# Patient Record
Sex: FEMALE | Race: WHITE | NOT HISPANIC OR LATINO | Employment: OTHER | URBAN - METROPOLITAN AREA
[De-identification: names, ages, dates, MRNs, and addresses within clinical notes are randomized per-mention and may not be internally consistent; named-entity substitution may affect disease eponyms.]

---

## 2017-03-21 PROBLEM — Z01.818 PRE-OP TESTING: Status: ACTIVE | Noted: 2017-03-21

## 2017-04-03 ENCOUNTER — OFFICE VISIT (OUTPATIENT)
Dept: OTOLARYNGOLOGY | Facility: CLINIC | Age: 67
End: 2017-04-03
Payer: MEDICARE

## 2017-04-03 ENCOUNTER — CLINICAL SUPPORT (OUTPATIENT)
Dept: AUDIOLOGY | Facility: CLINIC | Age: 67
End: 2017-04-03
Payer: MEDICARE

## 2017-04-03 VITALS — HEIGHT: 64 IN | WEIGHT: 185.88 LBS | BODY MASS INDEX: 31.73 KG/M2

## 2017-04-03 DIAGNOSIS — H80.92 OTOSCLEROSIS, LEFT: Primary | ICD-10-CM

## 2017-04-03 PROCEDURE — 92557 COMPREHENSIVE HEARING TEST: CPT | Mod: S$GLB,,, | Performed by: AUDIOLOGIST

## 2017-04-03 PROCEDURE — 99203 OFFICE O/P NEW LOW 30 MIN: CPT | Mod: S$GLB,,, | Performed by: OTOLARYNGOLOGY

## 2017-04-03 PROCEDURE — 99999 PR PBB SHADOW E&M-EST. PATIENT-LVL III: CPT | Mod: PBBFAC,,, | Performed by: OTOLARYNGOLOGY

## 2017-04-03 PROCEDURE — 92550 TYMPANOMETRY & REFLEX THRESH: CPT | Mod: S$GLB,,, | Performed by: AUDIOLOGIST

## 2017-04-03 PROCEDURE — 1160F RVW MEDS BY RX/DR IN RCRD: CPT | Mod: S$GLB,,, | Performed by: OTOLARYNGOLOGY
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
## 2017-04-04 ENCOUNTER — TELEPHONE (OUTPATIENT)
Dept: OTOLARYNGOLOGY | Facility: CLINIC | Age: 67
End: 2017-04-04

## 2017-04-04 DIAGNOSIS — H80.92 OTOSCLEROSIS OF LEFT EAR: Primary | ICD-10-CM

## 2017-04-19 PROBLEM — R09.82 POST-NASAL DRAINAGE: Status: ACTIVE | Noted: 2017-04-19

## 2017-05-01 PROBLEM — M70.62 GREATER TROCHANTERIC BURSITIS OF LEFT HIP: Status: ACTIVE | Noted: 2017-05-01

## 2017-05-01 PROBLEM — M65.311 TRIGGER FINGER OF RIGHT THUMB: Status: ACTIVE | Noted: 2017-05-01

## 2017-05-16 ENCOUNTER — ANESTHESIA (OUTPATIENT)
Dept: SURGERY | Facility: HOSPITAL | Age: 67
End: 2017-05-16
Payer: MEDICARE

## 2017-05-16 ENCOUNTER — HOSPITAL ENCOUNTER (OUTPATIENT)
Facility: HOSPITAL | Age: 67
Discharge: HOME | End: 2017-05-16
Attending: OTOLARYNGOLOGY | Admitting: OTOLARYNGOLOGY
Payer: MEDICARE

## 2017-05-16 ENCOUNTER — ANESTHESIA EVENT (OUTPATIENT)
Dept: SURGERY | Facility: HOSPITAL | Age: 67
End: 2017-05-16
Payer: MEDICARE

## 2017-05-16 ENCOUNTER — SURGERY (OUTPATIENT)
Age: 67
End: 2017-05-16

## 2017-05-16 DIAGNOSIS — H80.90 OTOSCLEROSIS: ICD-10-CM

## 2017-05-16 DIAGNOSIS — H80.92 OTOSCLEROSIS, LEFT: Primary | ICD-10-CM

## 2017-05-16 PROCEDURE — 36000708 HC OR TIME LEV III 1ST 15 MIN: Performed by: OTOLARYNGOLOGY

## 2017-05-16 PROCEDURE — 63600175 PHARM REV CODE 636 W HCPCS: Performed by: STUDENT IN AN ORGANIZED HEALTH CARE EDUCATION/TRAINING PROGRAM

## 2017-05-16 PROCEDURE — 27201423 OPTIME MED/SURG SUP & DEVICES STERILE SUPPLY: Performed by: OTOLARYNGOLOGY

## 2017-05-16 PROCEDURE — 37000008 HC ANESTHESIA 1ST 15 MINUTES: Performed by: OTOLARYNGOLOGY

## 2017-05-16 PROCEDURE — 71000016 HC POSTOP RECOV ADDL HR: Performed by: OTOLARYNGOLOGY

## 2017-05-16 PROCEDURE — 36000709 HC OR TIME LEV III EA ADD 15 MIN: Performed by: OTOLARYNGOLOGY

## 2017-05-16 PROCEDURE — 27000221 HC OXYGEN, UP TO 24 HOURS

## 2017-05-16 PROCEDURE — D9220A PRA ANESTHESIA: Mod: ANES,,, | Performed by: ANESTHESIOLOGY

## 2017-05-16 PROCEDURE — 25000003 PHARM REV CODE 250: Performed by: OTOLARYNGOLOGY

## 2017-05-16 PROCEDURE — 71000033 HC RECOVERY, INTIAL HOUR: Performed by: OTOLARYNGOLOGY

## 2017-05-16 PROCEDURE — 63600175 PHARM REV CODE 636 W HCPCS: Performed by: NURSE ANESTHETIST, CERTIFIED REGISTERED

## 2017-05-16 PROCEDURE — 69660 REVISE MIDDLE EAR BONE: CPT | Mod: LT,,, | Performed by: OTOLARYNGOLOGY

## 2017-05-16 PROCEDURE — 25000003 PHARM REV CODE 250: Performed by: NURSE ANESTHETIST, CERTIFIED REGISTERED

## 2017-05-16 PROCEDURE — 63600175 PHARM REV CODE 636 W HCPCS

## 2017-05-16 PROCEDURE — 27100025 HC TUBING, SET FLUID WARMER: Performed by: NURSE ANESTHETIST, CERTIFIED REGISTERED

## 2017-05-16 PROCEDURE — 71000039 HC RECOVERY, EACH ADD'L HOUR: Performed by: OTOLARYNGOLOGY

## 2017-05-16 PROCEDURE — L8613 OSSICULAR IMPLANT: HCPCS | Performed by: OTOLARYNGOLOGY

## 2017-05-16 PROCEDURE — 94760 N-INVAS EAR/PLS OXIMETRY 1: CPT

## 2017-05-16 PROCEDURE — 71000015 HC POSTOP RECOV 1ST HR: Performed by: OTOLARYNGOLOGY

## 2017-05-16 PROCEDURE — 25000003 PHARM REV CODE 250: Performed by: STUDENT IN AN ORGANIZED HEALTH CARE EDUCATION/TRAINING PROGRAM

## 2017-05-16 PROCEDURE — D9220A PRA ANESTHESIA: Mod: CRNA,,, | Performed by: NURSE ANESTHETIST, CERTIFIED REGISTERED

## 2017-05-16 PROCEDURE — 37000009 HC ANESTHESIA EA ADD 15 MINS: Performed by: OTOLARYNGOLOGY

## 2017-05-16 DEVICE — PISTON WIRE 4.25MMX.6MM S S: Type: IMPLANTABLE DEVICE | Site: EAR | Status: FUNCTIONAL

## 2017-05-16 RX ORDER — NEOMYCIN SULFATE, POLYMYXIN B SULFATE AND HYDROCORTISONE 10; 3.5; 1 MG/ML; MG/ML; [USP'U]/ML
SUSPENSION/ DROPS AURICULAR (OTIC)
Status: DISCONTINUED | OUTPATIENT
Start: 2017-05-16 | End: 2017-05-16 | Stop reason: HOSPADM

## 2017-05-16 RX ORDER — SODIUM CHLORIDE 9 MG/ML
INJECTION, SOLUTION INTRAVENOUS CONTINUOUS
Status: DISCONTINUED | OUTPATIENT
Start: 2017-05-16 | End: 2017-05-16 | Stop reason: HOSPADM

## 2017-05-16 RX ORDER — MECLIZINE HYDROCHLORIDE 25 MG/1
25 TABLET ORAL 3 TIMES DAILY PRN
Qty: 30 TABLET | Refills: 1 | Status: SHIPPED | OUTPATIENT
Start: 2017-05-16 | End: 2017-06-07

## 2017-05-16 RX ORDER — PHENYLEPHRINE HYDROCHLORIDE 10 MG/ML
INJECTION INTRAVENOUS
Status: DISCONTINUED | OUTPATIENT
Start: 2017-05-16 | End: 2017-05-16

## 2017-05-16 RX ORDER — FENTANYL CITRATE 50 UG/ML
INJECTION, SOLUTION INTRAMUSCULAR; INTRAVENOUS
Status: DISCONTINUED | OUTPATIENT
Start: 2017-05-16 | End: 2017-05-16

## 2017-05-16 RX ORDER — MECLIZINE HYDROCHLORIDE 25 MG/1
25 TABLET ORAL 3 TIMES DAILY PRN
Status: DISCONTINUED | OUTPATIENT
Start: 2017-05-16 | End: 2017-05-16 | Stop reason: HOSPADM

## 2017-05-16 RX ORDER — LIDOCAINE HCL/PF 100 MG/5ML
SYRINGE (ML) INTRAVENOUS
Status: DISCONTINUED | OUTPATIENT
Start: 2017-05-16 | End: 2017-05-16

## 2017-05-16 RX ORDER — SUCCINYLCHOLINE CHLORIDE 20 MG/ML
INJECTION INTRAMUSCULAR; INTRAVENOUS
Status: DISCONTINUED | OUTPATIENT
Start: 2017-05-16 | End: 2017-05-16

## 2017-05-16 RX ORDER — OXYCODONE AND ACETAMINOPHEN 5; 325 MG/1; MG/1
1 TABLET ORAL EVERY 6 HOURS PRN
Qty: 30 TABLET | Refills: 0 | Status: SHIPPED | OUTPATIENT
Start: 2017-05-16 | End: 2017-05-26

## 2017-05-16 RX ORDER — DEXAMETHASONE SODIUM PHOSPHATE 4 MG/ML
INJECTION, SOLUTION INTRA-ARTICULAR; INTRALESIONAL; INTRAMUSCULAR; INTRAVENOUS; SOFT TISSUE
Status: DISCONTINUED | OUTPATIENT
Start: 2017-05-16 | End: 2017-05-16

## 2017-05-16 RX ORDER — ACETAMINOPHEN 10 MG/ML
INJECTION, SOLUTION INTRAVENOUS
Status: DISCONTINUED | OUTPATIENT
Start: 2017-05-16 | End: 2017-05-16

## 2017-05-16 RX ORDER — DIAZEPAM 10 MG/2ML
INJECTION INTRAMUSCULAR
Status: COMPLETED
Start: 2017-05-16 | End: 2017-05-16

## 2017-05-16 RX ORDER — CEFAZOLIN SODIUM 1 G/3ML
INJECTION, POWDER, FOR SOLUTION INTRAMUSCULAR; INTRAVENOUS
Status: DISCONTINUED | OUTPATIENT
Start: 2017-05-16 | End: 2017-05-16

## 2017-05-16 RX ORDER — LIDOCAINE HYDROCHLORIDE 10 MG/ML
1 INJECTION, SOLUTION EPIDURAL; INFILTRATION; INTRACAUDAL; PERINEURAL ONCE
Status: COMPLETED | OUTPATIENT
Start: 2017-05-16 | End: 2017-05-16

## 2017-05-16 RX ORDER — ONDANSETRON 2 MG/ML
INJECTION INTRAMUSCULAR; INTRAVENOUS
Status: DISCONTINUED | OUTPATIENT
Start: 2017-05-16 | End: 2017-05-16

## 2017-05-16 RX ORDER — DIAZEPAM 2 MG/1
2 TABLET ORAL EVERY 6 HOURS PRN
Qty: 20 TABLET | Refills: 1 | Status: SHIPPED | OUTPATIENT
Start: 2017-05-16 | End: 2017-07-03

## 2017-05-16 RX ORDER — DEXMEDETOMIDINE HYDROCHLORIDE 100 UG/ML
INJECTION, SOLUTION INTRAVENOUS
Status: DISCONTINUED | OUTPATIENT
Start: 2017-05-16 | End: 2017-05-16

## 2017-05-16 RX ORDER — OXYCODONE AND ACETAMINOPHEN 5; 325 MG/1; MG/1
1 TABLET ORAL EVERY 4 HOURS PRN
Status: DISCONTINUED | OUTPATIENT
Start: 2017-05-16 | End: 2017-05-16 | Stop reason: HOSPADM

## 2017-05-16 RX ORDER — GLYCOPYRROLATE 0.2 MG/ML
INJECTION INTRAMUSCULAR; INTRAVENOUS
Status: DISCONTINUED | OUTPATIENT
Start: 2017-05-16 | End: 2017-05-16

## 2017-05-16 RX ORDER — MIDAZOLAM HYDROCHLORIDE 1 MG/ML
INJECTION, SOLUTION INTRAMUSCULAR; INTRAVENOUS
Status: DISCONTINUED | OUTPATIENT
Start: 2017-05-16 | End: 2017-05-16

## 2017-05-16 RX ORDER — PROPOFOL 10 MG/ML
VIAL (ML) INTRAVENOUS
Status: DISCONTINUED | OUTPATIENT
Start: 2017-05-16 | End: 2017-05-16

## 2017-05-16 RX ORDER — ROCURONIUM BROMIDE 10 MG/ML
INJECTION, SOLUTION INTRAVENOUS
Status: DISCONTINUED | OUTPATIENT
Start: 2017-05-16 | End: 2017-05-16

## 2017-05-16 RX ORDER — LIDOCAINE HYDROCHLORIDE AND EPINEPHRINE 10; 10 MG/ML; UG/ML
INJECTION, SOLUTION INFILTRATION; PERINEURAL
Status: DISCONTINUED | OUTPATIENT
Start: 2017-05-16 | End: 2017-05-16 | Stop reason: HOSPADM

## 2017-05-16 RX ORDER — LIDOCAINE HYDROCHLORIDE 10 MG/ML
1 INJECTION, SOLUTION EPIDURAL; INFILTRATION; INTRACAUDAL; PERINEURAL ONCE
Status: DISCONTINUED | OUTPATIENT
Start: 2017-05-16 | End: 2017-05-16 | Stop reason: HOSPADM

## 2017-05-16 RX ORDER — DOXYCYCLINE 100 MG/1
100 CAPSULE ORAL 2 TIMES DAILY
Qty: 20 CAPSULE | Refills: 0 | Status: SHIPPED | OUTPATIENT
Start: 2017-05-16 | End: 2017-05-26

## 2017-05-16 RX ORDER — DIAZEPAM 10 MG/2ML
2 INJECTION INTRAMUSCULAR ONCE
Status: COMPLETED | OUTPATIENT
Start: 2017-05-16 | End: 2017-05-16

## 2017-05-16 RX ORDER — ONDANSETRON 2 MG/ML
4 INJECTION INTRAMUSCULAR; INTRAVENOUS ONCE AS NEEDED
Status: COMPLETED | OUTPATIENT
Start: 2017-05-16 | End: 2017-05-16

## 2017-05-16 RX ORDER — FAMOTIDINE 10 MG/ML
INJECTION INTRAVENOUS
Status: DISCONTINUED | OUTPATIENT
Start: 2017-05-16 | End: 2017-05-16

## 2017-05-16 RX ADMIN — PHENYLEPHRINE HYDROCHLORIDE 100 MCG: 10 INJECTION INTRAVENOUS at 07:05

## 2017-05-16 RX ADMIN — FENTANYL CITRATE 100 MCG: 50 INJECTION, SOLUTION INTRAMUSCULAR; INTRAVENOUS at 07:05

## 2017-05-16 RX ADMIN — PHENYLEPHRINE HYDROCHLORIDE 150 MCG: 10 INJECTION INTRAVENOUS at 08:05

## 2017-05-16 RX ADMIN — LIDOCAINE HYDROCHLORIDE 50 MG: 20 INJECTION, SOLUTION INTRAVENOUS at 08:05

## 2017-05-16 RX ADMIN — OXYCODONE HYDROCHLORIDE AND ACETAMINOPHEN 1 TABLET: 5; 325 TABLET ORAL at 09:05

## 2017-05-16 RX ADMIN — LIDOCAINE HYDROCHLORIDE 25 MG: 20 INJECTION, SOLUTION INTRAVENOUS at 08:05

## 2017-05-16 RX ADMIN — GELATIN ABSORBABLE SPONGE 12-7 MM 1 EACH: 12-7 MISC at 08:05

## 2017-05-16 RX ADMIN — FAMOTIDINE 20 MG: 10 INJECTION, SOLUTION INTRAVENOUS at 08:05

## 2017-05-16 RX ADMIN — ONDANSETRON 4 MG: 2 INJECTION INTRAMUSCULAR; INTRAVENOUS at 08:05

## 2017-05-16 RX ADMIN — DEXAMETHASONE SODIUM PHOSPHATE 8 MG: 4 INJECTION, SOLUTION INTRAMUSCULAR; INTRAVENOUS at 08:05

## 2017-05-16 RX ADMIN — PROPOFOL 200 MG: 10 INJECTION, EMULSION INTRAVENOUS at 07:05

## 2017-05-16 RX ADMIN — SODIUM CHLORIDE, SODIUM GLUCONATE, SODIUM ACETATE, POTASSIUM CHLORIDE, MAGNESIUM CHLORIDE, SODIUM PHOSPHATE, DIBASIC, AND POTASSIUM PHOSPHATE: .53; .5; .37; .037; .03; .012; .00082 INJECTION, SOLUTION INTRAVENOUS at 08:05

## 2017-05-16 RX ADMIN — SODIUM CHLORIDE: 0.9 INJECTION, SOLUTION INTRAVENOUS at 07:05

## 2017-05-16 RX ADMIN — ACETAMINOPHEN 1000 MG: 10 INJECTION, SOLUTION INTRAVENOUS at 08:05

## 2017-05-16 RX ADMIN — PHENYLEPHRINE HYDROCHLORIDE 100 MCG: 10 INJECTION INTRAVENOUS at 08:05

## 2017-05-16 RX ADMIN — NEOMYCIN SULFATE, POLYMYXIN B SULFATE AND HYDROCORTISONE 10 DROP: 10; 3.5; 1 SUSPENSION/ DROPS AURICULAR (OTIC) at 08:05

## 2017-05-16 RX ADMIN — PHENYLEPHRINE HYDROCHLORIDE 200 MCG: 10 INJECTION INTRAVENOUS at 08:05

## 2017-05-16 RX ADMIN — ROCURONIUM BROMIDE 5 MG: 10 INJECTION, SOLUTION INTRAVENOUS at 07:05

## 2017-05-16 RX ADMIN — GLYCOPYRROLATE 0.1 MG: 0.2 INJECTION, SOLUTION INTRAMUSCULAR; INTRAVENOUS at 07:05

## 2017-05-16 RX ADMIN — DIAZEPAM 2 MG: 5 INJECTION, SOLUTION INTRAMUSCULAR; INTRAVENOUS at 01:05

## 2017-05-16 RX ADMIN — SUCCINYLCHOLINE CHLORIDE 140 MG: 20 INJECTION, SOLUTION INTRAMUSCULAR; INTRAVENOUS at 07:05

## 2017-05-16 RX ADMIN — MECLIZINE HYDROCHLORIDE 25 MG: 25 TABLET ORAL at 09:05

## 2017-05-16 RX ADMIN — DIAZEPAM 2 MG: 10 INJECTION INTRAMUSCULAR at 01:05

## 2017-05-16 RX ADMIN — LIDOCAINE HYDROCHLORIDE AND EPINEPHRINE 10 ML: 10; 10 INJECTION, SOLUTION INFILTRATION; PERINEURAL at 08:05

## 2017-05-16 RX ADMIN — LIDOCAINE HYDROCHLORIDE 0.2 MG: 10 INJECTION, SOLUTION EPIDURAL; INFILTRATION; INTRACAUDAL; PERINEURAL at 07:05

## 2017-05-16 RX ADMIN — MIDAZOLAM HYDROCHLORIDE 2 MG: 1 INJECTION, SOLUTION INTRAMUSCULAR; INTRAVENOUS at 07:05

## 2017-05-16 RX ADMIN — CEFAZOLIN 2 G: 1 INJECTION, POWDER, FOR SOLUTION INTRAVENOUS at 07:05

## 2017-05-16 RX ADMIN — ONDANSETRON 4 MG: 2 INJECTION INTRAMUSCULAR; INTRAVENOUS at 12:05

## 2017-05-16 RX ADMIN — GLYCOPYRROLATE 0.2 MG: 0.2 INJECTION, SOLUTION INTRAMUSCULAR; INTRAVENOUS at 08:05

## 2017-05-16 RX ADMIN — DEXMEDETOMIDINE HYDROCHLORIDE 18 MCG: 100 INJECTION, SOLUTION, CONCENTRATE INTRAVENOUS at 08:05

## 2017-05-16 RX ADMIN — LIDOCAINE HYDROCHLORIDE 75 MG: 20 INJECTION, SOLUTION INTRAVENOUS at 07:05

## 2017-05-16 NOTE — PLAN OF CARE
Patient up OOB ambulated to restroom; upon return from restroom patient c/o of nausea and vomited 200mls of liquid. Attempting to reach anesthesia for phenergan order.

## 2017-05-16 NOTE — PLAN OF CARE
Spoke with Dr Alamo; he recommends no medicatiojn for n/v since it is related to vertigo. Will reassess the patients s/s at [id number]

## 2017-05-16 NOTE — TRANSFER OF CARE
"Anesthesia Transfer of Care Note    Patient: Abbey Vargas    Procedure(s) Performed: Procedure(s) (LRB):  STAPEDECTOMY (Left)    Transfer of care protocol was followed      Last vitals:   Visit Vitals    /70 (BP Location: Left arm, Patient Position: Sitting, BP Method: Automatic)    Pulse 77    Temp 37.1 °C (98.7 °F) (Tympanic)    Resp 18    Ht 5' 4" (1.626 m)    Wt 83.9 kg (185 lb)    SpO2 97%    Breastfeeding No    BMI 31.76 kg/m2     "

## 2017-05-16 NOTE — ANESTHESIA POSTPROCEDURE EVALUATION
"Anesthesia Post Evaluation    Patient: Abbey Vargas    Procedure(s) Performed: Procedure(s) (LRB):  STAPEDECTOMY (Left)    Final Anesthesia Type: general  Patient location during evaluation: PACU  Patient participation: Yes- Able to Participate  Level of consciousness: awake and alert and oriented  Post-procedure vital signs: reviewed and stable  Pain management: adequate  Airway patency: patent  PONV status at discharge: No PONV  Anesthetic complications: no      Cardiovascular status: blood pressure returned to baseline and hemodynamically stable  Respiratory status: spontaneous ventilation, unassisted and room air  Hydration status: euvolemic  Follow-up not needed.        Visit Vitals    BP (!) 103/59    Pulse 73    Temp 36.6 °C (97.9 °F) (Oral)    Resp 18    Ht 5' 4" (1.626 m)    Wt 83.9 kg (185 lb)    SpO2 95%    Breastfeeding No    BMI 31.76 kg/m2       Pain/Lauryn Score: Pain Assessment Performed: Yes (5/16/2017  9:55 AM)  Presence of Pain: denies (5/16/2017  9:55 AM)  Pain Rating Prior to Med Admin: 0 (5/16/2017  9:32 AM)  Pain Rating Post Med Admin: 0 (5/16/2017  9:55 AM)  Lauryn Score: 10 (5/16/2017  9:55 AM)      "

## 2017-05-16 NOTE — OP NOTE
Pre Op Diagnosis: Otosclerosis/ Left    Post Op Diagnosis: Same    Operative Procedure: Small fenestra laser stapedotomy with use of operating microscope/ Left                                       Surgeon: Markus Castro M.D.    Assist:Pepe    5/16/17    Anesthesia: General Endotracheal    Operative Procedure in Detail:    The patient was brought to the operating room and placed in the supine position. After general endotracheal anesthesia was induced the ear was prepped and draped in the usual fashion for transcanal surgery. The operating microscope was brought onto the field and used for the remaining of the case. The ear canal was infiltrated with 1% lidocaine with 1/100,000 epinephrine. A tympanomeatal flap 8 mm long was incised and elevated to the annulus and the tympanic membrane was turned forward on the malleus. The middle ear was inspected. The patient had a mobile malleus and incus but a stapes fixed by otosclerosis. Curettage of the posterior, superior bony margin was done. The chorda tympani was mobilized and protected. The distance between the latera surface of the incus was measured and found to be 4.5 mm. The laser was brought onto the field. With a setting of 1.5 tripp the stapedius tendon and posterior lydia were vaporized. The rozina was picked away. The incudostapedial joint was sectioned and the anterior lydia down fractured and removed from the operative field. The laser was directed to the posterior center of the footplate and a .7 mm sharda was created. This was finished off with a .7 mm sizer. A 4.25 by .6 mm hope-stainless steel prosthesis was placed into the center of the fenestra and crimped securely onto the incus. Small pieces of blood soaked gelfoam were packed around the prosthesis in the oval window niche to affect a seal. The tympanomeatal flap was returned to its normal position a packed into placed with cortisporin soaked gelfoam. Sterile cotton was applied to the meatus and  the procedure was terminated. The patient tolerated the procedure well. [place] for hearing improvement is good.

## 2017-05-16 NOTE — BRIEF OP NOTE
[facility]  Brief Operative Note     SUMMARY     Surgery Date: 5/16/2017     Surgeon(s) and Role:     * Zander Cantu MD - Resident - Assisting     * Markus Castro MD - Primary        Pre-op Diagnosis:  Otosclerosis of left ear [H80.92]    Post-op Diagnosis:  Post-Op Diagnosis Codes:     * Otosclerosis of left ear [H80.92]    Procedure(s) (LRB):  STAPEDECTOMY (Left)    Anesthesia: General    Description of the findings of the procedure: left otosclerosis    Findings/Key Components: see op report    Estimated Blood Loss: 1.7 ml         Specimens:   Specimen     None          Discharge Note    SUMMARY     Admit Date: 5/16/2017    Discharge Date and Time:  05/16/2017 8:47 AM    Hospital Course (synopsis of major diagnoses, care, treatment, and services provided during the course of the hospital stay): Pt has outpatient surgery and was awakened in the recovery room and discharged home in a stable condition. pts pain was controlled and tolerating a diet         Final Diagnosis: Post-Op Diagnosis Codes:     * Otosclerosis of left ear [H80.92]    Disposition: Home or Self Care    Follow Up/Patient Instructions:     Medications:  Reconciled Home Medications:   Current Discharge Medication List      START taking these medications    Details   doxycycline (VIBRAMYCIN) 100 MG Cap Take 1 capsule (100 mg total) by mouth 2 (two) times daily.  Qty: 20 capsule, Refills: 0      meclizine (ANTIVERT) 25 mg tablet Take 1 tablet (25 mg total) by mouth 3 (three) times daily as needed.  Qty: 30 tablet, Refills: 1      oxycodone-acetaminophen (PERCOCET) 5-325 mg per tablet Take 1 tablet by mouth every 6 (six) hours as needed for Pain.  Qty: 30 tablet, Refills: 0         CONTINUE these medications which have NOT CHANGED    Details   amitriptyline (ELAVIL) 50 MG tablet Take 1 tablet (50 mg total) by mouth every evening.  Qty: 90 tablet, Refills: 3    Associated Diagnoses: Carpal tunnel syndrome, unspecified  laterality; Bilateral low back pain without sciatica, unspecified chronicity      aspirin (ECOTRIN) 81 MG EC tablet Take 81 mg by mouth once daily.      BIOTIN ORAL Take by mouth.      calcium-vitamin D3 (CALCIUM 500 + D) 500 mg(1,250mg) -200 unit per tablet Take 1 tablet by mouth 2 (two) times daily with meals.      cyclobenzaprine (FLEXERIL) 5 MG tablet Take 5 mg by mouth 3 (three) times daily as needed.  Refills: 1      fish oil-omega-3 fatty acids 300-1,000 mg capsule Take 2 g by mouth once daily.      levothyroxine (SYNTHROID) 100 MCG tablet TAKE 1 TABLET BY MOUTH EVERY DAY  Qty: 90 tablet, Refills: 3      lisinopril 10 MG tablet TAKE 1 TABLET EVERY DAY  Qty: 90 tablet, Refills: 2    Associated Diagnoses: Essential hypertension      meloxicam (MOBIC) 7.5 MG tablet Take 1 tablet (7.5 mg total) by mouth once daily.  Qty: 90 tablet, Refills: 1    Comments: PATIENT REQUESTS A 90 DAY RX      multivitamin (THERAGRAN) per tablet Take 1 tablet by mouth once daily.      pravastatin (PRAVACHOL) 80 MG tablet Take 1 tablet (80 mg total) by mouth once daily.  Qty: 90 tablet, Refills: 3    Associated Diagnoses: Hyperlipidemia, unspecified hyperlipidemia type      ketorolac 0.5% (ACULAR) 0.5 % Drop Place 1 drop into the left eye 4 (four) times daily.      loratadine (CLARITIN) 10 mg tablet Take 1 tablet (10 mg total) by mouth once daily.  Qty: 7 tablet, Refills: 0    Associated Diagnoses: Sputum production      prednisoLONE acetate (PRED FORTE) 1 % DrpS Place 1 drop into the left eye once daily.              Discharge Procedure Orders  Diet general     Other restrictions (specify):   Order Comments: Keep ear clean and dry   Dry ear precautions    DO NOT USE CPAP FOR 5 DAYS      RTC 3 weekd  Do not strain, cough and sneeze with mouth open, do not bend over or lift over 10 pounds    Call md with any ear drainage or swelling, severe pain, facial weakness or other concerning symptoms     Call MD for:  increased confusion or  weakness     Call MD for:  persistent dizziness, light-headedness, or visual disturbances     Call MD for:  worsening rash     Call MD for:  severe persistent headache     Call MD for:  difficulty breathing or increased cough     Call MD for:  redness, tenderness, or signs of infection (pain, swelling, redness, odor or green/yellow discharge around incision site)     Call MD for:  severe uncontrolled pain     Call MD for:  persistent nausea and vomiting or diarrhea     Call MD for:  temperature >100.4     Remove dressing in 24 hours       Follow-up Information     Follow up with Markus Castro MD In 3 weeks.    Specialty:  Otolaryngology    Why:  For wound re-check    Contact information:    [address]  [facility] [address]  [phone]

## 2017-05-16 NOTE — ANESTHESIA RELEASE NOTE
"Anesthesia Release from PACU Note    Patient: Abbey Vargas    Procedure(s) Performed: Procedure(s) (LRB):  STAPEDECTOMY (Left)    Anesthesia type: general    Post pain: Adequate analgesia    Post assessment: no apparent anesthetic complications, tolerated procedure well and no evidence of recall    Last Vitals:   Visit Vitals    BP (!) 103/59    Pulse 73    Temp 36.7 °C (98 °F) (Oral)    Resp 18    Ht 5' 4" (1.626 m)    Wt 83.9 kg (185 lb)    SpO2 95%    Breastfeeding No    BMI 31.76 kg/m2       Post vital signs: stable    Level of consciousness: awake, alert  and oriented    Nausea/Vomiting: no nausea/no vomiting    Complications: none    Airway Patency: patent    Respiratory: unassisted, spontaneous ventilation, room air    Cardiovascular: stable and blood pressure at baseline    Hydration: euvolemic  "

## 2017-05-16 NOTE — ANESTHESIA PREPROCEDURE EVALUATION
05/16/2017  Abbey Vargas is a 66 y.o., female.    Anesthesia Evaluation    I have reviewed the Patient Summary Reports.    I have reviewed the Nursing Notes.   I have reviewed the Medications.     Review of Systems  Anesthesia Hx:  No problems with previous Anesthesia  History of prior surgery of interest to airway management or planning: Previous anesthesia: General Denies Family Hx of Anesthesia complications.   Denies Personal Hx of Anesthesia complications.   Social:  Non-Smoker, Social Alcohol Use    Cardiovascular:   Exercise tolerance: good Hypertension, well controlled hyperlipidemia NYHA Classification III ECG has been reviewed. Normal sinus rhythm  Within normal limits  No previous ECGs available  Confirmed by Wilver Costa MD ([id number]) on 3/7/2017 8:13:44 AM    Referred By: ADITI CARTER           Confirmed By:Wilver Costa MD   Pulmonary:   Sleep Apnea, CPAP    Musculoskeletal:   Arthritis   Spine Disorders: lumbar Disc disease and Degenerative disease    Endocrine:   Hypothyroidism        Physical Exam  General:  Well nourished, Obesity    Airway/Jaw/Neck:  Airway Findings: Mouth Opening: Normal Tongue: Normal  General Airway Assessment: Adult  Mallampati: III  TM Distance: Normal, at least 6 cm         Dental:  Dental Findings:         Mental Status:  Mental Status Findings:  Cooperative         Anesthesia Plan  Type of Anesthesia, risks & benefits discussed:  Anesthesia Type:  general  Patient's Preference:   Intra-op Monitoring Plan: standard ASA monitors  Intra-op Monitoring Plan Comments:   Post Op Pain Control Plan:   Post Op Pain Control Plan Comments:   Induction:   IV  Beta Blocker:  Patient is not currently on a Beta-Blocker (No further documentation required).       Informed Consent: Patient understands risks and agrees with Anesthesia plan.  Questions answered. Anesthesia  consent signed with patient.  ASA Score: 3     Day of Surgery Review of History & Physical: I have interviewed and examined the patient. I have reviewed the patient's H&P dated:  There are no significant changes.      Anesthesia Plan Notes: Labs,EKG AND CHEST X RAY REVIEWED.        Ready For Surgery From Anesthesia Perspective.

## 2017-05-16 NOTE — PLAN OF CARE
Dr Petersen at the desk. Notified him of patients nausea and vomiting with any type of movement.  Verbalizes that he will order IV Diazepam  And will also write a prescription for po diazepam for the patient to go home with.

## 2017-05-16 NOTE — PLAN OF CARE
Assisted patient to sit up at beside to start dressing for discharge. Pt immediately complains of nausea and dizziness. Patient vomited 250ml liquid. Will give zofran as ordered.

## 2017-05-16 NOTE — IP AVS SNAPSHOT
[facility]  [address]  [facility] [address]  Phone: [phone]           Patient Discharge Instructions   Our goal is to set you up for success. This packet includes information on your condition, medications, and your home care.  It will help you care for yourself to prevent having to return to the hospital.     Please ask your nurse if you have any questions.      There are many details to remember when preparing to leave the hospital. Here is what you will need to do:    1. Take your medicine. If you are prescribed medications, review your Medication List on the following pages. You may have new medications to  at the pharmacy and others that you'll need to stop taking. Review the instructions for how and when to take your medications. Talk with your doctor or nurses if you are unsure of what to do.     2. Go to your follow-up appointments. Specific follow-up information is listed in the following pages. Your may be contacted by a nurse or clinical provider about future appointments. Be sure we have all of the phone numbers to reach you. Please contact your provider's office if you are unable to make an appointment.     3. Watch for warning signs. Your doctor or nurse will give you detailed warning signs to watch for and when to call for assistance. These instructions may also include educational information about your condition. If you experience any of warning signs to your health, call your doctor.           [facility]  Unless otherwise directed by your provider, please   contact [facility], our nurse care line   that is available for 24/7 assistance.     [phone] (toll-free)     Registered nurses in the [facility]   provide: appointment scheduling, clinical advisement, health education, and other advisory services.                  ** Verify the list of medication(s) below is accurate and up to date. Carry this with you in case of  emergency. If your medications have changed, please notify your healthcare provider.             Medication List      START taking these medications        Additional Info                      doxycycline 100 MG Cap   Commonly known as:  VIBRAMYCIN   Quantity:  20 capsule   Refills:  0   Dose:  100 mg    Instructions:  Take 1 capsule (100 mg total) by mouth 2 (two) times daily.     Begin Date    AM    Noon    PM    Bedtime       meclizine 25 mg tablet   Commonly known as:  ANTIVERT   Quantity:  30 tablet   Refills:  1   Dose:  25 mg    Last time this was given:  25 mg on 5/16/2017  9:32 AM   Instructions:  Take 1 tablet (25 mg total) by mouth 3 (three) times daily as needed.     Begin Date    AM    Noon    PM    Bedtime       oxycodone-acetaminophen 5-325 mg per tablet   Commonly known as:  PERCOCET   Quantity:  30 tablet   Refills:  0   Dose:  1 tablet    Last time this was given:  1 tablet on 5/16/2017  9:32 AM   Instructions:  Take 1 tablet by mouth every 6 (six) hours as needed for Pain.     Begin Date    AM    Noon    PM    Bedtime         CONTINUE taking these medications        Additional Info                      amitriptyline 50 MG tablet   Commonly known as:  ELAVIL   Quantity:  90 tablet   Refills:  3   Dose:  50 mg    Instructions:  Take 1 tablet (50 mg total) by mouth every evening.     Begin Date    AM    Noon    PM    Bedtime       aspirin 81 MG EC tablet   Commonly known as:  ECOTRIN   Refills:  0   Dose:  81 mg    Instructions:  Take 81 mg by mouth once daily.     Begin Date    AM    Noon    PM    Bedtime       BIOTIN ORAL   Refills:  0    Instructions:  Take by mouth.     Begin Date    AM    Noon    PM    Bedtime       CALCIUM 500 + D 500 mg(1,250mg) -200 unit per tablet   Refills:  0   Dose:  1 tablet   Generic drug:  calcium-vitamin D3    Instructions:  Take 1 tablet by mouth 2 (two) times daily with meals.     Begin Date    AM    Noon    PM    Bedtime       cyclobenzaprine 5 MG tablet   Commonly  known as:  FLEXERIL   Refills:  1   Dose:  5 mg    Instructions:  Take 5 mg by mouth 3 (three) times daily as needed.     Begin Date    AM    Noon    PM    Bedtime       fish oil-omega-3 fatty acids 300-1,000 mg capsule   Refills:  0   Dose:  2 g    Instructions:  Take 2 g by mouth once daily.     Begin Date    AM    Noon    PM    Bedtime       levothyroxine 100 MCG tablet   Commonly known as:  SYNTHROID   Quantity:  90 tablet   Refills:  3    Instructions:  TAKE 1 TABLET BY MOUTH EVERY DAY     Begin Date    AM    Noon    PM    Bedtime       lisinopril 10 MG tablet   Quantity:  90 tablet   Refills:  2    Instructions:  TAKE 1 TABLET EVERY DAY     Begin Date    AM    Noon    PM    Bedtime       loratadine 10 mg tablet   Commonly known as:  CLARITIN   Quantity:  7 tablet   Refills:  0   Dose:  10 mg    Instructions:  Take 1 tablet (10 mg total) by mouth once daily.     Begin Date    AM    Noon    PM    Bedtime       meloxicam 7.5 MG tablet   Commonly known as:  MOBIC   Quantity:  90 tablet   Refills:  1   Dose:  7.5 mg   Comments:  PATIENT REQUESTS A 90 DAY RX    Instructions:  Take 1 tablet (7.5 mg total) by mouth once daily.     Begin Date    AM    Noon    PM    Bedtime       multivitamin per tablet   Commonly known as:  THERAGRAN   Refills:  0   Dose:  1 tablet    Instructions:  Take 1 tablet by mouth once daily.     Begin Date    AM    Noon    PM    Bedtime       pravastatin 80 MG tablet   Commonly known as:  PRAVACHOL   Quantity:  90 tablet   Refills:  3   Dose:  80 mg    Instructions:  Take 1 tablet (80 mg total) by mouth once daily.     Begin Date    AM    Noon    PM    Bedtime         ASK your doctor about these medications        Additional Info                      ketorolac 0.5% 0.5 % Drop   Commonly known as:  ACULAR   Refills:  0   Dose:  1 drop    Instructions:  Place 1 drop into the left eye 4 (four) times daily.     Begin Date    AM    Noon    PM    Bedtime       prednisoLONE acetate 1 % Drps    Commonly known as:  PRED FORTE   Refills:  0   Dose:  1 drop    Instructions:  Place 1 drop into the left eye once daily.     Begin Date    AM    Noon    PM    Bedtime            Where to Get Your Medications      You can get these medications from any pharmacy     Bring a paper prescription for each of these medications     doxycycline 100 MG Cap    meclizine 25 mg tablet    oxycodone-acetaminophen 5-325 mg per tablet                  Please bring to all follow up appointments:    1. A copy of your discharge instructions.  2. All medicines you are currently taking in their original bottles.  3. Identification and insurance card.    Please arrive 15 minutes ahead of scheduled appointment time.    Please call 24 hours in advance if you must reschedule your appointment and/or time.        Your Scheduled Appointments     Jun 07, 2017  8:45 AM CDT   Post OP with Markus Castro MD   [facility] - Otorhinolaryngology ([facility] )    [address]  [place] [address]   [phone]            Jun 07, 2017  9:30 AM CDT   Audiogram with AUDIOGRAM, AUDIO   [facility] - Audiology ([facility] )    [address]  [place] [address]   [phone]            Jun 16, 2017  8:15 AM CDT   Established Patient Visit with BETTY KINGR RESIDENT   [facility] - Phys. Med & Rehab ([facility]    [address] [facility] [address]   [phone]            Jul 03, 2017  1:00 PM CDT   OCT with OPHTHALMOLOGY TESTING, MAITE King - Ophthalmology ([facility])    [address] [facility] [address]   [phone]            Jul 18, 2017 10:00 AM CDT   Molina Visual Beltran with OPHTHALMOLOGY TESTING, MAITE King - Ophthalmology ([facility])    [address] [facility] [address]   [phone]              Follow-up Information     Follow up with Markus Castro MD In 3 weeks.    Specialty:  Otolaryngology    Why:  For  wound re-check    Contact information:    [address]  [facility] [address]  [phone]          Discharge Instructions     Future Orders    Call MD for:  difficulty breathing or increased cough     Call MD for:  increased confusion or weakness     Call MD for:  persistent dizziness, light-headedness, or visual disturbances     Call MD for:  persistent nausea and vomiting or diarrhea     Call MD for:  redness, tenderness, or signs of infection (pain, swelling, redness, odor or green/yellow discharge around incision site)     Call MD for:  severe persistent headache     Call MD for:  severe uncontrolled pain     Call MD for:  temperature >100.4     Call MD for:  worsening rash     Diet general     Questions:    Total calories:      Fat restriction, if any:      Protein restriction, if any:      Na restriction, if any:      Fluid restriction:      Additional restrictions:      Other restrictions (specify):     Comments:    Keep ear clean and dry   Dry ear precautions    DO NOT USE CPAP FOR 5 DAYS      RTC 3 weekd  Do not strain, cough and sneeze with mouth open, do not bend over or lift over 10 pounds    Call md with any ear drainage or swelling, severe pain, facial weakness or other concerning symptoms        Discharge Instructions         Stapes Surgery  Stapes surgery can improve conductive hearing. This surgery is done to replace all or part of a damaged (fixated) stapes bone. You will be given general anesthesia or local anesthesia with sedation during surgery. The surgery takes about 1 to 2 hours.    A diseased stapes bone  The stapes bone may become affected by otosclerosis, an inherited middle ear disease. The disease creates spongy bone tissue. The tissue grows and hardens around the footplate (the part of the stapes that touches the inner ear). Hearing loss may result.    Removing bone  The first step of stapes surgery is removing the crura. This is the part of the stapes that touches the footplate.  Your surgeon reaches the crura by going through the ear canal. An incision is made around the eardrum. The eardrum is held to one side. Then the crura is removed.    Preparing bone  The second step is preparing the diseased footplate for bone replacement. This will let sound vibrations reach the inner ear again. Your surgeon may make a hole in the footplate with a laser or drill. This is called a stapedotomy. Or all of the footplate may be removed and replaced with tissue. This is called a stapedectomy.    Replacing bone  The third step is replacing the crura. A manmade part (called a prosthesis) is attached to the incus bone. The prosthesis transmits sound waves to the inner ear. There are many types of prostheses. They are most often made of metal, plastic, or your own tissue. But some may use more than one of these materials.  Surgical complications  As with any surgery, this surgery has risks. You may still have some hearing loss that remains after surgery. In rare cases, the surgery can make hearing loss worse. Talk with your healthcare provider about any complications that this surgery has.   Date Last Reviewed: 7/1/2016  © 8132-7757 [employer]. [address] [place] [address]. All rights reserved. This information is not intended as a substitute for professional medical care. Always follow your healthcare professional's instructions.    Keep ear clean and dry   Dry ear precautions    DO NOT USE CPAP FOR 5 DAYS      RTC 3 weekd  Do not strain, cough and sneeze with mouth open, do not bend over or lift over 10 pounds    Call md with any ear drainage or swelling, severe pain, facial weakness or other concerning symptoms        Admission Information     Date & Time Provider Department CSN    5/16/2017  6:03 AM Markus Castro MD [facility] [id number]      Care Providers     Provider Role Specialty Primary office phone    Markus Castro MD Attending Provider  "Otolaryngology [phone]    Markus Castro MD Surgeon  Otolaryngology [phone]      Your Vitals Were     BP Pulse Temp Resp Height Weight    106/70 (BP Location: Left arm, Patient Position: Lying, BP Method: Automatic) 65 98.7 °F (37.1 °C) (Tympanic) 16 5' 4" (1.626 m) 83.9 kg (185 lb)    SpO2 BMI             95% 31.76 kg/m2         Recent Lab Values        11/26/2014                           9:04 AM           A1C 5.6                       Allergies as of 5/16/2017     No Known Allergies      Advance Directives     An advance directive is a document which, in the event you are no longer able to make decisions for yourself, tells your healthcare team what kind of treatment you do or do not want to receive, or who you would like to make those decisions for you.  If you do not currently have an advance directive, [facility] encourages you to create one.  For more information call:  [phone]), [phone] ([phone]),  or log on to [email or web address].        Language Assistance Services     ATTENTION: Language assistance services are available, free of charge. Please call [phone].      ATENCIÓN: Si habla español, tiene a sheikh disposición servicios gratuitos de asistencia lingüística. Llame al [phone].     CHÚ Ý: N?u b?n nói Ti?ng Vi?t, có các d?ch v? h? tr? ngôn ng? mi?n phí dành cho b?n. G?i s? [phone].         [facility] complies with applicable Federal civil rights laws and does not discriminate on the basis of race, color, national origin, age, disability, or sex.        "

## 2017-05-16 NOTE — PLAN OF CARE
Patient reassessed. Pt reports that she feels better; denies nausea or dizziness. Dr. Alamo notified. Patient states she is ready for discharge to home. Iv removed tip intact.

## 2017-05-16 NOTE — TRANSFER OF CARE
"Anesthesia Transfer of Care Note    Patient: Abbey Vargas    Procedure(s) Performed: Procedure(s) (LRB):  STAPEDECTOMY (Left)    Patient location: PACU    Anesthesia Type: general    Transport from OR: Transported from OR on 6-10 L/min O2 by face mask with adequate spontaneous ventilation    Post pain: adequate analgesia    Post assessment: no apparent anesthetic complications    Post vital signs: stable    Level of consciousness: sedated and responds to stimulation    Nausea/Vomiting: no nausea/vomiting    Complications: none          Last vitals:   Visit Vitals    BP (!) 91/55    Pulse 75    Temp 36.7 °C (98 °F) (Oral)    Resp 14    Ht 5' 4" (1.626 m)    Wt 83.9 kg (185 lb)    SpO2 98%    Breastfeeding No    BMI 31.76 kg/m2     "
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
## 2017-05-16 NOTE — PLAN OF CARE
Patient sitting up in bed. Patient sitting with eyes closed. Patient reports that she thinks that the dizziness and nausea are gone. Will continue to monitor and attempt to sit up at bedside.

## 2017-05-17 VITALS
HEART RATE: 80 BPM | HEIGHT: 64 IN | RESPIRATION RATE: 18 BRPM | BODY MASS INDEX: 31.58 KG/M2 | DIASTOLIC BLOOD PRESSURE: 74 MMHG | OXYGEN SATURATION: 98 % | WEIGHT: 185 LBS | TEMPERATURE: 99 F | SYSTOLIC BLOOD PRESSURE: 129 MMHG

## 2017-06-07 ENCOUNTER — OFFICE VISIT (OUTPATIENT)
Dept: OTOLARYNGOLOGY | Facility: CLINIC | Age: 67
End: 2017-06-07
Payer: MEDICARE

## 2017-06-07 ENCOUNTER — CLINICAL SUPPORT (OUTPATIENT)
Dept: AUDIOLOGY | Facility: CLINIC | Age: 67
End: 2017-06-07
Payer: MEDICARE

## 2017-06-07 VITALS — HEIGHT: 64 IN | WEIGHT: 183 LBS | BODY MASS INDEX: 31.24 KG/M2

## 2017-06-07 DIAGNOSIS — Z98.890 S/P EAR SURGERY: Primary | ICD-10-CM

## 2017-06-07 PROCEDURE — 99999 PR PBB SHADOW E&M-EST. PATIENT-LVL I: CPT | Mod: PBBFAC,,,

## 2017-06-07 PROCEDURE — 99024 POSTOP FOLLOW-UP VISIT: CPT | Mod: S$GLB,,, | Performed by: OTOLARYNGOLOGY

## 2017-06-07 PROCEDURE — 92557 COMPREHENSIVE HEARING TEST: CPT | Mod: 52,S$GLB,, | Performed by: AUDIOLOGIST

## 2017-06-07 PROCEDURE — 99999 PR PBB SHADOW E&M-EST. PATIENT-LVL III: CPT | Mod: PBBFAC,,, | Performed by: OTOLARYNGOLOGY

## 2017-06-07 RX ORDER — ETODOLAC 400 MG/1
400 TABLET, FILM COATED ORAL DAILY
COMMUNITY
End: 2017-07-03

## 2017-06-07 NOTE — PROGRESS NOTES
6/7/2017    AUDIOLOGICAL EVALUATION:    Abbey Vargas was seen for an audiological evaluation on 6/7/2017 following left ear surgery to monitor hearing.    Pure tone threshold testing for the left ear revealed a mild to severe mixed hearing loss in the left ear.  A speech reception threshold was obtained at 35dBHL for the left ear with 96% speech discrimination score.    Recommend:  1.  Otologic evaluation.  2.  Follow up audio as needed.

## 2017-06-07 NOTE — PROGRESS NOTES
PO 3 wk left stapes    Packing removed. Flap healing well. T.M. Intact. No evidence of infection or other problems.          Great result    F/U as needed

## 2017-07-13 DIAGNOSIS — I77.0: Primary | ICD-10-CM

## 2017-07-24 ENCOUNTER — HOSPITAL ENCOUNTER (OUTPATIENT)
Dept: VASCULAR SURGERY | Facility: CLINIC | Age: 67
Discharge: HOME | End: 2017-07-24
Attending: SURGERY
Payer: MEDICARE

## 2017-07-24 ENCOUNTER — INITIAL CONSULT (OUTPATIENT)
Dept: VASCULAR SURGERY | Facility: CLINIC | Age: 67
End: 2017-07-24
Payer: MEDICARE

## 2017-07-24 VITALS
HEART RATE: 81 BPM | BODY MASS INDEX: 30.56 KG/M2 | TEMPERATURE: 99 F | WEIGHT: 179 LBS | SYSTOLIC BLOOD PRESSURE: 116 MMHG | DIASTOLIC BLOOD PRESSURE: 68 MMHG | HEIGHT: 64 IN

## 2017-07-24 DIAGNOSIS — M79.89 RIGHT LEG SWELLING: Primary | ICD-10-CM

## 2017-07-24 DIAGNOSIS — I77.0: ICD-10-CM

## 2017-07-24 DIAGNOSIS — I87.2 VENOUS INSUFFICIENCY: ICD-10-CM

## 2017-07-24 PROCEDURE — 99203 OFFICE O/P NEW LOW 30 MIN: CPT | Mod: S$GLB,,, | Performed by: SURGERY

## 2017-07-24 PROCEDURE — 99999 PR PBB SHADOW E&M-EST. PATIENT-LVL III: CPT | Mod: PBBFAC,,, | Performed by: SURGERY

## 2017-07-24 PROCEDURE — 93970 EXTREMITY STUDY: CPT | Mod: S$GLB,,, | Performed by: SURGERY

## 2017-07-24 NOTE — PROGRESS NOTES
Abbey Vargas  07/24/2017  Referred by: ABBEY Alvarado NP  HPI:  Patient is a 66 y.o. female with HTN, HLD who is here today for evaluation of right leg swelling with varicosities.  Pt. Reports that on 6/16/17, she had a right arthroscopy and meniscus repair?? At which time she developed a knot in her vein on the anterior right calf below the knee and had leg swelling.  The knot diminished, but the right leg swelling has persisted primarily in the ankles x 5 weeks.  She denies pain and denies having these issues prior to the knee arthroscopy.  She has never worn compression stockings.  She denies pain. She stated that she may have a right knee replacement in the future and is concerned her right leg may worsen following the procedure.    No MI/stroke  Tobacco use: Never smoker  History of DVT/PE: No  No personal or family history of clotting disorders    Past Medical History:   Diagnosis Date    Arthritis     Carpal tunnel syndrome of right wrist 2000    Care for it here @ [facility]    Hypertension     Hypoactive thyroid     Muscle pain     Ptosis     Sleep apnea 2000    Care here at [facility] on CPAP     Past Surgical History:   Procedure Laterality Date    CARPAL TUNNEL RELEASE Right     CATARACT EXTRACTION Left 03/21/2017    FRACTURE SURGERY Left     arm    GANGLION CYST EXCISION Right     wrist    HEMORRHOID SURGERY       Family History   Problem Relation Age of Onset    Arthritis Mother     Scleroderma Mother     Cancer Mother     Hypertension Mother     Glaucoma Mother     Cataracts Mother     Breast cancer Mother     Cancer Father     Breast cancer Maternal Grandmother      Social History     Social History    Marital status:      Spouse name: N/A    Number of children: N/A    Years of education: 12     Occupational History    Not on file.     Social History Main Topics    Smoking status: Never Smoker    Smokeless tobacco: Never Used    Alcohol use 0.0 oz/week      Comment: occasional     Drug use: No    Sexual activity: No     Other Topics Concern    Are You Pregnant Or Think You May Be? No    Breast-Feeding No     Social History Narrative    No narrative on file       Current Outpatient Prescriptions:     amitriptyline (ELAVIL) 50 MG tablet, Take 1 tablet (50 mg total) by mouth every evening., Disp: 90 tablet, Rfl: 3    aspirin (ECOTRIN) 81 MG EC tablet, Take 81 mg by mouth once daily., Disp: , Rfl:     BIOTIN ORAL, Take by mouth., Disp: , Rfl:     calcium-vitamin D3 (CALCIUM 500 + D) 500 mg(1,250mg) -200 unit per tablet, Take 1 tablet by mouth 2 (two) times daily with meals., Disp: , Rfl:     cyclobenzaprine (FLEXERIL) 5 MG tablet, Take 5 mg by mouth 3 (three) times daily as needed., Disp: , Rfl: 1    fish oil-omega-3 fatty acids 300-1,000 mg capsule, Take 2 g by mouth once daily., Disp: , Rfl:     levothyroxine (SYNTHROID) 100 MCG tablet, TAKE 1 TABLET BY MOUTH EVERY DAY, Disp: 90 tablet, Rfl: 3    lisinopril 10 MG tablet, TAKE ONE TABLET BY MOUTH EVERY EVENING, Disp: 90 tablet, Rfl: 0    meloxicam (MOBIC) 7.5 MG tablet, TAKE ONE TABLET BY MOUTH EVERY EVENING, Disp: 90 tablet, Rfl: 0    multivitamin (THERAGRAN) per tablet, Take 1 tablet by mouth once daily., Disp: , Rfl:     pravastatin (PRAVACHOL) 80 MG tablet, Take 1 tablet (80 mg total) by mouth once daily., Disp: 90 tablet, Rfl: 3    REVIEW OF SYSTEMS:  General: negative; ENT: negative; Allergy and Immunology: negative; Hematological and Lymphatic: Negative; Endocrine: negative; Respiratory: no cough, shortness of breath, or wheezing; Cardiovascular: no chest pain or dyspnea on exertion; Gastrointestinal: no abdominal pain/back, change in bowel habits, or bloody stools; Genito-Urinary: no dysuria, trouble voiding, or hematuria; Musculoskeletal: Leg discomfort secondary to chronic venous insufficiency; Neurological: no TIA or stroke symptoms; Psychiatric: no nervousness, anxiety or depression.    PHYSICAL EXAM:   Right Arm  BP - Sittin/75 (17 1310)  Left Arm BP - Sittin/68 (17 1310)  Pulse: 81  Temp: 98.5 °F (36.9 °C)    General appearance:  Alert, well-appearing, and in no distress.  Oriented to person, place, and time   Neurological: Normal speech, no focal findings noted; CN II - XII grossly intact           Musculoskeletal: Digits/nail without cyanosis/clubbing.  Normal muscle strength/tone.                 Neck: Supple, no significant adenopathy; thyroid is not enlarged                  Carotid bruits cannot be auscultated                Chest:  Clear to auscultation, no wheezes, rales or rhonchi, symmetric air entry     No use of accessory muscles             Cardiac: Normal rate and regular rhythm, S1 and S2 normal; PMI non-displaced          Abdomen: Soft, nontender, nondistended, no masses or organomegaly     No rebound tenderness noted; bowel sounds normal     No palpable Pulsatile aortic mass is .      Extremities:   2+ femoral pulses bilaterally     Bilateral pedal pulses palpable.     Edema/leg swelling:  Minimal ankle swelling right leg     Hyperpigmentation: None     Right 1 cm purple venous area of congestion     Mild telangiectasias noted to bilateral legs     LAB RESULTS:  Lab Results   Component Value Date    K 4.5 2017    K 4.3 2016    K 3.8 2016    CREATININE 0.9 2017    CREATININE 0.9 2016    CREATININE 0.8 2016     Lab Results   Component Value Date    WBC 6.60 2017    WBC 5.30 2016    WBC 6.00 2016    HCT 42.2 2017    HCT 39.1 2016    HCT 41.9 2016     2017     2016     2016     Lab Results   Component Value Date    HGBA1C 5.6 2014     IMAGING:  Venous U/S: 17  R GSV: 4.1 mm; significant reflux noted in the GSV including the SFJ  L GSV: none    R SSV: 3.2 mm; reflux including the SPJ  L SSV: 3.7 mm; reflux including the SPJ  No DVT    1. Right leg swelling          IMP/PLAN:  66 y.o. female with HTN and HLD with mild right leg swelling that is present ~ 6 weeks post right knee arthroscopy.  I don't believe she requires further workup or treatment beyond conservative measures.      1. Rx for Compression stockings (15-20 mmHg thigh high) given + elevation   2. F/u prn       Giovanni Hensley MD  Vascular & Endovascular Surgery

## 2017-07-24 NOTE — LETTER
July 24, 2017      Ama Alvarado, NP  [address]  [place] [address]           [facility] - Vascular Surgery  [address]  [facility] [address]  Phone: [phone]  Fax: [phone]          Patient: Abbey Vargas   MR Number: [id number]   YOB: 1950   Date of Visit: 7/24/2017       Dear Ama Alvarado:    Thank you for referring Abbey Vargas to me for evaluation. Attached you will find relevant portions of my assessment and plan of care.    If you have questions, please do not hesitate to call me. I look forward to following Abbey Vargas along with you.    Sincerely,    Giovanni Hensley MD    Enclosure  CC:  No Recipients    If you would like to receive this communication electronically, please contact [email or web address] or [phone] to request more information on [employer] Link access.    For providers and/or their staff who would like to refer a patient to [facility], please contact us through our one-stop-shop provider referral line, [facility] , at [phone].    If you feel you have received this communication in error or would no longer like to receive these types of communications, please e-mail [email or web address]
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
## 2017-10-01 PROBLEM — S22.22XA CLOSED FRACTURE OF BODY OF STERNUM: Status: ACTIVE | Noted: 2017-10-01

## 2017-10-01 PROBLEM — R07.9 CHEST PAIN: Status: ACTIVE | Noted: 2017-10-01

## 2017-10-02 PROBLEM — S82.101G: Status: ACTIVE | Noted: 2017-10-02

## 2017-10-02 PROBLEM — M79.604 RIGHT LEG PAIN: Status: ACTIVE | Noted: 2017-10-02

## 2017-10-09 ENCOUNTER — OFFICE VISIT (OUTPATIENT)
Dept: URGENT CARE | Facility: CLINIC | Age: 67
End: 2017-10-09
Payer: MEDICARE

## 2017-10-09 VITALS
HEIGHT: 64 IN | RESPIRATION RATE: 18 BRPM | TEMPERATURE: 98 F | SYSTOLIC BLOOD PRESSURE: 103 MMHG | BODY MASS INDEX: 29.88 KG/M2 | HEART RATE: 70 BPM | DIASTOLIC BLOOD PRESSURE: 73 MMHG | WEIGHT: 175 LBS | OXYGEN SATURATION: 97 %

## 2017-10-09 DIAGNOSIS — S60.132A CONTUSION OF LEFT MIDDLE FINGER WITH DAMAGE TO NAIL, INITIAL ENCOUNTER: ICD-10-CM

## 2017-10-09 DIAGNOSIS — S62.663A CLOSED NONDISPLACED FRACTURE OF DISTAL PHALANX OF LEFT MIDDLE FINGER, INITIAL ENCOUNTER: Primary | ICD-10-CM

## 2017-10-09 PROCEDURE — 29130 APPL FINGER SPLINT STATIC: CPT | Mod: LT,S$GLB,, | Performed by: FAMILY MEDICINE

## 2017-10-09 PROCEDURE — 99204 OFFICE O/P NEW MOD 45 MIN: CPT | Mod: 25,S$GLB,, | Performed by: FAMILY MEDICINE

## 2017-10-09 NOTE — PATIENT INSTRUCTIONS
Please drink plenty of fluids.  Please get plenty of rest.  Please return here or go to the Emergency Department for any concerns or worsening of condition.  If you were prescribed a narcotic medication, do not drive or operate heavy equipment or machinery while taking these medications.  If you were not prescribed an anti-inflammatory medication, and if you do not have any history of stomach/intestinal ulcers, or kidney disease, or are not taking a blood thinner such as Coumadin, Plavix, Pradaxa, Eloquis, or Xaralta for example, it is OK to take over the counter Ibuprofen or Advil or Motrin or Aleve as directed.  Do not take these medications on an empty stomach.  Rest, ice, compression and elevation to the affected joint or limb as needed.    If you were given a sling, wear it for comfort until follow up as arranged.  If you were given or placed in a splint, wear it until your follow up visit or recheck.  If you  smoke, please stop smoking.       Please follow up with your primary care doctor or specialist as needed.    Ama Alvarado, YONIS  [phone]     Keep Ortho appointment Thursday as arranged.  Finger or Toe Fractures (Broken Finger or Toe)  A hard blow can break a bone in your toe or finger. Broken bones are also known as fractures. Even minor fractures need medical care. Without treatment, they may heal crooked, remain stiff, or develop other problems.    When to go to the Emergency Room (ER)  You may not always know when you have a fractured toe or finger. Apply ice to the injury right away. Then, seek medical care if:  · Your finger or toe is swollen or very painful.  · You cannot move your finger or toe normally.  · Your injured toe or finger is pale or blue.  · You are bleeding.  · A bone protrudes through your skin.  What to expect in the ER  A healthcare provider will ask about your injury and examine your toe or finger. You may have X-rays. Treatment will depend on the type of fracture you  have.  Toe fracture  Your healthcare provider may straighten a broken toe. You'll be given local anesthesia so you won't feel any discomfort during this procedure. Your injured toe may then be splinted by being taped to a toe next to it, or placed on a pad that's taped to your foot. Your healthcare provider may also ask you to apply ice and keep your foot elevated.  Finger fracture  Your healthcare provider may straighten a broken finger. A broken finger is likely to be placed in a metal splint. This helps straighten and protect the finger while it heals. Your healthcare provider may give you exercises to do as your injury heals, to prevent stiffness in your finger.  Date Last Reviewed: 9/28/2015 © 2000-2017 [email or web address]. [address] [place] [address], [place] [address]. All rights reserved. This information is not intended as a substitute for professional medical care. Always follow your healthcare professional's instructions.

## 2017-10-09 NOTE — PROGRESS NOTES
"Subjective:       Patient ID: Abbey Vargas is a 67 y.o. female.    Vitals:  height is 5' 4" (1.626 m) and weight is 79.4 kg (175 lb). Her oral temperature is 97.6 °F (36.4 °C). Her blood pressure is 103/73 and her pulse is 70. Her respiration is 18 and oxygen saturation is 97%.     Chief Complaint: Hand Pain (left)    Hand Pain    The incident occurred 6 to 12 hours ago. The incident occurred at home. The injury mechanism was a direct blow. The pain is present in the left fingers. The quality of the pain is described as aching and burning. The pain radiates to the left hand. The pain is at a severity of 10/10. The pain is severe. The pain has been constant since the incident. Associated symptoms include chest pain. Pertinent negatives include no numbness. Nothing aggravates the symptoms. The treatment provided no relief.     Review of Systems   Constitution: Negative for weakness and malaise/fatigue.   HENT: Negative for nosebleeds.    Cardiovascular: Positive for chest pain. Negative for syncope.   Respiratory: Negative for shortness of breath.    Skin: Positive for color change.   Musculoskeletal: Positive for joint pain and joint swelling. Negative for back pain and neck pain.   Gastrointestinal: Negative for abdominal pain.   Genitourinary: Negative for hematuria.   Neurological: Negative for dizziness and numbness.       Objective:      Physical Exam   Constitutional: She is oriented to person, place, and time. She appears well-developed and well-nourished. She is cooperative.  Non-toxic appearance. She does not appear ill. No distress.   HENT:   Head: Normocephalic and atraumatic. Head is without abrasion, without contusion and without laceration.   Right Ear: Hearing, tympanic membrane, external ear and ear canal normal. No hemotympanum.   Left Ear: Hearing, tympanic membrane, external ear and ear canal normal. No hemotympanum.   Nose: Nose normal. No mucosal edema, rhinorrhea or nasal deformity. No " epistaxis. Right sinus exhibits no maxillary sinus tenderness and no frontal sinus tenderness. Left sinus exhibits no maxillary sinus tenderness and no frontal sinus tenderness.   Mouth/Throat: Uvula is midline, oropharynx is clear and moist and mucous membranes are normal. No trismus in the jaw. Normal dentition. No uvula swelling. No posterior oropharyngeal erythema.   Eyes: Conjunctivae, EOM and lids are normal. Pupils are equal, round, and reactive to light. Right eye exhibits no discharge. Left eye exhibits no discharge. No scleral icterus.   Sclera clear bilat   Neck: Trachea normal, normal range of motion, full passive range of motion without pain and phonation normal. Neck supple. No spinous process tenderness and no muscular tenderness present. No neck rigidity. No tracheal deviation present.   Cardiovascular: Normal rate, regular rhythm, normal heart sounds, intact distal pulses and normal pulses.    Pulmonary/Chest: Effort normal and breath sounds normal. No respiratory distress.   Abdominal: Soft. Normal appearance and bowel sounds are normal. She exhibits no distension, no pulsatile midline mass and no mass. There is no tenderness.   Musculoskeletal: She exhibits no edema or deformity.        Right hand: She exhibits decreased range of motion, tenderness and swelling.        Hands:  Neurological: She is alert and oriented to person, place, and time. She has normal strength. No cranial nerve deficit or sensory deficit. She exhibits normal muscle tone. She displays no seizure activity. Coordination normal. GCS eye subscore is 4. GCS verbal subscore is 5. GCS motor subscore is 6.   Skin: Skin is warm, dry and intact. Capillary refill takes less than 2 seconds. No abrasion, no bruising, no burn, no ecchymosis and no laceration noted. She is not diaphoretic. No pallor.   Psychiatric: She has a normal mood and affect. Her speech is normal and behavior is normal. Judgment and thought content normal. Cognition  and memory are normal.   Nursing note and vitals reviewed.    Type of Interpretation: ED Physician (Independently Interpreted).  Radiology Procedure Done: Left Hand.  Interpretation: Comminuted Fx tip left long finger.      Assessment:       1. Closed nondisplaced fracture of distal phalanx of left middle finger, initial encounter    2. Contusion of left middle finger with damage to nail, initial encounter        Plan:    Finger splint placed.    Closed nondisplaced fracture of distal phalanx of left middle finger, initial encounter    Contusion of left middle finger with damage to nail, initial encounter  -     X-Ray Hand 3 view Left; Future; Expected date: 10/09/2017      Please drink plenty of fluids.  Please get plenty of rest.  Please return here or go to the Emergency Department for any concerns or worsening of condition.  If you were prescribed a narcotic medication, do not drive or operate heavy equipment or machinery while taking these medications.  If you were not prescribed an anti-inflammatory medication, and if you do not have any history of stomach/intestinal ulcers, or kidney disease, or are not taking a blood thinner such as Coumadin, Plavix, Pradaxa, Eloquis, or Xaralta for example, it is OK to take over the counter Ibuprofen or Advil or Motrin or Aleve as directed.  Do not take these medications on an empty stomach.  Rest, ice, compression and elevation to the affected joint or limb as needed.    If you were given a sling, wear it for comfort until follow up as arranged.  If you were given or placed in a splint, wear it until your follow up visit or recheck.  If you  smoke, please stop smoking.       Please follow up with your primary care doctor or specialist as needed.    Ama Alvarado, YONIS  [phone]

## 2017-12-18 ENCOUNTER — TELEPHONE (OUTPATIENT)
Dept: ORTHOPEDICS | Facility: CLINIC | Age: 67
End: 2017-12-18

## 2017-12-18 NOTE — TELEPHONE ENCOUNTER
----- Message from Isabella Curtis sent at 12/18/2017 10:45 AM CST -----  Contact: self  Pt called requesting a call back from both providers staff in regards to trying to schedule a np appt with an Ortho provider for her right knee. Pt needs a second opinion first and possibly may need to have a knee replacement done on her knee. She is coming from [facility] and they recommended that she see a provider at the main campus in [place]. Pt could be reached at [phone]

## 2017-12-18 NOTE — TELEPHONE ENCOUNTER
----- Message from Isabella Curtis sent at 12/18/2017 10:45 AM CST -----  Contact: self  Pt called requesting a call back from both providers staff in regards to trying to schedule a np appt with an Ortho provider for her right knee. Pt needs a second opinion first and possibly may need to have a knee replacement done on her knee. She is coming from [facility] [place] and they recommended that she see a provider at the main campus in [place]. Pt could be reached at [phone]

## 2017-12-19 ENCOUNTER — TELEPHONE (OUTPATIENT)
Dept: ORTHOPEDICS | Facility: CLINIC | Age: 67
End: 2017-12-19

## 2017-12-19 NOTE — TELEPHONE ENCOUNTER
----- Message from Jens Mcclain MD sent at 12/18/2017  1:04 PM CST -----  Contact: self  yes  ----- Message -----  From: Lacy Beasley MA  Sent: 12/18/2017  11:36 AM  To: Jens Mcclain MD    Will you see this pt?   ----- Message -----  From: Isabella Curtis  Sent: 12/18/2017  10:45 AM  To: Johny GUERRERO Staff, #    Pt called requesting a call back from both providers staff in regards to trying to schedule a np appt with an Ortho provider for her right knee. Pt needs a second opinion first and possibly may need to have a knee replacement done on her knee. She is coming from [facility] and they recommended that she see a provider at the main campus in [place]. Pt could be reached at [phone]

## 2018-01-09 ENCOUNTER — HOSPITAL ENCOUNTER (OUTPATIENT)
Dept: RADIOLOGY | Facility: HOSPITAL | Age: 68
Discharge: HOME | End: 2018-01-09
Attending: ORTHOPAEDIC SURGERY
Payer: MEDICARE

## 2018-01-09 ENCOUNTER — OFFICE VISIT (OUTPATIENT)
Dept: ORTHOPEDICS | Facility: CLINIC | Age: 68
End: 2018-01-09
Payer: MEDICARE

## 2018-01-09 VITALS — HEIGHT: 64 IN | BODY MASS INDEX: 30.63 KG/M2 | WEIGHT: 179.38 LBS

## 2018-01-09 DIAGNOSIS — M17.9 OSTEOARTHRITIS OF KNEE, UNSPECIFIED (CODE): Primary | ICD-10-CM

## 2018-01-09 DIAGNOSIS — M17.9 OSTEOARTHRITIS OF KNEE, UNSPECIFIED (CODE): ICD-10-CM

## 2018-01-09 PROCEDURE — 73560 X-RAY EXAM OF KNEE 1 OR 2: CPT | Mod: 26,RT,, | Performed by: RADIOLOGY

## 2018-01-09 PROCEDURE — 99204 OFFICE O/P NEW MOD 45 MIN: CPT | Mod: S$GLB,,, | Performed by: ORTHOPAEDIC SURGERY

## 2018-01-09 PROCEDURE — 99999 PR PBB SHADOW E&M-EST. PATIENT-LVL II: CPT | Mod: PBBFAC,,, | Performed by: ORTHOPAEDIC SURGERY

## 2018-01-09 PROCEDURE — 73560 X-RAY EXAM OF KNEE 1 OR 2: CPT | Mod: TC,RT

## 2018-01-09 NOTE — PROGRESS NOTES
Subjective:      Patient ID: Abbey Vargas is a 67 y.o. female.    Chief Complaint: Pain of the Right Knee    HPI   Abbey Vargas is a 67 y.o. female who presents to clinic for eval of R knee pain. Pt reports progressively worsening knee pain since may 2017. She saw an outside orthopaedic surgeon and had an arthroscopic debridement of her right knee 6/9/2017 with no improvement in her pain.  She is now requiring a cane to ambulate and walker for long distances. Pain is worse medial aspect of knee. Worse with weight bearing activity. No relief with NSAIDs.         Past Medical History:   Diagnosis Date    Arthritis     Carpal tunnel syndrome of right wrist 2000    [place] [facility] [place]    Hypertension     Hypoactive thyroid     Muscle pain     Ptosis     Sleep apnea 2000    [place] here at [facility] [place]     Past Surgical History:   Procedure Laterality Date    CARPAL TUNNEL RELEASE Right     CATARACT EXTRACTION Left 03/21/2017    FRACTURE SURGERY Left     arm    GANGLION CYST EXCISION Right     wrist    HEMORRHOID SURGERY      KNEE ARTHROSCOPY Right 06/09/2017    KNEE SURGERY      STAPEDECTOMY Left 05/16/2017     Family History   Problem Relation Age of Onset    Arthritis Mother     Scleroderma Mother     Cancer Mother     Hypertension Mother     Glaucoma Mother     Cataracts Mother     Breast cancer Mother     Cancer Father     Breast cancer Maternal Grandmother      Social History     Social History    Marital status:      Spouse name: N/A    Number of children: N/A    Years of education: 12     Occupational History    Not on file.     Social History Main Topics    Smoking status: Never Smoker    Smokeless tobacco: Never Used    Alcohol use 0.0 oz/week      Comment: occasional    Drug use: No    Sexual activity: No     Other Topics Concern    Are You Pregnant Or Think You May Be? No    Breast-Feeding No     Social History Narrative    No narrative on file      Current Outpatient Prescriptions on File Prior to Visit   Medication Sig Dispense Refill    amitriptyline (ELAVIL) 50 MG tablet Take 1 tablet (50 mg total) by mouth every evening. 90 tablet 3    BIOTIN ORAL Take 1 capsule by mouth once daily.       calcium-vitamin D3 (CALCIUM 500 + D) 500 mg(1,250mg) -200 unit per tablet Take 1 tablet by mouth 2 (two) times daily with meals.      cyclobenzaprine (FLEXERIL) 5 MG tablet Take 5 mg by mouth 3 (three) times daily as needed.  1    diclofenac sodium (VOLTAREN) 1 % Gel Apply 2 g topically 3 (three) times daily. 2 g 3    doxepin (ZONALON) 5 % cream       fish oil-omega-3 fatty acids 300-1,000 mg capsule Take 2 g by mouth once daily.      levothyroxine (SYNTHROID) 100 MCG tablet TAKE 1 TABLET BY MOUTH EVERY DAY 90 tablet 3    lidocaine-prilocaine (EMLA) cream Apply topically as needed.      lisinopril 10 MG tablet Take 1 tablet (10 mg total) by mouth every evening. 90 tablet 3    meloxicam (MOBIC) 7.5 MG tablet Take 1 tablet (7.5 mg total) by mouth every evening. 90 tablet 3    multivitamin (THERAGRAN) per tablet Take 1 tablet by mouth once daily.      pravastatin (PRAVACHOL) 80 MG tablet Take 1 tablet (80 mg total) by mouth once daily. 90 tablet 3    aspirin (ECOTRIN) 81 MG EC tablet Take 81 mg by mouth once daily.       No current facility-administered medications on file prior to visit.      Review of patient's allergies indicates:  No Known Allergies    Review of Systems   Constitution: Negative for chills, fever and night sweats.   HENT: Negative for hearing loss.    Eyes: Negative for blurred vision and double vision.   Cardiovascular: Negative for chest pain, claudication and leg swelling.   Respiratory: Negative for shortness of breath.    Endocrine: Negative for polydipsia, polyphagia and polyuria.   Hematologic/Lymphatic: Negative for adenopathy and bleeding problem. Does not bruise/bleed easily.   Skin: Negative for poor wound healing.  "  Gastrointestinal: Negative for diarrhea and heartburn.   Genitourinary: Negative for bladder incontinence.   Neurological: Negative for focal weakness, headaches, numbness, paresthesias and sensory change.   Psychiatric/Behavioral: The patient is not nervous/anxious.    Allergic/Immunologic: Negative for persistent infections.         Objective:      Body mass index is 30.78 kg/m².  Vitals:    18 1052   Weight: 81.4 kg (179 lb 5.5 oz)   Height: 5' 4" (1.626 m)             General Musculoskeletal Exam   Gait: normal       Right Knee Exam     Inspection   Erythema: absent  Scars: absent  Swelling: present  Effusion: present  Deformity: deformity  Bruising: absent    Tenderness   The patient is tender to palpation of the medial joint line.    Crepitus   The patient has crepitus of the patella.    Range of Motion   Extension: 5   Flexion:  110 abnormal     Tests   Ligament Examination Lachman: normal (-1 to 2mm)   MCL - Valgus: normal (0 to 2mm)  LCL - Varus: normal  Patella   Passive Patellar Tilt: neutral    Other   Sensation: normal    Left Knee Exam     Inspection   Erythema: absent  Scars: absent  Swelling: absent  Effusion: absent  Deformity: deformity  Bruising: absent    Tenderness   The patient is experiencing no tenderness.         Range of Motion   Extension: 0   Flexion: 130     Tests   Stability Lachman: normal (-1 to 2mm)   MCL - Valgus: normal (0 to 2mm)  LCL - Varus: normal (0 to 2mm)  Patella   Passive Patellar Tilt: neutral    Other   Sensation: normal    Muscle Strength   Right Lower Extremity   Hip Abduction: 5/5   Quadriceps:  5/5   Hamstrin/5   Left Lower Extremity   Hip Abduction: 5/5   Quadriceps:  5/5   Hamstrin/5     Reflexes     Left Side  Quadriceps:  2+    Right Side   Quadriceps:  2+    Vascular Exam     Right Pulses  Dorsalis Pedis:      2+          Left Pulses  Dorsalis Pedis:      2+          Edema  Right Lower Leg: absent  Left Lower Leg: absent            "   Assessment:       No diagnosis found.       Plan:       There are no diagnoses linked to this encounter.    - scheduled for R TKA  - pt to return to clinic for preop visit and labs

## 2018-01-10 DIAGNOSIS — M17.9 OSTEOARTHRITIS OF KNEE, UNSPECIFIED (CODE): Primary | ICD-10-CM

## 2018-01-12 ENCOUNTER — PATIENT MESSAGE (OUTPATIENT)
Dept: ORTHOPEDICS | Facility: CLINIC | Age: 68
End: 2018-01-12

## 2018-01-18 DIAGNOSIS — M79.606 PAIN OF LOWER EXTREMITY, UNSPECIFIED LATERALITY: ICD-10-CM

## 2018-01-18 DIAGNOSIS — Z91.89 AT RISK OF UTI: Primary | ICD-10-CM

## 2018-01-30 ENCOUNTER — OFFICE VISIT (OUTPATIENT)
Dept: ORTHOPEDICS | Facility: CLINIC | Age: 68
End: 2018-01-30
Payer: MEDICARE

## 2018-01-30 ENCOUNTER — HOSPITAL ENCOUNTER (OUTPATIENT)
Dept: PREADMISSION TESTING | Facility: HOSPITAL | Age: 68
Discharge: HOME | End: 2018-01-30
Attending: ANESTHESIOLOGY
Payer: MEDICARE

## 2018-01-30 ENCOUNTER — ANESTHESIA EVENT (OUTPATIENT)
Dept: SURGERY | Facility: HOSPITAL | Age: 68
DRG: 470 | End: 2018-01-30
Payer: MEDICARE

## 2018-01-30 ENCOUNTER — INITIAL CONSULT (OUTPATIENT)
Dept: INTERNAL MEDICINE | Facility: CLINIC | Age: 68
End: 2018-01-30
Payer: MEDICARE

## 2018-01-30 VITALS
HEART RATE: 79 BPM | WEIGHT: 180.31 LBS | BODY MASS INDEX: 30.78 KG/M2 | OXYGEN SATURATION: 98 % | TEMPERATURE: 99 F | DIASTOLIC BLOOD PRESSURE: 67 MMHG | HEIGHT: 64 IN | SYSTOLIC BLOOD PRESSURE: 133 MMHG

## 2018-01-30 VITALS — HEIGHT: 64 IN | BODY MASS INDEX: 30.48 KG/M2 | WEIGHT: 178.56 LBS

## 2018-01-30 DIAGNOSIS — I83.93 VARICOSE VEINS OF BOTH LOWER EXTREMITIES: ICD-10-CM

## 2018-01-30 DIAGNOSIS — Z01.818 PREOP EXAMINATION: Primary | ICD-10-CM

## 2018-01-30 DIAGNOSIS — E78.5 HYPERLIPIDEMIA, UNSPECIFIED HYPERLIPIDEMIA TYPE: ICD-10-CM

## 2018-01-30 DIAGNOSIS — I10 ESSENTIAL HYPERTENSION: ICD-10-CM

## 2018-01-30 DIAGNOSIS — N18.30 CKD (CHRONIC KIDNEY DISEASE) STAGE 3, GFR 30-59 ML/MIN: ICD-10-CM

## 2018-01-30 DIAGNOSIS — M17.11 PRIMARY OSTEOARTHRITIS OF RIGHT KNEE: Primary | ICD-10-CM

## 2018-01-30 DIAGNOSIS — E07.9 THYROID DISORDER: ICD-10-CM

## 2018-01-30 DIAGNOSIS — Z98.890 PONV (POSTOPERATIVE NAUSEA AND VOMITING): ICD-10-CM

## 2018-01-30 DIAGNOSIS — R76.8 POSITIVE ANA (ANTINUCLEAR ANTIBODY): ICD-10-CM

## 2018-01-30 DIAGNOSIS — R11.2 PONV (POSTOPERATIVE NAUSEA AND VOMITING): ICD-10-CM

## 2018-01-30 DIAGNOSIS — Z79.1 NSAID LONG-TERM USE: ICD-10-CM

## 2018-01-30 DIAGNOSIS — G47.33 OSA (OBSTRUCTIVE SLEEP APNEA): ICD-10-CM

## 2018-01-30 PROBLEM — R07.9 CHEST PAIN: Status: RESOLVED | Noted: 2017-10-01 | Resolved: 2018-01-30

## 2018-01-30 PROCEDURE — 99999 PR PBB SHADOW E&M-EST. PATIENT-LVL III: CPT | Mod: PBBFAC,,, | Performed by: PHYSICIAN ASSISTANT

## 2018-01-30 PROCEDURE — 99999 PR PBB SHADOW E&M-EST. PATIENT-LVL III: CPT | Mod: PBBFAC,,, | Performed by: HOSPITALIST

## 2018-01-30 PROCEDURE — 99214 OFFICE O/P EST MOD 30 MIN: CPT | Mod: S$GLB,,, | Performed by: HOSPITALIST

## 2018-01-30 PROCEDURE — 99499 UNLISTED E&M SERVICE: CPT | Mod: S$GLB,,, | Performed by: PHYSICIAN ASSISTANT
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
## 2018-01-30 NOTE — DISCHARGE INSTRUCTIONS
Your surgery has been scheduled for:__________________________________________    You should report to:  ____[facility], located on the [place] side of the first floor of the           [facility] ([phone])  ____[facility], located on the [facility] side of the            Second floor of the [facility] ([phone])  ____3rd Floor [facility] located on the [facility] side of the [facility] [phone]  Please Note   - Tell your doctor if you take Aspirin, products containing Aspirin, herbal medications  or blood thinners, such as Coumadin, Ticlid, or Plavix.  (Consult your provider regarding holding or stopping before surgery).  - Arrange for someone to drive you home following surgery.  You will not be allowed to leave the surgical facility alone or drive yourself home following sedation and anesthesia.  Before Surgery  - Stop taking all herbal medications 14days prior to surgery  - No Motrin/Advil (Ibuprofen) 7 days before surgery  - No Aleve (Naproxen) 7 days before surgery  - Stop Taking Asprin, products containing Asprin _____days before surgery  - Stop taking blood thinners_______days before surgery  - Refrain from drinking alcoholic beverages for 24hours before and after surgery  - Stop or limit smoking _________days before surgery  Night before Surgery  - DO NOT EAT OR DRINK ANYTHING AFTER MIDNIGHT, INCLUDING GUM, HARD CANDY, MINTS, OR CHEWING TOBACCO.  - Take a shower or bath (shower is recommended).  Bathe with Hibiclens soap or an antibacterial soap from the neck down.  If not supplied by your surgeon, hibiclens soap will need to be purchased over the counter in pharmacy.  Rinse soap off thoroughly.  - Shampoo your hair with your regular shampoo  The Day of Surgery  - Take another bath or shower with hibiclens or any antibacterial soap, to reduce the chance of infection.  - Take heart and blood  pressure medications with a small sip of water, as advised by the perioperative team.  - Do not take fluid pills  - You may brush your teeth and rinse your mouth, but do not swall any additional water.   - Do not apply perfumes, powder, body lotions or deodorant on the day of surgery.  - Nail polish should be removed.  - Do not wear makeup or moisturizer  - Wear comfortable clothes, such as a button front shirt and loose fitting pants.  - Leave all jewelry, including body piercings, and valuables at home.    - Bring any devices you will neeed after surgery such as crutches or canes.  - If you have sleep apnea, please bring your CPAP machine  In the event that your physical condition changes including the onset of a cold or respiratory illness, or if you have to delay or cancel your surgery, please notify your surgeon.  Anesthesia: Regional Anesthesia  Youre scheduled for surgery. During surgery, youll receive medication called anesthesia to keep you comfortable and pain-free. Your surgeon has decided that youll receive regional anesthesia. This sheet tells you what to expect with this type of anesthesia.  What Is Regional Anesthesia?  Regional anesthesia numbs one region of your body. The anesthesia may be given around nerves or into veins in your arms, neck, or legs (nerve block or Whetstone block). Or it may be sent into the spinal fluid (spinal anesthesia) or into the space just outside the spinal fluid (epidural anesthesia). Sedatives may also be given to relax you.  Nerve Block or Whetstone Block  A small area of the body, such as an arm or leg, can be numbed using a nerve block or Juan block.  · Nerve block: During a nerve block, your skin is numbed. A needle is then inserted near nerves that serve the area to be numbed. Anesthetic is sent through the needle.  · IV regional or Whetstone block: For this type of block, an IV line is put into a vein. The blood flow to the area to be numbed is blocked for a short time.  Anesthetic is sent through the IV.  Spinal Anesthesia  Spinal anesthesia numbs your body from about the waist down.  · Anesthetic is injected into the spinal fluid. This is a substance that surrounds the spinal cord in your spinal column. The anesthetic blocks pain traveling from the body to the brain.  · To receive the anesthetic, your skin is numbed at the injection site.  · A needle is then inserted into the spinal fluid. Anesthetic is sent through the needle.  Anesthesia Tools and Medications  · Local anesthetic given through a needle numbs one region of your body.  · Electrocardiography leads (electrodes) record your heart rate and rhythm.  · A blood pressure cuff monitors your blood pressure.  · A pulse oximeter on the end of the finger measures your blood oxygen level.  · Sedatives may be given through an IV to relax you and keep you comfortable. You may stay awake or sleep slightly.  · Oxygen may be given to you through a facemask.  Risks and Possible Complications  Regional anesthesia carries some risks. These include:  · Nausea and vomiting  · Headache  · Backache  · Decreased blood pressure  · Allergic reaction to the anesthetic  · Ongoing numbness (rare)  · Irregular heartbeat (rare)  · Cardiac arrest (rare)   © 2440-8040 [facility], [address] [place] [address], [place] [address]. All rights reserved. This information is not intended as a substitute for professional medical care. Always follow your healthcare professional's instructions
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
## 2018-01-30 NOTE — ASSESSMENT & PLAN NOTE
Hypertension-  Blood pressure is acceptable . I suggest continuation of Lisinopril ( taking nightly )- during the entire perioperative period. I suggest  blood pressure, electrolyte and renal function monitoring .I suggest addressing pain control as uncontrolled pain can increased blood pressure

## 2018-01-30 NOTE — ASSESSMENT & PLAN NOTE
Increased risk of thrombosis, compression stocking use discussed   Edema- I suggested avoidance of added salt,avoidance of NSAID's , except prescribed , except ASA and suggested Limb elevation and aliza hose use

## 2018-01-30 NOTE — OUTPATIENT SUBJECTIVE & OBJECTIVE
Outpatient Subjective & Objective     Chief complaint-Preoperative evaluation, Perioperative Medical management, complication reduction plan     Active cardiac conditions- none    Revised cardiac risk index predictors- none    Functional capacity -Examples of physical activity, less limited due to knee problem,   house work and can take a flight of stairs holding on to the railing----- She can undertake all the above activities without  chest pain,chest tightness, Shortness of breath ,dizziness,lightheadedness making her exercise tolerance morethan 4 Mets.       Review of Systems   Constitutional: Negative for chills and fever.        No unusual weight changes   HENT:        Sleep apnea   Eyes:        Had eye check last week  Under follow up   Respiratory:         cough , phlegm  Post nasal drip  Does not feel sick     Cardiovascular:        As noted   Gastrointestinal:        No overt GI/ blood losses  Bowel movements-  Constipated- had colonoscopy - Gets periodically    Endocrine:        Prednisone use > 20 mg daily for 3 weeks- none   Genitourinary: Negative for dysuria.        No urinary hesitancy    Musculoskeletal:        As above      Skin: Negative for rash.   Neurological: Negative for syncope.        No unilateral weakness   Hematological:        ASA- prevention   Psychiatric/Behavioral:        Depression  Anxiety     None     No vascular stenting         Past Surgical History:   Procedure Laterality Date    CARPAL TUNNEL RELEASE Right     CATARACT EXTRACTION Left 03/21/2017    FRACTURE SURGERY Left     arm    GANGLION CYST EXCISION Right     wrist    HEMORRHOID SURGERY      KNEE ARTHROSCOPY Right 06/09/2017    KNEE SURGERY      STAPEDECTOMY Left 05/16/2017       No  bleeding, cardiac problems with previous surgeries/procedures.  Medications and Allergies reviewed in epic.   FH- No  thrombosis/ bleeding , early onset heart disease in family   Lives with a disable daughter . Friend going to  "help    Physical Exam   HENT:   Head: Normocephalic.       Physical Exam   HENT:   Head: Normocephalic.     Constitutional- Vitals - Body mass index is 30.95 kg/m².,   Vitals:    01/30/18 1054   BP: 133/67   Pulse: 79   Temp: 99 °F (37.2 °C)     General appearance-Conscious,Coherent  Eyes- No conjunctival icterus,pupils  round  and reactive to light   ENT-Oral cavity- moist  , Hearing grossly normal   Neck- No thyromegaly ,Trachea -central, No jugular venous distension,   No Carotid Bruit   Cardiovascular -Heart Sounds- Normal  and  no murmur   , No gallop rhythm   Respiratory - Normal Respiratory Effort, Normal breath sounds and  no wheeze    Peripheral pitting pedal edema-- mild,  varicose veins right lower extremity  and  varicose veins left lower extremity, no calf pain   Gastrointestinal -Soft abdomen, No palpable masses, Non Tender,Liver,Spleen not palpable. No-- free fluid and shifting dullness  Musculoskeletal- No finger Clubbing. Strength grossly normal   Lymphatic-No Palpable cervical, axillary,Inguinal lymphadenopathy   Psychiatric - normal effect,Orientation  Rt Dorsalis pedis pulses-palpable    Lt Dorsalis pedis pulses- palpable   Rt Posterior tibial pulses -palpable   Left posterior tibial pulses -palpable   Miscellaneous -  Surgical scarLeft wrist   and  no renal bruit    Blood pressure 133/67, pulse 79, temperature 99 °F (37.2 °C), temperature source Oral, height 5' 4" (1.626 m), weight 81.8 kg (180 lb 4.8 oz), SpO2 98 %.      Investigations  Lab and Imaging have been reviewed in [facility].    Review of Medicine tests    EKG- I had independently reviewed the EKG from--9/30/2017   It was reported to be showing     Normal sinus rhythm  Normal ECG  When compared with ECG of 06-MAR-2017 14:33,  Vent. rate has increased BY  34 BPM  QT has lengthened    Review of clinical lab tests-Date--- Creatinine-  Date--1/30/2018 Hemoglobin-N - Platelet count--N, INR-N      Review of old records- Was done and information " gathered regards to events leading to surgery and health conditions of significance in the perioperative period.    Outpatient Subjective & Objective

## 2018-01-30 NOTE — ASSESSMENT & PLAN NOTE
Stages of CKD discussed    I  suggest monitoring renal function, in put and out put status [place]-operatively. I  suggest avoiding nephrotoxic medication including NSAIDs, COX2 inhibitors, intravenous contrast agent,avoiding hypotension to prevent further renal impairment.

## 2018-01-31 RX ORDER — MIDAZOLAM HYDROCHLORIDE 5 MG/ML
1 INJECTION INTRAMUSCULAR; INTRAVENOUS EVERY 5 MIN PRN
Status: CANCELLED | OUTPATIENT
Start: 2018-01-31

## 2018-01-31 RX ORDER — ONDANSETRON 2 MG/ML
4 INJECTION INTRAMUSCULAR; INTRAVENOUS EVERY 12 HOURS PRN
Status: CANCELLED | OUTPATIENT
Start: 2018-01-31

## 2018-01-31 RX ORDER — RAMELTEON 8 MG/1
8 TABLET ORAL NIGHTLY PRN
Status: CANCELLED | OUTPATIENT
Start: 2018-01-31

## 2018-01-31 RX ORDER — MORPHINE SULFATE 10 MG/ML
2 INJECTION, SOLUTION INTRAMUSCULAR; INTRAVENOUS
Status: CANCELLED | OUTPATIENT
Start: 2018-01-31

## 2018-01-31 RX ORDER — BISACODYL 10 MG
10 SUPPOSITORY, RECTAL RECTAL EVERY 12 HOURS PRN
Status: CANCELLED | OUTPATIENT
Start: 2018-01-31

## 2018-01-31 RX ORDER — PREGABALIN 25 MG/1
75 CAPSULE ORAL NIGHTLY
Status: CANCELLED | OUTPATIENT
Start: 2018-01-31

## 2018-01-31 RX ORDER — SODIUM CHLORIDE 0.9 % (FLUSH) 0.9 %
3 SYRINGE (ML) INJECTION EVERY 8 HOURS PRN
Status: CANCELLED | OUTPATIENT
Start: 2018-01-31

## 2018-01-31 RX ORDER — POLYETHYLENE GLYCOL 3350 17 G/17G
17 POWDER, FOR SOLUTION ORAL DAILY
Status: CANCELLED | OUTPATIENT
Start: 2018-01-31

## 2018-01-31 RX ORDER — ACETAMINOPHEN 500 MG
1000 TABLET ORAL EVERY 6 HOURS
Status: CANCELLED | OUTPATIENT
Start: 2018-01-31 | End: 2018-02-02

## 2018-01-31 RX ORDER — SODIUM CHLORIDE 9 MG/ML
INJECTION, SOLUTION INTRAVENOUS
Status: CANCELLED | OUTPATIENT
Start: 2018-01-31

## 2018-01-31 RX ORDER — AMOXICILLIN 250 MG
1 CAPSULE ORAL 2 TIMES DAILY
Status: CANCELLED | OUTPATIENT
Start: 2018-01-31

## 2018-01-31 RX ORDER — ONDANSETRON 4 MG/1
4 TABLET, ORALLY DISINTEGRATING ORAL ONCE
Status: CANCELLED | OUTPATIENT
Start: 2018-01-31 | End: 2018-01-31

## 2018-01-31 RX ORDER — OXYCODONE HYDROCHLORIDE 5 MG/1
5 TABLET ORAL
Status: CANCELLED | OUTPATIENT
Start: 2018-01-31

## 2018-01-31 RX ORDER — LIDOCAINE HYDROCHLORIDE 10 MG/ML
1 INJECTION, SOLUTION EPIDURAL; INFILTRATION; INTRACAUDAL; PERINEURAL
Status: CANCELLED | OUTPATIENT
Start: 2018-01-31

## 2018-01-31 RX ORDER — ROPIVACAINE HYDROCHLORIDE 2 MG/ML
8 INJECTION, SOLUTION EPIDURAL; INFILTRATION; PERINEURAL CONTINUOUS
Status: CANCELLED | OUTPATIENT
Start: 2018-01-31

## 2018-01-31 RX ORDER — MUPIROCIN 20 MG/G
1 OINTMENT TOPICAL
Status: CANCELLED | OUTPATIENT
Start: 2018-01-31

## 2018-01-31 RX ORDER — OXYCODONE HYDROCHLORIDE 5 MG/1
10 TABLET ORAL
Status: CANCELLED | OUTPATIENT
Start: 2018-01-31

## 2018-01-31 RX ORDER — ACETAMINOPHEN 10 MG/ML
1000 INJECTION, SOLUTION INTRAVENOUS ONCE
Status: CANCELLED | OUTPATIENT
Start: 2018-01-31 | End: 2018-01-31

## 2018-01-31 RX ORDER — SODIUM CHLORIDE 9 MG/ML
INJECTION, SOLUTION INTRAVENOUS CONTINUOUS
Status: CANCELLED | OUTPATIENT
Start: 2018-01-31 | End: 2018-02-01

## 2018-01-31 RX ORDER — FENTANYL CITRATE 50 UG/ML
25 INJECTION, SOLUTION INTRAMUSCULAR; INTRAVENOUS EVERY 5 MIN PRN
Status: CANCELLED | OUTPATIENT
Start: 2018-01-31

## 2018-01-31 RX ORDER — FAMOTIDINE 20 MG/1
20 TABLET, FILM COATED ORAL 2 TIMES DAILY
Status: CANCELLED | OUTPATIENT
Start: 2018-01-31

## 2018-01-31 RX ORDER — NALOXONE HCL 0.4 MG/ML
0.02 VIAL (ML) INJECTION
Status: CANCELLED | OUTPATIENT
Start: 2018-01-31

## 2018-01-31 RX ORDER — ASPIRIN 325 MG
325 TABLET, DELAYED RELEASE (ENTERIC COATED) ORAL 2 TIMES DAILY
Status: CANCELLED | OUTPATIENT
Start: 2018-01-31

## 2018-01-31 RX ORDER — PREGABALIN 25 MG/1
75 CAPSULE ORAL
Status: CANCELLED | OUTPATIENT
Start: 2018-01-31

## 2018-01-31 NOTE — H&P
CC: Right knee pain    Abbey Vargas is a 67 y.o. female with 9 month history of Right knee pain. Pain is worse with activity and weight bearing.  Patient has experienced interference of activities of daily living due to decreased range of motion and an increase in joint pain and swelling.  Patient has failed non-operative treatment including NSAIDs, corticosteroid injections, viscosupplement injections, and activity modification.  Abbey Vargas currently ambulates using assistive device. She had arthroscopy of right knee at an outside facility in June of 2017.    Past Medical History:   Diagnosis Date    Arthritis     Carpal tunnel syndrome of right wrist 2000    Care for it here @ [facility] [place]    CKD (chronic kidney disease) stage 3, GFR 30-59 ml/min 1/30/2018    Depression     Hypertension     Hypoactive thyroid     Muscle pain     Ptosis     Sleep apnea 2000    Care here at [facility] [place] on CPAP       Past Surgical History:   Procedure Laterality Date    CARPAL TUNNEL RELEASE Right     CATARACT EXTRACTION Left 03/21/2017    FRACTURE SURGERY Left     arm    GANGLION CYST EXCISION Right     wrist    HEMORRHOID SURGERY      KNEE ARTHROSCOPY Right 06/09/2017    KNEE SURGERY      STAPEDECTOMY Left 05/16/2017       Family History   Problem Relation Age of Onset    Arthritis Mother     Scleroderma Mother     Cancer Mother     Hypertension Mother     Glaucoma Mother     Cataracts Mother     Breast cancer Mother     Cancer Father      colon    Breast cancer Maternal Grandmother        Review of patient's allergies indicates:  No Known Allergies      Current Outpatient Prescriptions:     amitriptyline (ELAVIL) 50 MG tablet, Take 1 tablet (50 mg total) by mouth every evening., Disp: 90 tablet, Rfl: 3    aspirin (ECOTRIN) 81 MG EC tablet, Take 81 mg by mouth every evening. , Disp: , Rfl:     BIOTIN ORAL, Take 1 capsule by mouth once daily. , Disp: , Rfl:     calcium-vitamin D3 (CALCIUM 500 + D) 500  "mg(1,250mg) -200 unit per tablet, Take 1 tablet by mouth 2 (two) times daily with meals., Disp: , Rfl:     cyclobenzaprine (FLEXERIL) 5 MG tablet, Take 5 mg by mouth 3 (three) times daily as needed., Disp: , Rfl: 1    diclofenac sodium (VOLTAREN) 1 % Gel, Apply 2 g topically 3 (three) times daily., Disp: 2 g, Rfl: 3    DOCUSATE SODIUM (STOOL SOFTENER ORAL), Take by mouth once daily., Disp: , Rfl:     doxepin (ZONALON) 5 % cream, , Disp: , Rfl:     fish oil-omega-3 fatty acids 300-1,000 mg capsule, Take 2 g by mouth once daily., Disp: , Rfl:     levothyroxine (SYNTHROID) 100 MCG tablet, TAKE 1 TABLET BY MOUTH EVERY DAY, Disp: 90 tablet, Rfl: 3    lidocaine-prilocaine (EMLA) cream, Apply topically as needed., Disp: , Rfl:     lisinopril 10 MG tablet, Take 1 tablet (10 mg total) by mouth every evening., Disp: 90 tablet, Rfl: 3    meloxicam (MOBIC) 7.5 MG tablet, Take 1 tablet (7.5 mg total) by mouth every evening., Disp: 90 tablet, Rfl: 3    multivitamin (THERAGRAN) per tablet, Take 1 tablet by mouth once daily., Disp: , Rfl:     pravastatin (PRAVACHOL) 80 MG tablet, Take 1 tablet (80 mg total) by mouth once daily. (Patient taking differently: Take 80 mg by mouth every evening. ), Disp: 90 tablet, Rfl: 3    Review of Systems:  Constitutional: no fever or chills  Eyes: no visual changes  ENT: no nasal congestion or sore throat  Respiratory: no cough or shortness of breath  Cardiovascular: no chest pain or palpitations  Gastrointestinal: no nausea or vomiting, tolerating diet  Genitourinary: no hematuria or dysuria  Integument/Breast: no rash or pruritis  Hematologic/Lymphatic: no easy bruising or lymphadenopathy  Musculoskeletal: positive for right knee  Neurological: no seizures or tremors  Behavioral/Psych: no auditory or visual hallucinations  Endocrine: no heat or cold intolerance    PE:  Ht 5' 4" (1.626 m)   Wt 81 kg (178 lb 9.2 oz)   BMI 30.65 kg/m²   General: Pleasant, cooperative, NAD   Gait: " antalgic  HEENT: NCAT, sclera nonicteric   Lungs: Respirations clear bilaterally; equal and unlabored.   CV: S1S2; 2+ bilateral upper and lower extremity pulses.   Skin: Intact throughout with no rashes, erythema, or lesions  Extremities: No LE edema,  no erythema or warmth of the skin in either lower extremity.    Right knee exam:  Knee Range of Motion:5-110 active, pain with passive range of motion  Effusion:none  Condition of skin:intact  Location of tenderness:Medial joint line   Strength:5 of 5 quadriceps strength and 5 of 5 hamstring strength  Stability: stable to testing    Hip Examination:full painless range of motion, without tenderness    Radiographs: Radiographs reveal advanced degenerative changes including subchondral cyst formation, subchondral sclerosis, osteophyte formation, joint space narrowing.     Knee Alignment:  Significiant varus    Diagnosis: osteoarthritis Right knee    Plan: Right total knee arthroplasty    Due to the serious nature of total joint infection and the high prevalence of community acquired MRSA, vancomycin will be used perioperatively.

## 2018-01-31 NOTE — PROGRESS NOTES
Abbey Vargas is a 67 y.o. year old here today for a pre-operative visit in preparation for a Right total knee arthroplasty to be performed by  Dr. [facility] on 2/7/2018.  she was last seen and treated in the clinic on 1/9/2018. she will be medically optimized by the pre op center. There has been no significant change in medical status since last visit. No fever, chills, malaise, or unexplained weight change.      Allergies, Medications, past medical and surgical history reviewed.    Focused examination performed.    Dr. [facility] saw this patient today in clinic. All questions answered. Patient encouraged to call with questions. Contact information given.     Pre, [place], and post operative procedures and expectations discussed. Questions were answered. Abbey Vargas has been educated and is ready to proceed with surgery. Approximately 30 minutes was spent discussing surgical outcomes, plans, procedures pre, [place], and post operative expections and care.  Surgical consent signed.    Abbey Vargas will contact us if there are any questions, concerns, or changes in medical status prior to surgery.

## 2018-02-06 ENCOUNTER — PATIENT MESSAGE (OUTPATIENT)
Dept: SURGERY | Facility: HOSPITAL | Age: 68
End: 2018-02-06

## 2018-02-06 ENCOUNTER — TELEPHONE (OUTPATIENT)
Dept: ORTHOPEDICS | Facility: CLINIC | Age: 68
End: 2018-02-06

## 2018-02-06 ENCOUNTER — PATIENT MESSAGE (OUTPATIENT)
Dept: ORTHOPEDICS | Facility: CLINIC | Age: 68
End: 2018-02-06

## 2018-02-06 NOTE — TELEPHONE ENCOUNTER
we spoke : Reminded to eat light the night before surgery, NPO after midnight, take hibclens shower the night before surgery  & again in the am , sleep on clean sheets  in clean clothes, no laxatives or stool softeners , report to the 2nd floor surgery unit for 6 am tomorrow  She voiced understanding  Will stay in the [facility]

## 2018-02-07 ENCOUNTER — HOSPITAL ENCOUNTER (INPATIENT)
Facility: HOSPITAL | Age: 68
LOS: 1 days | Discharge: HOME HEALTH | DRG: 470 | End: 2018-02-08
Attending: ORTHOPAEDIC SURGERY | Admitting: ORTHOPAEDIC SURGERY
Payer: MEDICARE

## 2018-02-07 ENCOUNTER — ANESTHESIA (OUTPATIENT)
Dept: SURGERY | Facility: HOSPITAL | Age: 68
DRG: 470 | End: 2018-02-07
Payer: MEDICARE

## 2018-02-07 DIAGNOSIS — M17.11 PRIMARY OSTEOARTHRITIS OF RIGHT KNEE: Primary | ICD-10-CM

## 2018-02-07 LAB
ANION GAP SERPL CALC-SCNC: 6 MMOL/L
BUN SERPL-MCNC: 14 MG/DL
CALCIUM SERPL-MCNC: 8.4 MG/DL
CHLORIDE SERPL-SCNC: 109 MMOL/L
CO2 SERPL-SCNC: 27 MMOL/L
CREAT SERPL-MCNC: 0.8 MG/DL
EST. GFR  (AFRICAN AMERICAN): >60 ML/MIN/1.73 M^2
EST. GFR  (NON AFRICAN AMERICAN): >60 ML/MIN/1.73 M^2
GLUCOSE SERPL-MCNC: 102 MG/DL
POTASSIUM SERPL-SCNC: 4.4 MMOL/L
SODIUM SERPL-SCNC: 142 MMOL/L

## 2018-02-07 PROCEDURE — C1776 JOINT DEVICE (IMPLANTABLE): HCPCS | Performed by: ORTHOPAEDIC SURGERY

## 2018-02-07 PROCEDURE — 36415 COLL VENOUS BLD VENIPUNCTURE: CPT

## 2018-02-07 PROCEDURE — 27447 TOTAL KNEE ARTHROPLASTY: CPT | Mod: RT,,, | Performed by: ORTHOPAEDIC SURGERY

## 2018-02-07 PROCEDURE — 76942 ECHO GUIDE FOR BIOPSY: CPT | Performed by: ANESTHESIOLOGY

## 2018-02-07 PROCEDURE — 63600175 PHARM REV CODE 636 W HCPCS: Performed by: ORTHOPAEDIC SURGERY

## 2018-02-07 PROCEDURE — 36000710: Performed by: ORTHOPAEDIC SURGERY

## 2018-02-07 PROCEDURE — 37000009 HC ANESTHESIA EA ADD 15 MINS: Performed by: ORTHOPAEDIC SURGERY

## 2018-02-07 PROCEDURE — 99900035 HC TECH TIME PER 15 MIN (STAT)

## 2018-02-07 PROCEDURE — 88305 TISSUE EXAM BY PATHOLOGIST: CPT | Mod: 26,,, | Performed by: PATHOLOGY

## 2018-02-07 PROCEDURE — 71000033 HC RECOVERY, INTIAL HOUR: Performed by: ORTHOPAEDIC SURGERY

## 2018-02-07 PROCEDURE — 71000039 HC RECOVERY, EACH ADD'L HOUR: Performed by: ORTHOPAEDIC SURGERY

## 2018-02-07 PROCEDURE — 97530 THERAPEUTIC ACTIVITIES: CPT

## 2018-02-07 PROCEDURE — 25000003 PHARM REV CODE 250: Performed by: PHYSICIAN ASSISTANT

## 2018-02-07 PROCEDURE — 88311 DECALCIFY TISSUE: CPT | Mod: 26,,, | Performed by: PATHOLOGY

## 2018-02-07 PROCEDURE — 97535 SELF CARE MNGMENT TRAINING: CPT

## 2018-02-07 PROCEDURE — 27201423 OPTIME MED/SURG SUP & DEVICES STERILE SUPPLY: Performed by: ORTHOPAEDIC SURGERY

## 2018-02-07 PROCEDURE — 63600175 PHARM REV CODE 636 W HCPCS: Performed by: PHYSICIAN ASSISTANT

## 2018-02-07 PROCEDURE — 63600175 PHARM REV CODE 636 W HCPCS: Performed by: STUDENT IN AN ORGANIZED HEALTH CARE EDUCATION/TRAINING PROGRAM

## 2018-02-07 PROCEDURE — 80048 BASIC METABOLIC PNL TOTAL CA: CPT

## 2018-02-07 PROCEDURE — C1713 ANCHOR/SCREW BN/BN,TIS/BN: HCPCS | Performed by: ORTHOPAEDIC SURGERY

## 2018-02-07 PROCEDURE — 64450 NJX AA&/STRD OTHER PN/BRANCH: CPT | Mod: 59,RT,, | Performed by: ANESTHESIOLOGY

## 2018-02-07 PROCEDURE — 25000003 PHARM REV CODE 250: Performed by: PAIN MEDICINE

## 2018-02-07 PROCEDURE — 27000221 HC OXYGEN, UP TO 24 HOURS

## 2018-02-07 PROCEDURE — 97161 PT EVAL LOW COMPLEX 20 MIN: CPT

## 2018-02-07 PROCEDURE — 94799 UNLISTED PULMONARY SVC/PX: CPT

## 2018-02-07 PROCEDURE — D9220A PRA ANESTHESIA: Mod: ,,, | Performed by: ANESTHESIOLOGY

## 2018-02-07 PROCEDURE — 88305 TISSUE EXAM BY PATHOLOGIST: CPT | Performed by: PATHOLOGY

## 2018-02-07 PROCEDURE — 36000711: Performed by: ORTHOPAEDIC SURGERY

## 2018-02-07 PROCEDURE — 0SRC0J9 REPLACEMENT OF RIGHT KNEE JOINT WITH SYNTHETIC SUBSTITUTE, CEMENTED, OPEN APPROACH: ICD-10-PCS | Performed by: ORTHOPAEDIC SURGERY

## 2018-02-07 PROCEDURE — 37000008 HC ANESTHESIA 1ST 15 MINUTES: Performed by: ORTHOPAEDIC SURGERY

## 2018-02-07 PROCEDURE — 94761 N-INVAS EAR/PLS OXIMETRY MLT: CPT

## 2018-02-07 PROCEDURE — 94770 HC EXHALED C02 TEST: CPT

## 2018-02-07 PROCEDURE — 25000003 PHARM REV CODE 250: Performed by: STUDENT IN AN ORGANIZED HEALTH CARE EDUCATION/TRAINING PROGRAM

## 2018-02-07 PROCEDURE — 27000190 HC CPAP FULL FACE MASK W/VALVE

## 2018-02-07 PROCEDURE — 11000001 HC ACUTE MED/SURG PRIVATE ROOM

## 2018-02-07 PROCEDURE — 27200664 HC NERVE BLOCK COMPLETE KIT: Performed by: ANESTHESIOLOGY

## 2018-02-07 PROCEDURE — 64448 NJX AA&/STRD FEM NRV NFS IMG: CPT | Mod: 59,RT,, | Performed by: ANESTHESIOLOGY

## 2018-02-07 PROCEDURE — 94660 CPAP INITIATION&MGMT: CPT

## 2018-02-07 PROCEDURE — 97165 OT EVAL LOW COMPLEX 30 MIN: CPT

## 2018-02-07 DEVICE — PATELLA PSN CEM POLY 8X29MM: Type: IMPLANTABLE DEVICE | Site: KNEE | Status: FUNCTIONAL

## 2018-02-07 DEVICE — PSN FEM PS CMT STD SZ 5 R: Type: IMPLANTABLE DEVICE | Site: KNEE | Status: FUNCTIONAL

## 2018-02-07 DEVICE — BASEPLATE TIB PSN CEM SZ C 5 R: Type: IMPLANTABLE DEVICE | Site: KNEE | Status: FUNCTIONAL

## 2018-02-07 DEVICE — PLUG BONE #5: Type: IMPLANTABLE DEVICE | Site: KNEE | Status: FUNCTIONAL

## 2018-02-07 DEVICE — CEMENT BONE SIMPLE P INDIVID: Type: IMPLANTABLE DEVICE | Site: KNEE | Status: FUNCTIONAL

## 2018-02-07 RX ORDER — RAMELTEON 8 MG/1
8 TABLET ORAL NIGHTLY PRN
Status: DISCONTINUED | OUTPATIENT
Start: 2018-02-07 | End: 2018-02-08 | Stop reason: HOSPADM

## 2018-02-07 RX ORDER — SODIUM CHLORIDE 9 MG/ML
INJECTION, SOLUTION INTRAVENOUS CONTINUOUS
Status: ACTIVE | OUTPATIENT
Start: 2018-02-07 | End: 2018-02-08

## 2018-02-07 RX ORDER — FENTANYL CITRATE 50 UG/ML
25 INJECTION, SOLUTION INTRAMUSCULAR; INTRAVENOUS EVERY 5 MIN PRN
Status: CANCELLED | OUTPATIENT
Start: 2018-02-07

## 2018-02-07 RX ORDER — OXYCODONE HYDROCHLORIDE 5 MG/1
5 TABLET ORAL
Status: DISCONTINUED | OUTPATIENT
Start: 2018-02-07 | End: 2018-02-08 | Stop reason: HOSPADM

## 2018-02-07 RX ORDER — PRAVASTATIN SODIUM 20 MG/1
80 TABLET ORAL NIGHTLY
Status: DISCONTINUED | OUTPATIENT
Start: 2018-02-07 | End: 2018-02-07

## 2018-02-07 RX ORDER — MORPHINE SULFATE 2 MG/ML
2 INJECTION, SOLUTION INTRAMUSCULAR; INTRAVENOUS
Status: DISCONTINUED | OUTPATIENT
Start: 2018-02-07 | End: 2018-02-08 | Stop reason: HOSPADM

## 2018-02-07 RX ORDER — ONDANSETRON 4 MG/1
4 TABLET, ORALLY DISINTEGRATING ORAL ONCE
Status: DISCONTINUED | OUTPATIENT
Start: 2018-02-07 | End: 2018-02-08 | Stop reason: HOSPADM

## 2018-02-07 RX ORDER — PHENYLEPHRINE HYDROCHLORIDE 10 MG/ML
INJECTION INTRAVENOUS
Status: DISCONTINUED | OUTPATIENT
Start: 2018-02-07 | End: 2018-02-07

## 2018-02-07 RX ORDER — POLYETHYLENE GLYCOL 3350 17 G/17G
17 POWDER, FOR SOLUTION ORAL DAILY
Status: DISCONTINUED | OUTPATIENT
Start: 2018-02-07 | End: 2018-02-08 | Stop reason: HOSPADM

## 2018-02-07 RX ORDER — DOCUSATE SODIUM 100 MG/1
100 CAPSULE, LIQUID FILLED ORAL 2 TIMES DAILY PRN
Qty: 60 CAPSULE | Refills: 0 | Status: SHIPPED | OUTPATIENT
Start: 2018-02-07 | End: 2018-08-14

## 2018-02-07 RX ORDER — CEFAZOLIN SODIUM 1 G/3ML
2 INJECTION, POWDER, FOR SOLUTION INTRAMUSCULAR; INTRAVENOUS
Status: DISPENSED | OUTPATIENT
Start: 2018-02-07 | End: 2018-02-08

## 2018-02-07 RX ORDER — OXYCODONE HYDROCHLORIDE 5 MG/1
10 TABLET ORAL
Status: DISCONTINUED | OUTPATIENT
Start: 2018-02-07 | End: 2018-02-08 | Stop reason: HOSPADM

## 2018-02-07 RX ORDER — OXYCODONE AND ACETAMINOPHEN 5; 325 MG/1; MG/1
1-2 TABLET ORAL
Qty: 90 TABLET | Refills: 0 | Status: SHIPPED | OUTPATIENT
Start: 2018-02-07 | End: 2018-02-20

## 2018-02-07 RX ORDER — BISACODYL 10 MG
10 SUPPOSITORY, RECTAL RECTAL EVERY 12 HOURS PRN
Status: DISCONTINUED | OUTPATIENT
Start: 2018-02-07 | End: 2018-02-08 | Stop reason: HOSPADM

## 2018-02-07 RX ORDER — ASPIRIN 325 MG
325 TABLET, DELAYED RELEASE (ENTERIC COATED) ORAL 2 TIMES DAILY
Status: DISCONTINUED | OUTPATIENT
Start: 2018-02-07 | End: 2018-02-08 | Stop reason: HOSPADM

## 2018-02-07 RX ORDER — CEFAZOLIN SODIUM 1 G/3ML
2 INJECTION, POWDER, FOR SOLUTION INTRAMUSCULAR; INTRAVENOUS
Status: COMPLETED | OUTPATIENT
Start: 2018-02-07 | End: 2018-02-07

## 2018-02-07 RX ORDER — ACETAMINOPHEN 500 MG
1000 TABLET ORAL EVERY 6 HOURS
Status: DISCONTINUED | OUTPATIENT
Start: 2018-02-07 | End: 2018-02-08 | Stop reason: HOSPADM

## 2018-02-07 RX ORDER — ACETAMINOPHEN 10 MG/ML
1000 INJECTION, SOLUTION INTRAVENOUS ONCE
Status: COMPLETED | OUTPATIENT
Start: 2018-02-07 | End: 2018-02-07

## 2018-02-07 RX ORDER — SODIUM CHLORIDE 0.9 % (FLUSH) 0.9 %
3 SYRINGE (ML) INJECTION
Status: CANCELLED | OUTPATIENT
Start: 2018-02-07

## 2018-02-07 RX ORDER — LEVOTHYROXINE SODIUM 25 UG/1
100 TABLET ORAL DAILY
Status: DISCONTINUED | OUTPATIENT
Start: 2018-02-08 | End: 2018-02-07

## 2018-02-07 RX ORDER — PREGABALIN 75 MG/1
75 CAPSULE ORAL NIGHTLY
Status: DISCONTINUED | OUTPATIENT
Start: 2018-02-07 | End: 2018-02-08 | Stop reason: HOSPADM

## 2018-02-07 RX ORDER — LIDOCAINE HYDROCHLORIDE 10 MG/ML
1 INJECTION, SOLUTION EPIDURAL; INFILTRATION; INTRACAUDAL; PERINEURAL
Status: COMPLETED | OUTPATIENT
Start: 2018-02-07 | End: 2018-02-07

## 2018-02-07 RX ORDER — FENTANYL CITRATE 50 UG/ML
25 INJECTION, SOLUTION INTRAMUSCULAR; INTRAVENOUS EVERY 5 MIN PRN
Status: DISCONTINUED | OUTPATIENT
Start: 2018-02-07 | End: 2018-02-07 | Stop reason: HOSPADM

## 2018-02-07 RX ORDER — DEXAMETHASONE SODIUM PHOSPHATE 4 MG/ML
INJECTION, SOLUTION INTRA-ARTICULAR; INTRALESIONAL; INTRAMUSCULAR; INTRAVENOUS; SOFT TISSUE
Status: DISCONTINUED | OUTPATIENT
Start: 2018-02-07 | End: 2018-02-07

## 2018-02-07 RX ORDER — ONDANSETRON 8 MG/1
8 TABLET, ORALLY DISINTEGRATING ORAL EVERY 12 HOURS PRN
Qty: 20 TABLET | Refills: 0 | Status: SHIPPED | OUTPATIENT
Start: 2018-02-07 | End: 2018-02-20

## 2018-02-07 RX ORDER — SODIUM CHLORIDE 9 MG/ML
INJECTION, SOLUTION INTRAVENOUS CONTINUOUS PRN
Status: DISCONTINUED | OUTPATIENT
Start: 2018-02-07 | End: 2018-02-07

## 2018-02-07 RX ORDER — AMITRIPTYLINE HYDROCHLORIDE 50 MG/1
50 TABLET, FILM COATED ORAL NIGHTLY
Status: DISCONTINUED | OUTPATIENT
Start: 2018-02-07 | End: 2018-02-08 | Stop reason: HOSPADM

## 2018-02-07 RX ORDER — FAMOTIDINE 20 MG/1
20 TABLET, FILM COATED ORAL 2 TIMES DAILY
Status: DISCONTINUED | OUTPATIENT
Start: 2018-02-07 | End: 2018-02-08 | Stop reason: HOSPADM

## 2018-02-07 RX ORDER — ONDANSETRON 2 MG/ML
INJECTION INTRAMUSCULAR; INTRAVENOUS
Status: DISCONTINUED | OUTPATIENT
Start: 2018-02-07 | End: 2018-02-07

## 2018-02-07 RX ORDER — GENTAMICIN SULFATE 40 MG/ML
INJECTION, SOLUTION INTRAMUSCULAR; INTRAVENOUS
Status: DISCONTINUED | OUTPATIENT
Start: 2018-02-07 | End: 2018-02-07 | Stop reason: HOSPADM

## 2018-02-07 RX ORDER — AMOXICILLIN 250 MG
1 CAPSULE ORAL 2 TIMES DAILY
Status: DISCONTINUED | OUTPATIENT
Start: 2018-02-07 | End: 2018-02-08 | Stop reason: HOSPADM

## 2018-02-07 RX ORDER — PRAVASTATIN SODIUM 40 MG/1
80 TABLET ORAL NIGHTLY
Status: DISCONTINUED | OUTPATIENT
Start: 2018-02-07 | End: 2018-02-08 | Stop reason: HOSPADM

## 2018-02-07 RX ORDER — PREGABALIN 75 MG/1
75 CAPSULE ORAL
Status: COMPLETED | OUTPATIENT
Start: 2018-02-07 | End: 2018-02-07

## 2018-02-07 RX ORDER — PROPOFOL 10 MG/ML
INJECTION, EMULSION INTRAVENOUS CONTINUOUS PRN
Status: DISCONTINUED | OUTPATIENT
Start: 2018-02-07 | End: 2018-02-07

## 2018-02-07 RX ORDER — MIDAZOLAM HYDROCHLORIDE 1 MG/ML
INJECTION, SOLUTION INTRAMUSCULAR; INTRAVENOUS
Status: DISCONTINUED | OUTPATIENT
Start: 2018-02-07 | End: 2018-02-07

## 2018-02-07 RX ORDER — LISINOPRIL 10 MG/1
10 TABLET ORAL NIGHTLY
Status: DISCONTINUED | OUTPATIENT
Start: 2018-02-08 | End: 2018-02-07

## 2018-02-07 RX ORDER — LIDOCAINE HCL/PF 100 MG/5ML
SYRINGE (ML) INTRAVENOUS
Status: DISCONTINUED | OUTPATIENT
Start: 2018-02-07 | End: 2018-02-07

## 2018-02-07 RX ORDER — CALCIUM CARBONATE 500(1250)
1000 TABLET ORAL
Status: DISCONTINUED | OUTPATIENT
Start: 2018-02-08 | End: 2018-02-08 | Stop reason: HOSPADM

## 2018-02-07 RX ORDER — KETAMINE HYDROCHLORIDE 100 MG/ML
INJECTION, SOLUTION INTRAMUSCULAR; INTRAVENOUS
Status: DISCONTINUED | OUTPATIENT
Start: 2018-02-07 | End: 2018-02-07

## 2018-02-07 RX ORDER — MUPIROCIN 20 MG/G
1 OINTMENT TOPICAL
Status: COMPLETED | OUTPATIENT
Start: 2018-02-07 | End: 2018-02-07

## 2018-02-07 RX ORDER — SODIUM CHLORIDE 9 MG/ML
INJECTION, SOLUTION INTRAVENOUS
Status: COMPLETED | OUTPATIENT
Start: 2018-02-07 | End: 2018-02-07

## 2018-02-07 RX ORDER — ONDANSETRON 2 MG/ML
4 INJECTION INTRAMUSCULAR; INTRAVENOUS EVERY 8 HOURS PRN
Status: DISCONTINUED | OUTPATIENT
Start: 2018-02-07 | End: 2018-02-08 | Stop reason: HOSPADM

## 2018-02-07 RX ORDER — ROPIVACAINE HYDROCHLORIDE 2 MG/ML
8 INJECTION, SOLUTION EPIDURAL; INFILTRATION; PERINEURAL CONTINUOUS
Status: DISCONTINUED | OUTPATIENT
Start: 2018-02-07 | End: 2018-02-08 | Stop reason: HOSPADM

## 2018-02-07 RX ORDER — LEVOTHYROXINE SODIUM 100 UG/1
100 TABLET ORAL NIGHTLY
Status: DISCONTINUED | OUTPATIENT
Start: 2018-02-07 | End: 2018-02-08 | Stop reason: HOSPADM

## 2018-02-07 RX ORDER — SODIUM CHLORIDE 0.9 % (FLUSH) 0.9 %
3 SYRINGE (ML) INJECTION EVERY 8 HOURS PRN
Status: DISCONTINUED | OUTPATIENT
Start: 2018-02-07 | End: 2018-02-08 | Stop reason: HOSPADM

## 2018-02-07 RX ORDER — ASPIRIN 325 MG
325 TABLET ORAL 2 TIMES DAILY
Qty: 60 TABLET | Refills: 0 | Status: SHIPPED | OUTPATIENT
Start: 2018-02-07 | End: 2018-03-09

## 2018-02-07 RX ORDER — NALOXONE HCL 0.4 MG/ML
0.02 VIAL (ML) INJECTION
Status: DISCONTINUED | OUTPATIENT
Start: 2018-02-07 | End: 2018-02-08 | Stop reason: HOSPADM

## 2018-02-07 RX ORDER — SODIUM CHLORIDE 0.9 % (FLUSH) 0.9 %
3 SYRINGE (ML) INJECTION
Status: DISCONTINUED | OUTPATIENT
Start: 2018-02-07 | End: 2018-02-07 | Stop reason: HOSPADM

## 2018-02-07 RX ORDER — MIDAZOLAM HYDROCHLORIDE 1 MG/ML
1 INJECTION INTRAMUSCULAR; INTRAVENOUS EVERY 5 MIN PRN
Status: DISCONTINUED | OUTPATIENT
Start: 2018-02-07 | End: 2018-02-07 | Stop reason: HOSPADM

## 2018-02-07 RX ADMIN — VANCOMYCIN HYDROCHLORIDE 1500 MG: 1 INJECTION, POWDER, LYOPHILIZED, FOR SOLUTION INTRAVENOUS at 06:02

## 2018-02-07 RX ADMIN — PHENYLEPHRINE HYDROCHLORIDE 50 MCG: 10 INJECTION INTRAVENOUS at 09:02

## 2018-02-07 RX ADMIN — MEPIVACAINE HYDROCHLORIDE 2.4 ML: 20 INJECTION, SOLUTION EPIDURAL; INFILTRATION at 08:02

## 2018-02-07 RX ADMIN — PREGABALIN 75 MG: 75 CAPSULE ORAL at 09:02

## 2018-02-07 RX ADMIN — ACETAMINOPHEN 1000 MG: 500 TABLET ORAL at 11:02

## 2018-02-07 RX ADMIN — PHENYLEPHRINE HYDROCHLORIDE 100 MCG: 10 INJECTION INTRAVENOUS at 09:02

## 2018-02-07 RX ADMIN — LIDOCAINE HYDROCHLORIDE 80 MG: 20 INJECTION, SOLUTION INTRAVENOUS at 08:02

## 2018-02-07 RX ADMIN — POLYETHYLENE GLYCOL 3350 17 G: 17 POWDER, FOR SOLUTION ORAL at 10:02

## 2018-02-07 RX ADMIN — ACETAMINOPHEN 1000 MG: 500 TABLET ORAL at 05:02

## 2018-02-07 RX ADMIN — CEFAZOLIN 2 G: 330 INJECTION, POWDER, FOR SOLUTION INTRAMUSCULAR; INTRAVENOUS at 11:02

## 2018-02-07 RX ADMIN — LIDOCAINE HYDROCHLORIDE 10 MG: 10 INJECTION, SOLUTION EPIDURAL; INFILTRATION; INTRACAUDAL; PERINEURAL at 06:02

## 2018-02-07 RX ADMIN — DEXAMETHASONE SODIUM PHOSPHATE 8 MG: 4 INJECTION, SOLUTION INTRAMUSCULAR; INTRAVENOUS at 08:02

## 2018-02-07 RX ADMIN — OXYCODONE HYDROCHLORIDE 10 MG: 5 TABLET ORAL at 02:02

## 2018-02-07 RX ADMIN — ASPIRIN 325 MG: 325 TABLET, DELAYED RELEASE ORAL at 05:02

## 2018-02-07 RX ADMIN — STANDARDIZED SENNA CONCENTRATE AND DOCUSATE SODIUM 1 TABLET: 8.6; 5 TABLET, FILM COATED ORAL at 09:02

## 2018-02-07 RX ADMIN — PROPOFOL 50 MCG/KG/MIN: 10 INJECTION, EMULSION INTRAVENOUS at 08:02

## 2018-02-07 RX ADMIN — ACETAMINOPHEN 1000 MG: 10 INJECTION, SOLUTION INTRAVENOUS at 10:02

## 2018-02-07 RX ADMIN — SODIUM CHLORIDE, SODIUM GLUCONATE, SODIUM ACETATE, POTASSIUM CHLORIDE, MAGNESIUM CHLORIDE, SODIUM PHOSPHATE, DIBASIC, AND POTASSIUM PHOSPHATE: .53; .5; .37; .037; .03; .012; .00082 INJECTION, SOLUTION INTRAVENOUS at 10:02

## 2018-02-07 RX ADMIN — MIDAZOLAM HYDROCHLORIDE 2 MG: 1 INJECTION, SOLUTION INTRAMUSCULAR; INTRAVENOUS at 08:02

## 2018-02-07 RX ADMIN — ONDANSETRON 4 MG: 2 INJECTION INTRAMUSCULAR; INTRAVENOUS at 09:02

## 2018-02-07 RX ADMIN — OXYCODONE HYDROCHLORIDE 10 MG: 5 TABLET ORAL at 10:02

## 2018-02-07 RX ADMIN — OXYCODONE HYDROCHLORIDE 10 MG: 5 TABLET ORAL at 05:02

## 2018-02-07 RX ADMIN — AMITRIPTYLINE HYDROCHLORIDE 50 MG: 50 TABLET, FILM COATED ORAL at 09:02

## 2018-02-07 RX ADMIN — MUPIROCIN 1 G: 20 OINTMENT TOPICAL at 06:02

## 2018-02-07 RX ADMIN — PRAVASTATIN SODIUM 80 MG: 40 TABLET ORAL at 09:02

## 2018-02-07 RX ADMIN — MORPHINE SULFATE 2 MG: 2 INJECTION, SOLUTION INTRAMUSCULAR; INTRAVENOUS at 11:02

## 2018-02-07 RX ADMIN — OXYCODONE HYDROCHLORIDE 10 MG: 5 TABLET ORAL at 09:02

## 2018-02-07 RX ADMIN — SODIUM CHLORIDE: 0.9 INJECTION, SOLUTION INTRAVENOUS at 06:02

## 2018-02-07 RX ADMIN — SODIUM CHLORIDE, SODIUM GLUCONATE, SODIUM ACETATE, POTASSIUM CHLORIDE, MAGNESIUM CHLORIDE, SODIUM PHOSPHATE, DIBASIC, AND POTASSIUM PHOSPHATE: .53; .5; .37; .037; .03; .012; .00082 INJECTION, SOLUTION INTRAVENOUS at 09:02

## 2018-02-07 RX ADMIN — PREGABALIN 75 MG: 75 CAPSULE ORAL at 06:02

## 2018-02-07 RX ADMIN — FAMOTIDINE 20 MG: 20 TABLET, FILM COATED ORAL at 10:02

## 2018-02-07 RX ADMIN — SODIUM CHLORIDE: 0.9 INJECTION, SOLUTION INTRAVENOUS at 10:02

## 2018-02-07 RX ADMIN — MIDAZOLAM HYDROCHLORIDE 2 MG: 1 INJECTION, SOLUTION INTRAMUSCULAR; INTRAVENOUS at 07:02

## 2018-02-07 RX ADMIN — PHENYLEPHRINE HYDROCHLORIDE 100 MCG: 10 INJECTION INTRAVENOUS at 10:02

## 2018-02-07 RX ADMIN — LEVOTHYROXINE SODIUM 100 MCG: 100 TABLET ORAL at 09:02

## 2018-02-07 RX ADMIN — KETAMINE HYDROCHLORIDE 20 MG: 100 INJECTION, SOLUTION, CONCENTRATE INTRAMUSCULAR; INTRAVENOUS at 08:02

## 2018-02-07 RX ADMIN — CEFAZOLIN 2 G: 330 INJECTION, POWDER, FOR SOLUTION INTRAMUSCULAR; INTRAVENOUS at 08:02

## 2018-02-07 RX ADMIN — ROPIVACAINE HYDROCHLORIDE 8 ML/HR: 2 INJECTION, SOLUTION EPIDURAL; INFILTRATION at 10:02

## 2018-02-07 RX ADMIN — FAMOTIDINE 20 MG: 20 TABLET, FILM COATED ORAL at 09:02

## 2018-02-07 RX ADMIN — FENTANYL CITRATE 50 MCG: 50 INJECTION INTRAMUSCULAR; INTRAVENOUS at 07:02

## 2018-02-07 RX ADMIN — STANDARDIZED SENNA CONCENTRATE AND DOCUSATE SODIUM 1 TABLET: 8.6; 5 TABLET, FILM COATED ORAL at 10:02

## 2018-02-07 NOTE — BRIEF OP NOTE
[facility]  Surgery Department  Operative Note    SUMMARY     Date of Procedure: 2/7/2018     Procedure: Procedure(s) (LRB):  REPLACEMENT-KNEE-TOTAL (Right)     Surgeon(s) and Role:     * John L. [facility]ner Jr., MD - Primary    Assisting Surgeon: None    Pre-Operative Diagnosis: Osteoarthritis of knee, unspecified (CODE) [M17.9]    Post-Operative Diagnosis: Post-Op Diagnosis Codes:     * Osteoarthritis of knee, unspecified (CODE) [M17.9]    Anesthesia: Spinal    Technical Procedures Used:  PS       Description of the Findings of the Procedure: Varus    Significant Surgical Tasks Conducted by the Assistant(s), if Applicable: closure    Complications: No    Estimated Blood Loss (EBL): 100cc             Implants:   Implant Name Type Inv. Item Serial No.  Lot No. LRB No. Used   PLUG BONE #5 - [id number]  PLUG BONE #5  [employer]. [id number] Right 1   CEMENT BONE SIMPLE P INDIVID - [id number]  CEMENT BONE SIMPLE P INDIVID  [employer]. [id number] Right 1   SCREW HEX HEAD - [id number]  SCREW HEX HEAD  SYDNEY,INC  Right 1   SCREW HEX HEAD 3.5X38MM - [id number]  SCREW HEX HEAD 3.5X38MM  SYDNEY,INC  Right 2   DRILL PIN TROCAR 3.2MM - [id number]  DRILL PIN TROCAR 3.2MM  SYDNEY,INC  Right 2   PATELLA PSN JESSEE POLY 8X29MM - [id number]  PATELLA PSN JESSEE POLY 8X29MM  SYDNEY,INC [id number] Right 1   PSN FEM PS CMT STD SZ 5 R - [id number]  PSN FEM PS CMT STD SZ 5 R  SYDNEY,INC [id number] Right 1   BASEPLATE TIB PSN JESSEE SZ C 5 R - [id number]  BASEPLATE TIB PSN JESSEE SZ C 5 R  SYDNEY,INC [id number] Right 1   Persona  Vivacit E Highly Crosslinked Polyethelyne Articular Surface Fixed Bearing PS Right 13 mm  use with tibia sizes C-D / PS Femur Sizes 3-5         [id number] Right 1       Specimens:   Specimen (12h ago through future)    Start     Ordered    02/07/18 0922  Specimen to Pathology - Surgery  Once     Comments:  Permanent specimen to pathology;1.Bone and soft tissue right knee.placed in specimen fridge.       02/07/18 0922                  Condition: Good    Disposition: PACU - hemodynamically stable.    Attestation: I was present and scrubbed for the entire procedure.

## 2018-02-07 NOTE — ANESTHESIA POSTPROCEDURE EVALUATION
"Anesthesia Post Evaluation    Patient: Abbey Vargas    Procedure(s) Performed: Procedure(s) (LRB):  REPLACEMENT-KNEE-TOTAL (Right)    Final Anesthesia Type: spinal  Patient location during evaluation: PACU  Patient participation: Yes- Able to Participate  Level of consciousness: awake and alert  Post-procedure vital signs: reviewed and stable  Pain management: adequate  Airway patency: patent  PONV status at discharge: No PONV  Anesthetic complications: no      Cardiovascular status: blood pressure returned to baseline  Respiratory status: unassisted and spontaneous ventilation  Hydration status: euvolemic  Follow-up not needed.        Visit Vitals  BP (!) 166/80 (BP Location: Right arm, Patient Position: Lying)   Pulse 64   Temp 36.7 °C (98 °F) (Oral)   Resp 18   Ht 5' 4" (1.626 m)   Wt 80.7 kg (178 lb)   SpO2 95%   Breastfeeding? No   BMI 30.55 kg/m²       Pain/Lauryn Score: Pain Assessment Performed: Yes (2/7/2018  3:45 PM)  Presence of Pain: complains of pain/discomfort (2/7/2018  3:45 PM)  Pain Rating Prior to Med Admin: 6 (2/7/2018  2:17 PM)  Lauryn Score: 10 (2/7/2018  3:45 PM)      "

## 2018-02-07 NOTE — PLAN OF CARE
Problem: Occupational Therapy Goal  Goal: Occupational Therapy Goal  Goals to be met by: 7 days    Patient will increase functional independence with ADLs by performing:    UE Dressing with Supervision.  LE Dressing with Supervision with AD as needed.  Grooming while standing with Supervision.  Toileting from bedside commode with Supervision for hygiene and clothing management.   Stand pivot transfers with Supervision.  Toilet transfer to bedside commode with Supervision.       Outcome: Ongoing (interventions implemented as appropriate)  Pt goals established today based on her presenting functional level     Comments: Cont OT POC

## 2018-02-07 NOTE — PT/OT/SLP EVAL
Occupational Therapy   Evaluation    Name: Abbey Vargas  MRN: [id number]  Admitting Diagnosis:  <principal problem not specified> Day of Surgery    Recommendations:     Discharge recommendations: home with home health     Barriers to discharge: None    Equipment recommendations: none - pt has all necessary equipment     History:     Occupational Profile:  Living Environment: Pt lives with her disabled daughter in [facility] with ramp entry. She uses a tub/shower  Previous level of function: PTA, pt reports being Independent for ADLs and mod I for functional mobility   Equipment Owned: rolling walker, bedside commode, shower chair, raised toilet seat and HurryCane   Assistance Upon Discharge: Pt reports her other daughter will be available occasionally to provide assistance if necessary upon d/c from hospital.     Past Medical History:   Diagnosis Date    Arthritis     Carpal tunnel syndrome of right wrist 2000    Care for it here @ [facility]    CKD (chronic kidney disease) stage 3, GFR 30-59 ml/min 1/30/2018    Depression     Hypertension     Hypoactive thyroid     Muscle pain     Ptosis     Sleep apnea 2000    Care here at [facility] on CPAP       Past Surgical History:   Procedure Laterality Date    CARPAL TUNNEL RELEASE Right     CATARACT EXTRACTION Left 03/21/2017    FRACTURE SURGERY Left     arm    GANGLION CYST EXCISION Right     wrist    HEMORRHOID SURGERY      KNEE ARTHROSCOPY Right 06/09/2017    KNEE SURGERY      STAPEDECTOMY Left 05/16/2017       Subjective     Communicated with: RN prior to session.    Pt agreeable to Evaluation.    Pain/Comfort:  Pain level: 3/10 in R knee    Objective:     Pt found supine in bed with blood pressure cuff, castillo catheter, telemetry, PNC, PCEA, pulse ox, Peripheral IV and FCD.    Orthopedic Precautions: RLE weight bearing as tolerated    Occupational Performance:    Bed Mobility:    Supine to sit: CGA  Sit to supine: CGA    Transfers:    Sit<>Stand: CGA,  SW    Ambulation:    Pt ambulated 3 steps forward, backward, and side stepped to HOB with CGA and SW - no signs of LOB or SOB noted     Activities of Daily Living:  UE dressing: Maximum Assistance to josé antonio gown around back  LE dressing: Total Assistance to josé antonio socks  Pt educated on LE dressing techniques and need for AD with LE dressing      Patient left supine in bed with all lines intact and RN notified.    [facility] 6 Click: Self-care   [facility] Total Score: 16    Education:    Assessment:     Abbey Vargas is a 67 y.o. female with a medical diagnosis of <principal problem not specified>.  She presents with decreased function of R LE impeding her ability to perform ADLs and functional mobility and would benefit from OT services to maximize functional (I) and safety.    Performance deficits affecting function are weakness, impaired endurance, impaired self care skills, impaired functional mobilty, gait instability, impaired balance, decreased lower extremity function, decreased safety awareness, pain, impaired skin, decreased ROM, edema, orthopedic precautions.    Rehab Prognosis:  Good    Plan:     Patient to be seen QD to address the above listed problems via self-care/home management, therapeutic activities, therapeutic exercises    Plan of Care Reviewed with: patient    This Plan of care has been discussed with the patient who was involved in its development and understands and is in agreement with the identified goals and treatment plan    GOALS:    Occupational Therapy Goals        Problem: Occupational Therapy Goal    Goal Priority Disciplines Outcome Interventions   Occupational Therapy Goal     OT, PT/OT Ongoing (interventions implemented as appropriate)    Description:  Goals to be met by: 7 days    Patient will increase functional independence with ADLs by performing:    UE Dressing with Supervision.  LE Dressing with Supervision with AD as needed.  Grooming while standing with Supervision.  Toileting  from bedside commode with Supervision for hygiene and clothing management.   Stand pivot transfers with Supervision.  Toilet transfer to bedside commode with Supervision.                         Time Tracking:     OT Received On: 2/7/2018  OT Start Time: 1145  OT Stop Time: 1205   OT Total Time: 20 minutes     Billable Minutes:  Evaluation 12 minutes  Self Care/Home Management 8 minutes    Lyndsay Erazo, OT  2/7/2018

## 2018-02-07 NOTE — ANESTHESIA PROCEDURE NOTES
Adductor Canal Catheter    Patient location during procedure: pre-op   Block not for primary anesthetic.  Reason for block: at surgeon's request and post-op pain management   Post-op Pain Location: R knee pain  Start time: 2/7/2018 7:25 AM  Timeout: 2/7/2018 7:20 AM   End time: 2/7/2018 7:45 AM  Staffing  Anesthesiologist: MILAD HAYES  Resident/CRNA: EFRAIN CONTEH  Performed: resident/CRNA   Preanesthetic Checklist  Completed: patient identified, site marked, surgical consent, pre-op evaluation, timeout performed, IV checked, risks and benefits discussed and monitors and equipment checked  Peripheral Block  Patient position: supine  Prep: ChloraPrep and site prepped and draped  Patient monitoring: heart rate, cardiac monitor, continuous pulse ox, continuous capnometry and frequent blood pressure checks  Block type: adductor canal  Laterality: right  Injection technique: continuous  Needle  Needle type: Tuohy   Needle gauge: 17 G  Needle length: 3.5 in  Needle localization: anatomical landmarks and ultrasound guidance  Catheter type: spring wound  Catheter size: 19 G  Test dose: lidocaine 1.5% with Epi 1-to-200,000 and negative   -ultrasound image captured on disc.  Assessment  Injection assessment: negative aspiration, negative parasthesia and local visualized surrounding nerve  Paresthesia pain: none  Heart rate change: no  Slow fractionated injection: yes  Medications:  Bolus administered: 20 mL of 0.25 ropivacaine  Epinephrine added: 3.75 mcg/mL (1/300,000)  Additional Notes  VSS.  [facility] RN monitoring vitals throughout procedure.  Patient tolerated procedure well.

## 2018-02-07 NOTE — PLAN OF CARE
VSS. Patient states pain is tolerable. No N&V. Foot pumps in place throughout duration in PACU. Transferred to the next Phase of Care.

## 2018-02-07 NOTE — OP NOTE
DATE OF PROCEDURE:  02/07/2018.    SURGEON:  John Lockwood [facility] Jr., M.D.    ASSISTANT:  JULIETTE Albert    OPERATION PERFORMED:  Right total knee arthroplasty.    PREOPERATIVE DIAGNOSIS:  Osteoarthritis, right knee.    POSTOPERATIVE DIAGNOSIS:  Osteoarthritis, right knee.    COMPONENTS USED:  Omar Persona knee system.  We used a size 5 femur, right,   standard, posterior stabilized; size C tibia, right, 5-degree stem; a 13 mm   articular insert, right, posterior stabilized; vitamin E, highly cross-linked   poly; a 29 mm patella.    No Residents were available. Yarelis CURIEL scrubbed on this case and was necessary.  EBL-100cc  Tissue sent to pathology for routine examination.    OPERATIVE TECHNIQUE:  The patient was placed supine on the operating table.    Spinal anesthesia was introduced.  The right leg was prepped and draped in   sterile fashion.  The room was laminar airflow.  The patient was given   preoperative antibiotics and the OR team wore the sterile exhaust suits in order   to minimize risk for infection.  A foot pump was placed on the left foot to   help prevent DVT.  Right leg was exsanguinated.  The tourniquet was inflated to   300 pounds of pressure.  A straight anterior incision was made.  Sharp   dissection was carried down to the extensor mechanism.  The extensor mechanism   was opened in a straight Insall approach.  Partial release of the medial   collateral ligament was performed to correct some slight varus deformity.  The   intramedullary guide was placed in the femur.  Distal cut was made at 5 degrees   of valgus on a +2 setting.  The proximal tibial cut was performed removing about   4 to 5 from the medial side and about 6 to 7 from the lateral side.  I then   measured the femur for a 5 and cut it posteriorly.  It was a little tight in   flexion, so we took another 2.  At that point, we were happy with the balance,   did the notch and chamfer-plasty on the femur, sized the tibia  to a C and   drilled and prepared it with the same rotation as the femur.  The patella was   pretty thin; it was 21 and we cut it at 14.  I then Pulsavac'd and dried the   surfaces, cemented the tibial baseplate, then the femoral component, removed the   excess cement, put the 13 mm trial in, put the knee out in extension and   cemented the patella.  While the cement was hardening, we bathed the knee in the   Betadine solution and Pulsavac cleared.  Then, once the cement was hard, we put   the actual 13 insert in and closed the extensor mechanism with #1 Vicryl over   tranexamic acid solution, closed the subcutaneous tissues with 0 and 3-0 Vicryl   and the skin with 3-0 subcuticular Monocryl and a skin adhesive.  Sterile   surgical dressing was applied.  There were no complications.      HARIS  dd: 02/07/2018 10:25:41 (CST)  td: 02/07/2018 11:26:51 (CST)  Doc ID   #[id number]  Job ID #[id number]    CC:

## 2018-02-07 NOTE — NURSING TRANSFER
Nursing Transfer Note      2/7/2018     Transfer To: 557a    Transfer via stretcher    Transfer with cardiac monitoring    Transported by PCT    Medicines sent: IVF, PNC    Chart send with patient: Yes    Notified: family at bedside visit    Patient reassessed at:7224

## 2018-02-07 NOTE — ANESTHESIA PROCEDURE NOTES
IPACK Single Injection Block    Patient location during procedure: pre-op   Block not for primary anesthetic.  Reason for block: at surgeon's request and post-op pain management   Post-op Pain Location: R knee pain  Start time: 2/7/2018 7:25 AM  Timeout: 2/7/2018 7:20 AM   End time: 2/7/2018 7:45 AM  Staffing  Anesthesiologist: MILAD HAYES  Resident/CRNA: EFRAIN CONTEH  Performed: resident/CRNA   Preanesthetic Checklist  Completed: patient identified, site marked, surgical consent, pre-op evaluation, timeout performed, IV checked, risks and benefits discussed and monitors and equipment checked  Peripheral Block  Patient position: supine  Prep: ChloraPrep  Patient monitoring: heart rate, cardiac monitor, continuous pulse ox, continuous capnometry and frequent blood pressure checks  Block type: I PACK  Laterality: right  Injection technique: single shot  Needle  Needle type: Stimuplex   Needle gauge: 21 G  Needle length: 4 in  Needle localization: anatomical landmarks and ultrasound guidance   -ultrasound image captured on disc.  Assessment  Injection assessment: negative aspiration, negative parasthesia and local visualized surrounding nerve  Paresthesia pain: none  Heart rate change: no  Slow fractionated injection: yes  Medications:  Bolus administered: 20 mL of 0.25 ropivacaine  Epinephrine added: 3.75 mcg/mL (1/300,000)  Additional Notes  VSS.  [facility] RN monitoring vitals throughout procedure.  Patient tolerated procedure well.

## 2018-02-07 NOTE — ANESTHESIA POST-OP PAIN MANAGEMENT
Pt re-evaluated at bedside in PACU. Complaining of 7/10 anterior and posterior knee pain. Pain initially treated with oxycodone, fentanyl, and PNC bolus x 2. Pt with some improvement. Deficit evaluated and not as desired in the saphenous distribution. Ultrasound utilized to evaluate PNC placement. Upon injection of saline, catheter appeared not ideally placed in adductor canal and medial to adductor canal. Catheter pulled back approx 10 cm and more ideally placed in adductor canal. 10 cc of 2% lidocaine administered. PNC ropiv infusion restarted. Will continue to assess.     Shun Almonte MD   Anesthesiology Resident PGY 4

## 2018-02-07 NOTE — H&P (VIEW-ONLY)
CC: Right knee pain    Abbey Vargas is a 67 y.o. female with 9 month history of Right knee pain. Pain is worse with activity and weight bearing.  Patient has experienced interference of activities of daily living due to decreased range of motion and an increase in joint pain and swelling.  Patient has failed non-operative treatment including NSAIDs, corticosteroid injections, viscosupplement injections, and activity modification.  Abbey Vargas currently ambulates using assistive device. She had arthroscopy of right knee at an outside facility in June of 2017.    Past Medical History:   Diagnosis Date    Arthritis     Carpal tunnel syndrome of right wrist 2000    Care for it here @ [facility]    CKD (chronic kidney disease) stage 3, GFR 30-59 ml/min 1/30/2018    Depression     Hypertension     Hypoactive thyroid     Muscle pain     Ptosis     Sleep apnea 2000    Care here at [facility] on CPAP       Past Surgical History:   Procedure Laterality Date    CARPAL TUNNEL RELEASE Right     CATARACT EXTRACTION Left 03/21/2017    FRACTURE SURGERY Left     arm    GANGLION CYST EXCISION Right     wrist    HEMORRHOID SURGERY      KNEE ARTHROSCOPY Right 06/09/2017    KNEE SURGERY      STAPEDECTOMY Left 05/16/2017       Family History   Problem Relation Age of Onset    Arthritis Mother     Scleroderma Mother     Cancer Mother     Hypertension Mother     Glaucoma Mother     Cataracts Mother     Breast cancer Mother     Cancer Father      colon    Breast cancer Maternal Grandmother        Review of patient's allergies indicates:  No Known Allergies      Current Outpatient Prescriptions:     amitriptyline (ELAVIL) 50 MG tablet, Take 1 tablet (50 mg total) by mouth every evening., Disp: 90 tablet, Rfl: 3    aspirin (ECOTRIN) 81 MG EC tablet, Take 81 mg by mouth every evening. , Disp: , Rfl:     BIOTIN ORAL, Take 1 capsule by mouth once daily. , Disp: , Rfl:     calcium-vitamin D3 (CALCIUM 500 + D) 500  "mg(1,250mg) -200 unit per tablet, Take 1 tablet by mouth 2 (two) times daily with meals., Disp: , Rfl:     cyclobenzaprine (FLEXERIL) 5 MG tablet, Take 5 mg by mouth 3 (three) times daily as needed., Disp: , Rfl: 1    diclofenac sodium (VOLTAREN) 1 % Gel, Apply 2 g topically 3 (three) times daily., Disp: 2 g, Rfl: 3    DOCUSATE SODIUM (STOOL SOFTENER ORAL), Take by mouth once daily., Disp: , Rfl:     doxepin (ZONALON) 5 % cream, , Disp: , Rfl:     fish oil-omega-3 fatty acids 300-1,000 mg capsule, Take 2 g by mouth once daily., Disp: , Rfl:     levothyroxine (SYNTHROID) 100 MCG tablet, TAKE 1 TABLET BY MOUTH EVERY DAY, Disp: 90 tablet, Rfl: 3    lidocaine-prilocaine (EMLA) cream, Apply topically as needed., Disp: , Rfl:     lisinopril 10 MG tablet, Take 1 tablet (10 mg total) by mouth every evening., Disp: 90 tablet, Rfl: 3    meloxicam (MOBIC) 7.5 MG tablet, Take 1 tablet (7.5 mg total) by mouth every evening., Disp: 90 tablet, Rfl: 3    multivitamin (THERAGRAN) per tablet, Take 1 tablet by mouth once daily., Disp: , Rfl:     pravastatin (PRAVACHOL) 80 MG tablet, Take 1 tablet (80 mg total) by mouth once daily. (Patient taking differently: Take 80 mg by mouth every evening. ), Disp: 90 tablet, Rfl: 3    Review of Systems:  Constitutional: no fever or chills  Eyes: no visual changes  ENT: no nasal congestion or sore throat  Respiratory: no cough or shortness of breath  Cardiovascular: no chest pain or palpitations  Gastrointestinal: no nausea or vomiting, tolerating diet  Genitourinary: no hematuria or dysuria  Integument/Breast: no rash or pruritis  Hematologic/Lymphatic: no easy bruising or lymphadenopathy  Musculoskeletal: positive for right knee  Neurological: no seizures or tremors  Behavioral/Psych: no auditory or visual hallucinations  Endocrine: no heat or cold intolerance    PE:  Ht 5' 4" (1.626 m)   Wt 81 kg (178 lb 9.2 oz)   BMI 30.65 kg/m²   General: Pleasant, cooperative, NAD   Gait: " antalgic  HEENT: NCAT, sclera nonicteric   Lungs: Respirations clear bilaterally; equal and unlabored.   CV: S1S2; 2+ bilateral upper and lower extremity pulses.   Skin: Intact throughout with no rashes, erythema, or lesions  Extremities: No LE edema,  no erythema or warmth of the skin in either lower extremity.    Right knee exam:  Knee Range of Motion:5-110 active, pain with passive range of motion  Effusion:none  Condition of skin:intact  Location of tenderness:Medial joint line   Strength:5 of 5 quadriceps strength and 5 of 5 hamstring strength  Stability: stable to testing    Hip Examination:full painless range of motion, without tenderness    Radiographs: Radiographs reveal advanced degenerative changes including subchondral cyst formation, subchondral sclerosis, osteophyte formation, joint space narrowing.     Knee Alignment:  Significiant varus    Diagnosis: osteoarthritis Right knee    Plan: Right total knee arthroplasty    Due to the serious nature of total joint infection and the high prevalence of community acquired MRSA, vancomycin will be used perioperatively.

## 2018-02-07 NOTE — OPERATIVE NOTE ADDENDUM
Certification of Assistant at Surgery       Surgery Date: 2/7/2018     Participating Surgeons:  Surgeon(s) and Role:     * John L. [facility] Jr., MD - Primary    Procedures:  Procedure(s) (LRB):  REPLACEMENT-KNEE-TOTAL (Right)    Assistant Surgeon's Certification of Necessity:  I understand that section 1842 (b) (6) (d) of the Social Security Act generally prohibits Medicare Part B reasonable charge payment for the services of assistants at surgery in teaching hospitals when qualified residents are available to furnish such services. I certify that the services for which payment is claimed were medically necessary, and that no qualified resident was available to perform the services. I further understand that these services are subject to post-payment review by the Medicare carrier.      Yarelis Aguilar PA-C    02/07/2018  10:18 AM

## 2018-02-07 NOTE — PT/OT/SLP EVAL
Physical Therapy Evaluation    Patient Name:  Abbey Vargas   MRN:  [id number]    Recommendations:     Discharge recommendations: Home Health PT  Barriers to discharge: None  Equipment recommendations: none    Assessment:     Abbey Vargas is a 67 y.o. female admitted with a medical diagnosis of R TKA.  She presents with the below impairments/functional limitations.  Pt tolerated evaluation well today and is motivated to do well with recent joint replacement.  Pt is a good candidate for skilled PT services to address the below deficits and to increase functional independence.      Rehab Prognosis: good; patient would benefit from acute skilled PT services to address these deficits and reach maximum level of function.      Rehab Identified impairments/functional limitations:   weakness, impaired endurance, impaired sensation, impaired self care skills, impaired functional mobilty, gait instability, impaired balance, decreased coordination, decreased lower extremity function, decreased safety awareness, pain, decreased ROM, impaired skin, edema, orthopedic precautions    Recent Surgery: Procedure(s) (LRB):  REPLACEMENT-KNEE-TOTAL (Right) Day of Surgery    Plan:     During this hospitalization, patient to be seen BID to address the above listed problems via gait training, therapeutic activity, therapeutic exercise, and neuromuscular re-education   Plan of Care Reviewed with: patient    Subjective     Communicated with RN prior to session.  Patient found supine upon PT entry, agreeable to evaluation.      Chief Complaint: none  Patient comments/goals: to return home safely    Pain/Comfort:  Pain level prior: 2/10 in R knee  Pain level post: 2/10 in R knee    Patients cultural, spiritual, Rastafari conflicts given the current situation: none stated    Living Environment:  Pt lives with her disabled daughter in [facility] with ramp entry. She uses a tub/shower  Previous level of function: PTA, pt reports being  Independent for ADLs and mod I for functional mobility   Equipment Owned: rolling walker, bedside commode, shower chair, raised toilet seat and HurryCane   Assistance Upon Discharge: Pt reports her other daughter will be available occasionally to provide assistance if necessary upon d/c from hospital.      Objective:     Patient found supine in bed with blood pressure cuff, telemetry, perineural catheter, peripheral IV, pulse ox, and FCD.     General Precautions: Standard, fall  Orthopedic Precautions:  RLE weight bearing as tolerated  Braces: none     Physical Exam:  Postural examination/scapula alignment: WFL    Skin integrity: Visible skin intact  Edema: Mild RLE    Sensation:   Intact    Lower Extremity Range of Motion:  Right Lower Extremity: WFL except knee  Left Lower Extremity: WFL     Lower Extremity Strength:  Right Lower Extremity: WFL except knee  Left Lower Extremity: WFL     Functional Mobility:    Bed Mobility:    Supine to sit: Min A   Sit to supine: Mod A    Transfers:    Sit<>Stand: Mod A with SW    Ambulation:    Pt ambulated 3 steps forward and backward and 1-2 sidesteps with SW and Mod A.  Pt educated on 3 point gait pattern.  Pt able to verbalize and demonstrate understanding.       AM-PAC 6 CLICK MOBILITY  Total Score: 18     Therapeutic Activities and Exercises:  PT evaluation performed.  Pt educated on role of PT, safety with functional mobility.     Patient left supine in bed with all lines intact and RN notified.    GOALS:    Physical Therapy Goals        Problem: Physical Therapy Goal    Goal Priority Disciplines Outcome Goal Variances Interventions   Physical Therapy Goal     PT/OT, PT Ongoing (interventions implemented as appropriate)     Description:  Goals to be met by: 18     Patient will increase functional independence with mobility by performin. Supine to sit with Supervision  2. Sit to supine with Supervision  3. Sit to stand transfer with Stand-by Assistance  4. Gait   x 150 feet with Stand-by Assistance using Rolling Walker.   5. Lower extremity exercise program x 30 reps per handout, with independence                      History:     Past Medical History:   Diagnosis Date    Arthritis     Carpal tunnel syndrome of right wrist 2000    Care for it here @ [facility]    CKD (chronic kidney disease) stage 3, GFR 30-59 ml/min 1/30/2018    Depression     Hypertension     Hypoactive thyroid     Muscle pain     Ptosis     Sleep apnea 2000    Care here at [facility] on CPAP       Past Surgical History:   Procedure Laterality Date    CARPAL TUNNEL RELEASE Right     CATARACT EXTRACTION Left 03/21/2017    FRACTURE SURGERY Left     arm    GANGLION CYST EXCISION Right     wrist    HEMORRHOID SURGERY      KNEE ARTHROSCOPY Right 06/09/2017    KNEE SURGERY      STAPEDECTOMY Left 05/16/2017       Time Tracking:     PT Received On: 02/07/18  PT Start Time: 1145     PT Stop Time: 1205  PT Total Time (min): 20 min     Billable Minutes: Evaluation 12; Therapeutic Activity 8      Carlos Morales, PT, DPT  2/7/2018   [phone]

## 2018-02-07 NOTE — TRANSFER OF CARE
"Anesthesia Transfer of Care Note    Patient: Abbey Vargas    Procedure(s) Performed: Procedure(s) (LRB):  REPLACEMENT-KNEE-TOTAL (Right)    Patient location: PACU    Anesthesia Type: general    Transport from OR: Transported from OR on 6-10 L/min O2 by face mask with adequate spontaneous ventilation    Post pain: adequate analgesia    Post assessment: no apparent anesthetic complications    Post vital signs: stable    Level of consciousness: awake, oriented and alert    Nausea/Vomiting: no nausea/vomiting    Complications: none          Last vitals:   Visit Vitals  /66 (BP Location: Right arm, Patient Position: Lying)   Pulse 81   Temp 36.4 °C (97.5 °F) (Temporal)   Resp 17   Ht 5' 4" (1.626 m)   Wt 80.7 kg (178 lb)   SpO2 100%   BMI 30.55 kg/m²     "

## 2018-02-07 NOTE — ANESTHESIA PROCEDURE NOTES
Spinal    Diagnosis: right knee arthritis  Patient location during procedure: OR  Start time: 2/7/2018 8:10 AM  Timeout: 2/7/2018 8:10 AM  End time: 2/7/2018 8:22 AM  Staffing  Anesthesiologist: SUDHIR RANDHAWA  Resident/CRNA: DANISHA MORIN  Performed: resident/CRNA   Preanesthetic Checklist  Completed: patient identified, site marked, surgical consent, pre-op evaluation, timeout performed, IV checked, risks and benefits discussed and monitors and equipment checked  Spinal Block  Patient position: sitting  Prep: ChloraPrep  Patient monitoring: cardiac monitor, continuous pulse ox and heart rate  Approach: midline  Location: L3-4  Injection technique: single shot  CSF Fluid: clear free-flowing CSF  Needle  Needle type: Marya   Needle gauge: 25 G  Needle length: 3.5 in  Additional Documentation: incremental injection  Needle localization: anatomical landmarks  Assessment  Patient's tolerance of the procedure: no complaints  Medications:  Bolus administered: 2.4 mL of 2.0 mepivacaine

## 2018-02-08 VITALS
DIASTOLIC BLOOD PRESSURE: 64 MMHG | HEART RATE: 83 BPM | WEIGHT: 178 LBS | RESPIRATION RATE: 18 BRPM | SYSTOLIC BLOOD PRESSURE: 120 MMHG | OXYGEN SATURATION: 97 % | HEIGHT: 64 IN | TEMPERATURE: 98 F | BODY MASS INDEX: 30.39 KG/M2

## 2018-02-08 PROCEDURE — 63600175 PHARM REV CODE 636 W HCPCS: Performed by: PHYSICIAN ASSISTANT

## 2018-02-08 PROCEDURE — 99231 SBSQ HOSP IP/OBS SF/LOW 25: CPT | Mod: ,,, | Performed by: ANESTHESIOLOGY

## 2018-02-08 PROCEDURE — 97530 THERAPEUTIC ACTIVITIES: CPT

## 2018-02-08 PROCEDURE — 97110 THERAPEUTIC EXERCISES: CPT

## 2018-02-08 PROCEDURE — 99900035 HC TECH TIME PER 15 MIN (STAT)

## 2018-02-08 PROCEDURE — 97116 GAIT TRAINING THERAPY: CPT

## 2018-02-08 PROCEDURE — 25000003 PHARM REV CODE 250: Performed by: STUDENT IN AN ORGANIZED HEALTH CARE EDUCATION/TRAINING PROGRAM

## 2018-02-08 PROCEDURE — 97535 SELF CARE MNGMENT TRAINING: CPT

## 2018-02-08 PROCEDURE — 25000003 PHARM REV CODE 250: Performed by: PHYSICIAN ASSISTANT

## 2018-02-08 RX ADMIN — POLYETHYLENE GLYCOL 3350 17 G: 17 POWDER, FOR SOLUTION ORAL at 10:02

## 2018-02-08 RX ADMIN — CALCIUM 1000 MG: 500 TABLET ORAL at 12:02

## 2018-02-08 RX ADMIN — STANDARDIZED SENNA CONCENTRATE AND DOCUSATE SODIUM 1 TABLET: 8.6; 5 TABLET, FILM COATED ORAL at 10:02

## 2018-02-08 RX ADMIN — ACETAMINOPHEN 1000 MG: 500 TABLET ORAL at 06:02

## 2018-02-08 RX ADMIN — ACETAMINOPHEN 1000 MG: 500 TABLET ORAL at 12:02

## 2018-02-08 RX ADMIN — OXYCODONE HYDROCHLORIDE 10 MG: 5 TABLET ORAL at 06:02

## 2018-02-08 RX ADMIN — OXYCODONE HYDROCHLORIDE 10 MG: 5 TABLET ORAL at 12:02

## 2018-02-08 RX ADMIN — Medication: at 11:02

## 2018-02-08 RX ADMIN — ROPIVACAINE HYDROCHLORIDE 8 ML/HR: 2 INJECTION, SOLUTION EPIDURAL; INFILTRATION at 06:02

## 2018-02-08 RX ADMIN — OXYCODONE HYDROCHLORIDE 10 MG: 5 TABLET ORAL at 02:02

## 2018-02-08 RX ADMIN — ASPIRIN 325 MG: 325 TABLET, DELAYED RELEASE ORAL at 10:02

## 2018-02-08 RX ADMIN — CALCIUM 1000 MG: 500 TABLET ORAL at 10:02

## 2018-02-08 NOTE — PLAN OF CARE
Problem: Physical Therapy Goal  Goal: Physical Therapy Goal  Goals to be met by: 18     Patient will increase functional independence with mobility by performin. Supine to sit with Supervision- Met 18  2. Sit to supine with Supervision- Met 18  3. Sit to stand transfer with Stand-by Assistance- Met 18  4. Gait  x 150 feet with Stand-by Assistance using Rolling Walker. - Met 18  5. Lower extremity exercise program x 30 reps per handout, with independence- Met 18     Outcome: Outcome(s) achieved Date Met: 18  Pt with very good tolerance to session, with no increased pain or complications. Able to amb 182ft and SBA with no LOB or instability. Pt has been educated on safety in the home environment and has verbalized understanding. Pt has maximized functional outcomes in the acute care environment and fall risk has been minimized. Pt will not require second BID session and is appropriate for d/c with HHPT.

## 2018-02-08 NOTE — PLAN OF CARE
POD 1 s/p right TKA. PT/OT ordered to eval and treat. PT/OT recommended home health and no additional DME needs. Patient currently lives with her disabled daughter. CM completed discharge assessment and planning. SW and CM will continue to follow for any additional needs. Plan A to discharge home with home health as soon as medically stable. Plan B to discharge home with family support and plans for outpatient rehab.    Patient states she owns a rolling walker, bedside commode, shower chair, raised toilet seat and HurryCane.    PCP: Ama Alvarado NP    Pharmacy:   [facility] [place]  [place] - [address]  [address]  [place] [address]  Phone: [phone] Fax: [phone]    Payor: [employer] MEDICARE / Plan: [employer] HEALTH / Product Type: Medicare Advantage /      02/08/18 1000   Discharge Assessment   Assessment Type Discharge Planning Assessment   Confirmed/corrected address and phone number on facesheet? Yes   Assessment information obtained from? Patient;Medical Record   Expected Length of Stay (days) 2   Communicated expected length of stay with patient/caregiver yes   Prior to hospitilization cognitive status: Alert/Oriented   Prior to hospitalization functional status: Assistive Equipment   Current cognitive status: Alert/Oriented   Current Functional Status: Assistive Equipment   Lives With child(aditi), adult  (daughter)   Able to Return to Prior Arrangements yes   Is patient able to care for self after discharge? Yes   Who are your caregiver(s) and their phone number(s)? daughter- Rachelle Fair [phone], [phone]   Patient's perception of discharge disposition home health   Readmission Within The Last 30 Days no previous admission in last 30 days   Patient currently being followed by outpatient case management? No   Patient currently receives any other outside agency services? No   Equipment Currently Used at Home bedside commode;walker, rolling;cane,  quad;raised toilet;shower chair   Do you have any problems affording any of your prescribed medications? No   Is the patient taking medications as prescribed? yes   Does the patient have transportation home? Yes   Transportation Available family or friend will provide   Does the patient receive services at the [facility]in [facility]? No   Discharge Plan A Home Health;Home with family   Discharge Plan B Home with family;Other  (outpatient rehab)   Patient/Family In Agreement With Plan yes

## 2018-02-08 NOTE — PROGRESS NOTES
[facility]  Orthopedics  Progress Note    Patient Name: Abbey Vargas  MRN: [id number]  Admission Date: 2/7/2018  Hospital Length of Stay: 1 days  Attending Provider: [facility] Jr., MD  Primary Care Provider: Ama Alvarado NP  Follow-up For: Procedure(s) (LRB):  REPLACEMENT-KNEE-TOTAL (Right)    Post-Operative Day: 1 Day Post-Op  Subjective:     Principal Problem:Primary osteoarthritis of right knee    Principal Orthopedic Problem: same    Interval History: Patient seen and examined at bedside.  No acute events overnight.  Pain controlled.  Walked with PT yesterday.    Review of patient's allergies indicates:  No Known Allergies    Current Facility-Administered Medications   Medication    0.9%  NaCl infusion    acetaminophen tablet 1,000 mg    amitriptyline tablet 50 mg    aspirin EC tablet 325 mg    bisacodyl suppository 10 mg    calcium carbonate tablet 1,000 mg    ceFAZolin injection 2 g    famotidine tablet 20 mg    levothyroxine tablet 100 mcg    morphine injection 2 mg    morphine injection 2 mg    naloxone 0.4 mg/mL injection 0.02 mg    ondansetron disintegrating tablet 4 mg    ondansetron injection 4 mg    oxyCODONE immediate release tablet 10 mg    oxyCODONE immediate release tablet 5 mg    polyethylene glycol packet 17 g    pravastatin tablet 80 mg    pregabalin capsule 75 mg    ramelteon tablet 8 mg    ropivacaine (PF) 2 mg/ml (0.2%) infusion    ropivacaine 0.2% ON-Q C-BLOC 400 ML (SELECT A FLOW)    senna-docusate 8.6-50 mg per tablet 1 tablet    sodium chloride 0.9% flush 3 mL     Objective:     Vital Signs (Most Recent):  Temp: 98.7 °F (37.1 °C) (02/08/18 0306)  Pulse: 80 (02/08/18 0306)  Resp: 20 (02/08/18 0306)  BP: (!) 165/85 (02/08/18 0306)  SpO2: 99 % (02/08/18 0306) Vital Signs (24h Range):  Temp:  [96.4 °F (35.8 °C)-98.7 °F (37.1 °C)] 98.7 °F (37.1 °C)  Pulse:  [49-85] 80  Resp:  [11-27] 20  SpO2:  [91 %-100 %] 99 %  BP: ()/(57-98)  "165/85     Weight: 80.7 kg (178 lb)  Height: 5' 4" (162.6 cm)  Body mass index is 30.55 kg/m².      Intake/Output Summary (Last 24 hours) at 02/08/18 0611  Last data filed at 02/07/18 1345   Gross per 24 hour   Intake           2497.5 ml   Output               50 ml   Net           2447.5 ml       Ortho/SPM Exam  AAOx4  NAD  RRR  No increased WOB  Aquacel c/d/i  Polar ice in place  SILT and motor intact T/SP/DP  WWP extremities  FCDs in place and functioning    Significant Labs: All pertinent labs within the past 24 hours have been reviewed.    Significant Imaging: I have reviewed all pertinent imaging results/findings.    Assessment/Plan:     * Primary osteoarthritis of right knee    67 y.o. female POD1 s/p R TKA    Pain control  PT/OT: WBAT RLE  DVT PPx:  BID, FCDs at all times when not ambulating  Abx: postop Ancef  Labs: reviewed  Drain: none  Galdamez: none    Dispo: f/u PT recs          PONV (postoperative nausea and vomiting)    antiemetics        RAULITO (obstructive sleep apnea)    cpap        Essential hypertension    Home meds        Hyperlipidemia    Home meds        Thyroid disorder    Home meds              Trevon Sotelo MD  Orthopedics  [facility]  "

## 2018-02-08 NOTE — ADDENDUM NOTE
Addendum  created 02/08/18 1152 by Rashawn Carvajal MD    Charge Capture section accepted, Sign clinical note

## 2018-02-08 NOTE — PROGRESS NOTES
Pt and family present, consent to converting adductor PNC to On Q.  Site CDI.  Educated regarding continued pain management, fall risk, signs of complications, continued monitoring, as well as discontinuing catheter on 2/10.  Two numbers obtained for APS to follow up.  Understanding verbalized.

## 2018-02-08 NOTE — ANESTHESIA POST-OP PAIN MANAGEMENT
Acute Pain Service and Perioperative Surgical Home Progress Note    HPI  Abbey Vargas is a 67 y.o., female,     Interval history  No acute events overnight. Pain improved following PNC re-evaluation in PACU. Pain remains well controlled. Working with PT this AM.     Surgery:  Procedure(s) (LRB):  REPLACEMENT-KNEE-TOTAL (Right)    Post Op Day #: 1    Catheter type: Perineural Adductor Canal    Infusion type: Ropivacaine 0.2%  8 ml/hr basal    Problem List:    Active Hospital Problems    Diagnosis  POA    *Primary osteoarthritis of right knee [M17.11]  Yes    PONV (postoperative nausea and vomiting) [R11.2, Z98.890]  Yes    Thyroid disorder [E07.9]  Yes    Hyperlipidemia [E78.5]  Yes    Essential hypertension [I10]  Yes    RAULITO (obstructive sleep apnea) [G47.33]  Yes      Resolved Hospital Problems    Diagnosis Date Resolved POA   No resolved problems to display.       Subjective:       General appearance of alert, oriented, no complaints   Pain with rest: 5    Numbers   Pain with movement: 7    Numbers   Side Effects    1. Pruritis No    2. Nausea No    3. Motor Blockade No, 0=Ability to raise lower extremities off bed    4. Sedation No, 1=awake and alert    Schedule Medications:    acetaminophen  1,000 mg Oral Q6H    amitriptyline  50 mg Oral QHS    aspirin  325 mg Oral BID    calcium carbonate  1,000 mg Oral TID WM    ceFAZolin (ANCEF) IVPB  2 g Intravenous Q8H    famotidine  20 mg Oral BID    levothyroxine  100 mcg Oral QHS    ondansetron  4 mg Oral Once    polyethylene glycol  17 g Oral Daily    pravastatin  80 mg Oral QHS    pregabalin  75 mg Oral QHS    senna-docusate 8.6-50 mg  1 tablet Oral BID        Continuous Infusions:   sodium chloride 0.9% 150 mL/hr at 02/07/18 1026    ropivacaine (PF) 2 mg/ml (0.2%) 8 mL/hr (02/07/18 1025)    ropivacaine          PRN Medications:  bisacodyl, morphine, morphine, naloxone, ondansetron, oxyCODONE, oxyCODONE, ramelteon, ropivacaine, sodium  chloride 0.9%       Antibiotics:  Antibiotics     Start     Stop Route Frequency Ordered    02/07/18 1700  ceFAZolin injection 2 g      02/08 0859 IV Every 8 hours (non-standard times) 02/07/18 1009             Objective:     Catheter site clean, dry, intact          Vital Signs (Most Recent):  Temp: 37.1 °C (98.7 °F) (02/08/18 0306)  Pulse: 80 (02/08/18 0306)  Resp: 20 (02/08/18 0306)  BP: (!) 165/85 (02/08/18 0306)  SpO2: 99 % (02/08/18 0306) Vital Signs Range (Last 24H):  Temp:  [35.8 °C (96.4 °F)-37.1 °C (98.7 °F)]   Pulse:  [49-85]   Resp:  [11-27]   BP: ()/(57-98)   SpO2:  [91 %-100 %]          I & O (Last 24H):  Intake/Output Summary (Last 24 hours) at 02/08/18 0631  Last data filed at 02/07/18 1345   Gross per 24 hour   Intake           2497.5 ml   Output               50 ml   Net           2447.5 ml       Physical Exam:    GA: Alert, comfortable, no acute distress.   Pulmonary: Clear to auscultation A/P/L. No wheezing, crackles, or rhonchi.  Cardiac: RRR S1 & S2 w/o rubs/murmurs/gallops.   Abdominal:Bowel sounds present. No tenderness to palpation or distension. No appreciable hepatosplenomegaly.   Skin: No jaundice, rashes, or visible lesions.         Laboratory:  CBC: No results for input(s): WBC, RBC, HGB, HCT, PLT, MCV, MCH, MCHC in the last 72 hours.    BMP: Recent Labs      02/07/18   1027   NA  142   K  4.4   CO2  27   CL  109   BUN  14   CREATININE  0.8   GLU  102   CALCIUM  8.4*       No results for input(s): PT, INR, PROTIME, APTT in the last 72 hours.      Anticoagulant dose Aspirin 325 mg BID     Assessment:     Pain control adequate    Plan:     1) Pain: Adductor canal perineural catheter in place and infusing. Good level. Multimodal pain regimen with acetaminophen, Lyrica, and prn oxycodone given. Holding celebrex 2/2 CKD. Will continue to monitor. Plan to D/C perineural catheter in AM or home with On-Q if patient discharged today.   2) Depression: Continue home meds    3)  Hypothyroidism: Continue home meds    4) HLD: Continue home meds (statin)    5) HTN: Holding home ACE-I. BP improved this AM.    6) FEN/GI: Tolerating liquids, advance diet as tolerated.    7) Dispo: Pt working well with PT/OT. Case management and SW for placement. Plan for D/C tomorrow morning or possibly today if patient works well with PT and meets goals.    Evaluator Shun Vargas MD        Pt seen and examined with Dr. Vargas.  Note reviewed.  Agree with assessment and plan.      Rashawn Carvajal M.D.  Attending Anesthesiologist

## 2018-02-08 NOTE — PLAN OF CARE
[facility]    HOME HEALTH ORDERS  FACE TO FACE ENCOUNTER    Patient Name: Abbey Vargas  YOB: 1950    PCP: Ama Alvarado NP   PCP Address: [address] / [place] [address]  PCP Phone Number: [phone]  PCP Fax: [phone]    Encounter Date: 02/08/2018    Admit to Home Health    Diagnoses:  Active Hospital Problems    Diagnosis  POA    *Primary osteoarthritis of right knee [M17.11]  Yes    PONV (postoperative nausea and vomiting) [R11.2, Z98.890]  Yes    Thyroid disorder [E07.9]  Yes    Hyperlipidemia [E78.5]  Yes    Essential hypertension [I10]  Yes    RAULITO (obstructive sleep apnea) [G47.33]  Yes      Resolved Hospital Problems    Diagnosis Date Resolved POA   No resolved problems to display.       Future Appointments  Date Time Provider Department Center   2/20/2018 10:45 AM Yarelis Aguilar PA-C [facility] ORTHO [place]   3/7/2018 10:40 AM Ama Alvarado NP [facility] FAM MED [facility]   3/21/2018 1:15 PM MAITE, PMR RESIDENT [facility] PHYSMED [facility] ACC   4/19/2018 8:30 AM Roberto Nichols MD [facility] RHEUM [facility]   4/19/2018 9:15 AM Fantasma Chavez MD [facility] PULM [facility] ACT   6/4/2018 10:00 AM OPHTHALMOLOGY GENERAL, MAITE [facility] OPHTHAL [facility] GREEN     Follow-up Information     Yarelis Aguilar PA-C In 2 weeks.    Specialty:  Orthopedic Surgery  Contact information:  [address] [place]  [facility] [id number] [phone]                     I have seen and examined this patient face to face today. My clinical findings that support the need for the home health skilled services and home bound status are the following:  Weakness/numbness causing balance and gait disturbance due to Joint Replacement making it taxing to leave home.    Allergies:Review of patient's allergies indicates:  No Known Allergies    Diet: regular diet    Activities: activity as tolerated    Home Health Admitting Clinician:   SN/PT to complete comprehensive assessment including routine vital signs. Instruct on  disease process and s/s of complications to report to MD. Follow specific home health arthoplasty protocol. Review/verify medication list sent home with the patient at time of discharge  and instruct patient/caregiver as needed. If coumadin ordered, coumadin clinic to manage INR with INR draws 2x per week with a goal to maintain INR between 1.8 and 2.2. Frequency may be adjusted depending on start of care date.    Notify MD if SBP > 160 or < 90; DBP > 90 or < 50; HR > 120 or < 50; Temp > 101    Home Medical Equipment:  Walker, 3-1 bedside commode, transfer tub bench    CONSULTS:    Physical Therapy may admit if patient not on coumadin, PT to perform comprehensive assessment if performing admit visit and generate therapy plan of care. Evaluate for home safety and equipment needs; Establish/upgrade home exercise program. Perform/instruct on therapeutic exercises, gait training, transfer training, and Range of Motion.        MISCELLANEOUS CARE:  Routine Skin for Bedridden Patients: Instruct patient/caregiver to apply moisture barrier cream to all skin folds and wet areas in perineal area daily and after baths and all bowel movements.    WOUND CARE ORDERS:  Assess Surgical Incision/DSRG each TX  Aquacel AG drsg applied post-op leave on 14 days post op. Call MD if any drainage reaches border to border of drsg horizontally, s/s of infection, temp >101, induration, swelling or redness.  If dressing is removed per MD order, then apply island dressing, change/teach caregiver to perform daily dressing change if island dressing present.    Medications: Review discharge medications with patient and family and provide education.      Current Discharge Medication List      START taking these medications    Details   aspirin 325 MG tablet Take 1 tablet (325 mg total) by mouth 2 (two) times daily.  Qty: 60 tablet, Refills: 0      ondansetron (ZOFRAN-ODT) 8 MG TbDL Take 1 tablet (8 mg total) by mouth every 12 (twelve) hours as  needed (nausea).  Qty: 20 tablet, Refills: 0      oxyCODONE-acetaminophen (PERCOCET) 5-325 mg per tablet Take 1-2 tablets by mouth every 4 to 6 hours as needed for Pain.  Qty: 90 tablet, Refills: 0         CONTINUE these medications which have CHANGED    Details   docusate sodium (COLACE) 100 MG capsule Take 1 capsule (100 mg total) by mouth 2 (two) times daily as needed for Constipation.  Qty: 60 capsule, Refills: 0         CONTINUE these medications which have NOT CHANGED    Details   amitriptyline (ELAVIL) 50 MG tablet Take 1 tablet (50 mg total) by mouth every evening.  Qty: 90 tablet, Refills: 3      levothyroxine (SYNTHROID) 100 MCG tablet TAKE 1 TABLET BY MOUTH EVERY DAY  Qty: 90 tablet, Refills: 3      lisinopril 10 MG tablet Take 1 tablet (10 mg total) by mouth every evening.  Qty: 90 tablet, Refills: 3    Comments: This prescription was filled on 7/15/2017. Any refills authorized will be placed on file.  Associated Diagnoses: Essential hypertension      pravastatin (PRAVACHOL) 80 MG tablet Take 1 tablet (80 mg total) by mouth once daily.  Qty: 90 tablet, Refills: 3    Associated Diagnoses: Hyperlipidemia, unspecified hyperlipidemia type      BIOTIN ORAL Take 1 capsule by mouth once daily.       calcium-vitamin D3 (CALCIUM 500 + D) 500 mg(1,250mg) -200 unit per tablet Take 1 tablet by mouth 2 (two) times daily with meals.      doxepin (ZONALON) 5 % cream       fish oil-omega-3 fatty acids 300-1,000 mg capsule Take 2 g by mouth once daily.      multivitamin (THERAGRAN) per tablet Take 1 tablet by mouth once daily.         STOP taking these medications       aspirin (ECOTRIN) 81 MG EC tablet Comments:   Reason for Stopping:         cyclobenzaprine (FLEXERIL) 5 MG tablet Comments:   Reason for Stopping:         diclofenac sodium (VOLTAREN) 1 % Gel Comments:   Reason for Stopping:         lidocaine-prilocaine (EMLA) cream Comments:   Reason for Stopping:         meloxicam (MOBIC) 7.5 MG tablet Comments:    Reason for Stopping:               I certify that this patient is confined to her home and needs intermittent skilled nursing care, physical therapy and occupational therapy.

## 2018-02-08 NOTE — PT/OT/SLP PROGRESS
Occupational Therapy   Treatment    Name: Abbey Vargas  MRN: [id number]  Admitting Diagnosis:  Primary osteoarthritis of right knee  1 Day Post-Op    Recommendations:     Discharge Recommendations: home with home health  Discharge Equipment Recommendations:  none  Barriers to discharge:       Subjective     Communicated with: RN prior to session.  Pain/Comfort:  · Pain Rating 1: 4/10  · Location - Side 1: Right  · Location - Orientation 1: generalized  · Location 1: knee  · Pain Addressed 1: Pre-medicate for activity, Reposition, Cessation of Activity, Nurse notified  · Pain Rating Post-Intervention 1: 0/10    Patients cultural, spiritual, Jehovah's witness conflicts given the current situation: none    Objective:     Patient found with: perineural catheter, FCD, telemetry (polar ice)    General Precautions: Standard, fall   Orthopedic Precautions:RLE weight bearing as tolerated   Braces: N/A     Occupational Performance:    Bed Mobility:    · Patient completed Rolling/Turning to Left with  stand by assistance  · Patient completed Supine to Sit with stand by assistance     Functional Mobility/Transfers:  · Patient completed Sit <> Stand Transfer with stand by assistance  with  rolling walker   · Patient completed Bed <> Chair Transfer using Stand Pivot technique with stand by assistance with rolling walker  · Patient completed Toilet Transfer Stand Pivot technique with stand by assistance with  rolling walker  · Functional Mobility: Pt performed functional mobility for short household distances with SBA using RW to complete functional tasks     Activities of Daily Living:  · Grooming: supervision and stand by assistance to wash face and hand hygiene with SBA standing at sinkside   · LB Dressing: stand by assistance donning socks at EOB  · Toileting: supervision from C    Patient left up in chair with all lines intact, call button in reach and family present    [facility] 6 Click:  [facility] Total Score: 19    Treatment &  Education:  · Pt and pt family educated on safety awareness with functional transfers and with completion of ADLS  · Pt completed ADLS and functional mobility for treatment session as noted above  · Pt educated on safe set-up for showers and car transfers once D/C home   · White board/Communication board updated       Education:    Assessment:     Abbey Vargas is a 67 y.o. female with a medical diagnosis of Primary osteoarthritis of right knee.  She presents with functional impairments including the following: weakness, impaired endurance, impaired self care skills, impaired functional mobilty, gait instability, impaired balance, decreased coordination, decreased lower extremity function, pain, orthopedic precautions.  Pt tolerated OT session well, with improved functional activity tolerance and with increased independence with ADLs and mobility requiring SBA.       Rehab Prognosis:  Good; patient would benefit from acute skilled OT services to address these deficits and reach maximum level of function.       Plan:     Patient to be seen daily to address the above listed problems via self-care/home management, therapeutic activities, therapeutic exercises  · Plan of Care Expires:    · Plan of Care Reviewed with: patient, family    This Plan of care has been discussed with the patient who was involved in its development and understands and is in agreement with the identified goals and treatment plan    GOALS:    Occupational Therapy Goals        Problem: Occupational Therapy Goal    Goal Priority Disciplines Outcome Interventions   Occupational Therapy Goal     OT, PT/OT Ongoing (interventions implemented as appropriate)    Description:  Goals to be met by: 7 days    Patient will increase functional independence with ADLs by performing:    UE Dressing with Supervision.  LE Dressing with Supervision with AD as needed.  Grooming while standing with Supervision.  Toileting from bedside commode with Supervision  for hygiene and clothing management.   Stand pivot transfers with Supervision.  Toilet transfer to bedside commode with Supervision.                          Time Tracking:     OT Date of Treatment: 02/08/18  OT Start Time: 0712  OT Stop Time: 0755  OT Total Time (min): 43 min    Billable Minutes:Self Care/Home Management 43    Félix Chase, KIMBERLY  2/8/2018

## 2018-02-08 NOTE — PLAN OF CARE
POD 1 s/p right TKA. PT/OT recommended home health and no additional DME needs. Plans to discharge patient home today with Home Health and family support. Patient's family is present at the bedside. Patient and patient's family verbalized understanding. SW and CM will continue to follow for any additional needs. Discharge and follow-up instructions to be completed by the bedside nurse.    Future Appointments  Date Time Provider Department Center   2/20/2018 10:45 AM Yarelis Aguilar PA-C [facility] ORTHO [place]   3/7/2018 10:40 AM Ama Alvarado NP [facility] FAM MED [facility] ACC   3/21/2018 1:15 PM MAITE, PMR RESIDENT [facility] PHYSMED [facility] ACC   4/19/2018 8:30 AM Roberto Nichols MD [facility] RHEUM [facility] ACT   4/19/2018 9:15 AM Fantasma Chavez MD [facility] PULM [facility] ACT   6/4/2018 10:00 AM OPHTHALMOLOGY GENERAL, [place] [facility] OPHTHAL [facility] GREEN      02/08/18 1223   Final Note   Assessment Type Final Discharge Note   Discharge Disposition Home-Health   What phone number can be called within the next 1-3 days to see how you are doing after discharge? ([phone])   Hospital Follow Up  Appt(s) scheduled? Yes   Discharge plans and expectations educations in teach back method with documentation complete? Yes

## 2018-02-08 NOTE — PLAN OF CARE
12:40 PM  SW received home health orders. Faxed orders to N as pt's insurance chooses home health company.     Jessy Boogie LMSW   [facility]  Ext [phone]

## 2018-02-08 NOTE — NURSING
Pt ready for discharge. Discharge instructions and prescriptions given and explained to patient. Patient verbalizes understanding. PIV removed. No further questions from pt. Pt to be discharged via wheelchair. Will cont to monitor.

## 2018-02-08 NOTE — PLAN OF CARE
Problem: Patient Care Overview  Goal: Plan of Care Review  Outcome: Ongoing (interventions implemented as appropriate)  Patient AAOx4. Continuous complaint of pain, anesthesiology notified. BP elevated, sinus jessica, afrebrile. Neurovascular intact. Skin intact, with exception of incision of RLE. Free from falls. Frequent rounds made for safety, pain and comfort. Bed at lowest position, call light within reach, side rails up x2. Family at bedside.

## 2018-02-08 NOTE — PLAN OF CARE
Problem: Occupational Therapy Goal  Goal: Occupational Therapy Goal  Goals to be met by: 7 days    Patient will increase functional independence with ADLs by performing:    UE Dressing with Supervision.  LE Dressing with Supervision with AD as needed.  Grooming while standing with Supervision.  Toileting from bedside commode with Supervision for hygiene and clothing management.   Stand pivot transfers with Supervision.  Toilet transfer to bedside commode with Supervision.        Outcome: Ongoing (interventions implemented as appropriate)  Pt progressing towards all goals at this time. Revise goals as needed.     Félix Chase, OT  2/8/2018

## 2018-02-08 NOTE — ADDENDUM NOTE
Addendum  created 02/08/18 1128 by Hilary Escudero RN    Sign clinical note, Visit Navigator SmartForm Flowsheet section accepted

## 2018-02-08 NOTE — NURSING
Pt constantly c/o pain in RLE, not relieved my PO or IV pain meds and not relieved by continuous PNC either. MD notified, verbal for 5cc bolus of ropivicain through PNC. Will continue to monitor.

## 2018-02-08 NOTE — ASSESSMENT & PLAN NOTE
67 y.o. female POD1 s/p R TKA    Pain control  PT/OT: WBAT RLE  DVT PPx:  BID, FCDs at all times when not ambulating  Abx: postop Ancef  Labs: reviewed  Drain: none  Galdamez: none    Dispo: f/u PT recs

## 2018-02-08 NOTE — PT/OT/SLP PROGRESS
Physical Therapy Treatment    Patient Name:  Abbey Vargas   MRN:  [id number]    Recommendations:     Discharge Recommendations:  home with home health   Discharge Equipment Recommendations: none   Barriers to discharge: None    Assessment:     Abbey Vargas is a 67 y.o. female admitted with a medical diagnosis of Primary osteoarthritis of right knee.  She presents with the following impairments/functional limitations:  weakness, impaired endurance, impaired functional mobilty, decreased lower extremity function, pain, decreased ROM.    Pt with very good tolerance to session, with no increased pain or complications. Able to amb 182ft and SBA with no LOB or instability. Pt has been educated on safety in the home environment and has verbalized understanding. Pt has maximized functional outcomes in the acute care environment and fall risk has been minimized. Pt will not require second BID session and is appropriate for d/c with HHPT.     Rehab Prognosis:  Good; patient would benefit from acute skilled PT services to address these deficits and reach maximum level of function.      Recent Surgery: Procedure(s) (LRB):  REPLACEMENT-KNEE-TOTAL (Right) 1 Day Post-Op    Plan:     During this hospitalization, patient to be seen daily to address the above listed problems via gait training, therapeutic activities, therapeutic exercises, neuromuscular re-education  · Plan of Care Expires:  03/07/18   Plan of Care Reviewed with: patient, family    Subjective     Communicated with nsg prior to session.  Patient found up in chair upon PT entry to room, agreeable to treatment.      Chief Complaint: impaired functional mobility  Patient comments/goals: return home to [facility]  Pain/Comfort:  · Pain Rating 1: 4/10  · Location - Side 1: Right  · Location - Orientation 1: generalized  · Location 1: knee  · Pain Addressed 1: Pre-medicate for activity, Reposition, Cessation of Activity    Patients cultural, spiritual, Nondenominational  conflicts given the current situation: none    Objective:     Patient found with: perineural catheter, telemetry, FCD     General Precautions: Standard, fall   Orthopedic Precautions:RLE weight bearing as tolerated   Braces: N/A     Functional Mobility:  · Bed Mobility:     · Scooting: supervision  · Supine to Sit: supervision  · Sit to Supine: supervision  · Transfers:     · 2x Sit to Stand:  stand by assistance with rolling walker  · Gait: 1x182 ft with RW and SBA with no LOB or instability  · VC's for step length, RW management, gait sequencing, and upright posture      AM-PAC 6 CLICK MOBILITY  Turning over in bed (including adjusting bedclothes, sheets and blankets)?: 4  Sitting down on and standing up from a chair with arms (e.g., wheelchair, bedside commode, etc.): 4  Moving from lying on back to sitting on the side of the bed?: 4  Moving to and from a bed to a chair (including a wheelchair)?: 4  Need to walk in hospital room?: 4  Climbing 3-5 steps with a railing?: 3  Total Score: 23       Therapeutic Activities and Exercises:    Pt performed 15-30 reps of TKR protocol:  1. AP  2. QS  3. GS  4. SAQ AAROM  5. Heel slides AAROM  6. Hip abd AAROM  7. SLR AAROM  8. LAQ     Pt educated on:  1. Safety with AD during ambulation and transfers  2. Safety in the home environment  3. Length of stay and d/c plans  4. Role of PT and POC  5. Car t/f  6. Importance of OOB activity to promote healing        Patient left up in chair with all lines intact and call button in reach..    GOALS:    Physical Therapy Goals     Not on file          Multidisciplinary Problems (Resolved)        Problem: Physical Therapy Goal    Goal Priority Disciplines Outcome Goal Variances Interventions   Physical Therapy Goal   (Resolved)     PT/OT, PT Outcome(s) achieved     Description:  Goals to be met by: 18     Patient will increase functional independence with mobility by performin. Supine to sit with Supervision- Met 18  2.  Sit to supine with Supervision- Met 2/7/18  3. Sit to stand transfer with Stand-by Assistance- Met 2/7/18  4. Gait  x 150 feet with Stand-by Assistance using Rolling Walker. - Met 2/7/18  5. Lower extremity exercise program x 30 reps per handout, with independence- Met 2/7/18                       Time Tracking:     PT Received On: 02/08/18  PT Start Time: 0843     PT Stop Time: 0937  PT Total Time (min): 54 min     Billable Minutes: Gait Training 15, Therapeutic Activity 15 and Therapeutic Exercise 24    Treatment Type: Treatment  PT/PTA: PT           Venessa Sykes, SPT  02/08/2018

## 2018-02-08 NOTE — SUBJECTIVE & OBJECTIVE
"Principal Problem:Primary osteoarthritis of right knee    Principal Orthopedic Problem: same    Interval History: Patient seen and examined at bedside.  No acute events overnight.  Pain controlled.  Walked with PT yesterday.    Review of patient's allergies indicates:  No Known Allergies    Current Facility-Administered Medications   Medication    0.9%  NaCl infusion    acetaminophen tablet 1,000 mg    amitriptyline tablet 50 mg    aspirin EC tablet 325 mg    bisacodyl suppository 10 mg    calcium carbonate tablet 1,000 mg    ceFAZolin injection 2 g    famotidine tablet 20 mg    levothyroxine tablet 100 mcg    morphine injection 2 mg    morphine injection 2 mg    naloxone 0.4 mg/mL injection 0.02 mg    ondansetron disintegrating tablet 4 mg    ondansetron injection 4 mg    oxyCODONE immediate release tablet 10 mg    oxyCODONE immediate release tablet 5 mg    polyethylene glycol packet 17 g    pravastatin tablet 80 mg    pregabalin capsule 75 mg    ramelteon tablet 8 mg    ropivacaine (PF) 2 mg/ml (0.2%) infusion    ropivacaine 0.2% ON-Q C-BLOC 400 ML (SELECT A FLOW)    senna-docusate 8.6-50 mg per tablet 1 tablet    sodium chloride 0.9% flush 3 mL     Objective:     Vital Signs (Most Recent):  Temp: 98.7 °F (37.1 °C) (02/08/18 0306)  Pulse: 80 (02/08/18 0306)  Resp: 20 (02/08/18 0306)  BP: (!) 165/85 (02/08/18 0306)  SpO2: 99 % (02/08/18 0306) Vital Signs (24h Range):  Temp:  [96.4 °F (35.8 °C)-98.7 °F (37.1 °C)] 98.7 °F (37.1 °C)  Pulse:  [49-85] 80  Resp:  [11-27] 20  SpO2:  [91 %-100 %] 99 %  BP: ()/(57-98) 165/85     Weight: 80.7 kg (178 lb)  Height: 5' 4" (162.6 cm)  Body mass index is 30.55 kg/m².      Intake/Output Summary (Last 24 hours) at 02/08/18 0611  Last data filed at 02/07/18 1345   Gross per 24 hour   Intake           2497.5 ml   Output               50 ml   Net           2447.5 ml       Ortho/SPM Exam  AAOx4  NAD  RRR  No increased WOB  Aquacel c/d/i  Polar ice in " place  SILT and motor intact T/SP/DP  WWP extremities  FCDs in place and functioning    Significant Labs: All pertinent labs within the past 24 hours have been reviewed.    Significant Imaging: I have reviewed all pertinent imaging results/findings.

## 2018-02-09 NOTE — ANESTHESIA POST-OP PAIN MANAGEMENT
Pain remains well controlled. OnQ system discussed with pt. All pt questions answered. No symptoms of local anesthetic toxicity. PNC to be removed tomorrow.     Shun Almonte MD  Anesthesiology Resident PGY 4

## 2018-02-09 NOTE — DISCHARGE SUMMARY
[facility]  Orthopedics  Discharge Summary      Patient Name: Abbey Vargas  MRN: [id number]  Admission Date: 2/7/2018  Hospital Length of Stay: 1 days  Discharge Date and Time: 2/8/2018  1:54 PM  Attending Physician: [facility], MD  Discharging Provider: Trevon Sotelo MD  Primary Care Provider: Ama Alvarado NP    HPI: Abbey Vargas is a 67 y.o. female with 9 month history of Right knee pain. Pain is worse with activity and weight bearing.  Patient has experienced interference of activities of daily living due to decreased range of motion and an increase in joint pain and swelling.  Patient has failed non-operative treatment including NSAIDs, corticosteroid injections, viscosupplement injections, and activity modification.  Abbey Vargas currently ambulates using assistive device. She had arthroscopy of right knee at an outside facility in June of 2017.    Procedure(s) (LRB):  REPLACEMENT-KNEE-TOTAL (Right)      Hospital Course: Patient presented for above procedure.  Tolerated it well and was discharged home POD1 after voiding, tolerating diet, ambulating, pain controlled.  Discharge instructions, follow-up appointment, and med rec are below.          Pending Diagnostic Studies:     None        Final Active Diagnoses:    Diagnosis Date Noted POA    PRINCIPAL PROBLEM:  Primary osteoarthritis of right knee [M17.11] 02/07/2018 Yes    PONV (postoperative nausea and vomiting) [R11.2, Z98.890] 01/30/2018 Yes    Thyroid disorder [E07.9] 12/23/2014 Yes    Hyperlipidemia [E78.5] 12/23/2014 Yes    Essential hypertension [I10] 12/23/2014 Yes    RAULITO (obstructive sleep apnea) [G47.33] 12/23/2014 Yes      Problems Resolved During this Admission:    Diagnosis Date Noted Date Resolved POA      Discharged Condition: stable    Disposition: Home or Self Care    Follow Up:  Follow-up Information     Yarelis Aguilar PA-C In 2 weeks.    Specialty:  Orthopedic Surgery  Contact  information:  [address]  [facility] [address]  [phone]                 Patient Instructions:     Call MD for:  temperature >100.4     Call MD for:  persistent nausea and vomiting or diarrhea     Call MD for:  severe uncontrolled pain     Call MD for:  redness, tenderness, or signs of infection (pain, swelling, redness, odor or green/yellow discharge around incision site)     Call MD for:  difficulty breathing or increased cough     Call MD for:  severe persistent headache     Call MD for:  worsening rash     Call MD for:  persistent dizziness, light-headedness, or visual disturbances     Call MD for:  increased confusion or weakness       Medications:  Reconciled Home Medications:   Discharge Medication List as of 2/8/2018 11:52 AM      START taking these medications    Details   aspirin 325 MG tablet Take 1 tablet (325 mg total) by mouth 2 (two) times daily., Starting Wed 2/7/2018, Until Fri 3/9/2018, Print      ondansetron (ZOFRAN-ODT) 8 MG TbDL Take 1 tablet (8 mg total) by mouth every 12 (twelve) hours as needed (nausea)., Starting Wed 2/7/2018, Print      oxyCODONE-acetaminophen (PERCOCET) 5-325 mg per tablet Take 1-2 tablets by mouth every 4 to 6 hours as needed for Pain., Starting Wed 2/7/2018, Print         CONTINUE these medications which have CHANGED    Details   docusate sodium (COLACE) 100 MG capsule Take 1 capsule (100 mg total) by mouth 2 (two) times daily as needed for Constipation., Starting Wed 2/7/2018, Print         CONTINUE these medications which have NOT CHANGED    Details   amitriptyline (ELAVIL) 50 MG tablet Take 1 tablet (50 mg total) by mouth every evening., Starting Mon 8/28/2017, Until Tue 8/28/2018, Normal      BIOTIN ORAL Take 1 capsule by mouth once daily. , Historical Med      calcium-vitamin D3 (CALCIUM 500 + D) 500 mg(1,250mg) -200 unit per tablet Take 1 tablet by mouth 2 (two) times daily with meals., Until Discontinued, Historical Med      doxepin (ZONALON) 5 % cream  Starting Thu 7/27/2017, Historical Med      fish oil-omega-3 fatty acids 300-1,000 mg capsule Take 2 g by mouth once daily., Until Discontinued, Historical Med      levothyroxine (SYNTHROID) 100 MCG tablet TAKE 1 TABLET BY MOUTH EVERY DAY, Normal      lisinopril 10 MG tablet Take 1 tablet (10 mg total) by mouth every evening., Starting Mon 8/28/2017, Normal      multivitamin (THERAGRAN) per tablet Take 1 tablet by mouth once daily., Until Discontinued, Historical Med      pravastatin (PRAVACHOL) 80 MG tablet Take 1 tablet (80 mg total) by mouth once daily., Starting Mon 8/28/2017, Normal         STOP taking these medications       aspirin (ECOTRIN) 81 MG EC tablet Comments:   Reason for Stopping:         cyclobenzaprine (FLEXERIL) 5 MG tablet Comments:   Reason for Stopping:         diclofenac sodium (VOLTAREN) 1 % Gel Comments:   Reason for Stopping:         lidocaine-prilocaine (EMLA) cream Comments:   Reason for Stopping:         meloxicam (MOBIC) 7.5 MG tablet Comments:   Reason for Stopping:               Trevon Sotelo MD  Orthopedics  [facility]

## 2018-02-10 NOTE — PROGRESS NOTES
02/10/2018    Called and spoke to patient about OnQ PNC.  Pain well controlled.  Denies tinnitus, metallic taste in mouth, weakness in extremity.  Denies erythema, warmth, tenderness, bleeding at site of catheter entry.  Dressing c/d/i.  Catheter not yet removed.  Reinforced the importance of removing catheter today and looking for the blue tip on catheter.  All questions answered.  Encouraged them to call the provided number if any issues arise.    Yosef Ann MD PGY2/CA1  Anesthesiology  [facility]  Pager: [phone]

## 2018-02-10 NOTE — PROGRESS NOTES
02/10/2018  1:30 PM    Called by patient's daughter who is present with patient.  She states that the catheter was pulled,that the tip was rounded and blue, but appears smaller than in the brochure picture.  Judging by her statements, I am very confident that the entire catheter including the tip came out and discussed this with the patient's daughter.  The patient's daughter stated that she would keep the tip and show it to the home health nurse to verify that the entire tip was removed.    Yosef Ann MD PGY2/CA1  Anesthesiology  [facility]  Pager: [phone]

## 2018-02-11 NOTE — ADDENDUM NOTE
Addendum  created 02/11/18 0843 by Heron Cottrell MD    Anesthesia Event edited, Sign clinical note

## 2018-02-11 NOTE — ANESTHESIA POST-OP PAIN MANAGEMENT
Called to discuss ON-Q catheter, patient removed it yesterday with tip intact and no issues.    Heron Cottrell, CA-2

## 2018-02-13 ENCOUNTER — OFFICE VISIT (OUTPATIENT)
Dept: URGENT CARE | Facility: CLINIC | Age: 68
End: 2018-02-13
Payer: MEDICARE

## 2018-02-13 VITALS
SYSTOLIC BLOOD PRESSURE: 147 MMHG | OXYGEN SATURATION: 98 % | HEART RATE: 108 BPM | DIASTOLIC BLOOD PRESSURE: 95 MMHG | TEMPERATURE: 100 F | RESPIRATION RATE: 16 BRPM | WEIGHT: 178 LBS | HEIGHT: 64 IN | BODY MASS INDEX: 30.39 KG/M2

## 2018-02-13 DIAGNOSIS — M79.604 PAIN OF RIGHT LOWER EXTREMITY: Primary | ICD-10-CM

## 2018-02-13 PROCEDURE — 99214 OFFICE O/P EST MOD 30 MIN: CPT | Mod: S$GLB,,, | Performed by: FAMILY MEDICINE

## 2018-02-13 PROCEDURE — 1159F MED LIST DOCD IN RCRD: CPT | Mod: S$GLB,,, | Performed by: FAMILY MEDICINE

## 2018-02-13 PROCEDURE — 3008F BODY MASS INDEX DOCD: CPT | Mod: S$GLB,,, | Performed by: FAMILY MEDICINE

## 2018-02-13 RX ORDER — NAPROXEN 500 MG/1
500 TABLET ORAL 2 TIMES DAILY WITH MEALS
Qty: 20 TABLET | Refills: 0 | Status: SHIPPED | OUTPATIENT
Start: 2018-02-13 | End: 2018-03-07

## 2018-02-13 NOTE — PROGRESS NOTES
"Subjective:       Patient ID: Abbey Vargas is a 67 y.o. female.    Vitals:  height is 5' 4" (1.626 m) and weight is 80.7 kg (178 lb). Her oral temperature is 100 °F (37.8 °C). Her blood pressure is 147/95 (abnormal) and her pulse is 108. Her respiration is 16 and oxygen saturation is 98%.     Chief Complaint: Leg Pain (right)    Leg Pain    The incident occurred 2 days ago. The incident occurred at home. There was no injury mechanism. The pain is present in the right leg. The quality of the pain is described as aching. The pain is at a severity of 8/10. The pain has been worsening since onset. Associated symptoms include an inability to bear weight. She reports no foreign bodies present. Nothing aggravates the symptoms. She has tried nothing for the symptoms. The treatment provided no relief.     Review of Systems   Constitution: Negative for chills and fever.   HENT: Negative for sore throat.    Eyes: Negative for blurred vision.   Cardiovascular: Negative for chest pain.   Respiratory: Negative for shortness of breath.    Skin: Negative for rash.   Musculoskeletal: Positive for joint pain, joint swelling and stiffness. Negative for back pain.   Gastrointestinal: Negative for abdominal pain, diarrhea, nausea and vomiting.   Neurological: Negative for headaches.   Psychiatric/Behavioral: The patient is not nervous/anxious.        Objective:      Physical Exam   Constitutional: She is oriented to person, place, and time. She appears well-developed and well-nourished. She is cooperative.  Non-toxic appearance. She does not appear ill. No distress.   HENT:   Head: Normocephalic and atraumatic. Head is without abrasion, without contusion and without laceration.   Right Ear: Hearing, tympanic membrane, external ear and ear canal normal. No hemotympanum.   Left Ear: Hearing, tympanic membrane, external ear and ear canal normal. No hemotympanum.   Nose: Nose normal. No mucosal edema, rhinorrhea or nasal deformity. " No epistaxis. Right sinus exhibits no maxillary sinus tenderness and no frontal sinus tenderness. Left sinus exhibits no maxillary sinus tenderness and no frontal sinus tenderness.   Mouth/Throat: Uvula is midline, oropharynx is clear and moist and mucous membranes are normal. No trismus in the jaw. Normal dentition. No uvula swelling. No posterior oropharyngeal erythema.   Eyes: Conjunctivae, EOM and lids are normal. Pupils are equal, round, and reactive to light. Right eye exhibits no discharge. Left eye exhibits no discharge. No scleral icterus.   Sclera clear bilat   Neck: Trachea normal, normal range of motion, full passive range of motion without pain and phonation normal. Neck supple. No spinous process tenderness and no muscular tenderness present. No neck rigidity. No tracheal deviation present.   Cardiovascular: Normal rate, regular rhythm, normal heart sounds, intact distal pulses and normal pulses.    Pulmonary/Chest: Effort normal and breath sounds normal. No respiratory distress.   Abdominal: Soft. Normal appearance and bowel sounds are normal. She exhibits no distension, no pulsatile midline mass and no mass. There is no tenderness.   Musculoskeletal: Normal range of motion. She exhibits no edema or deformity.        Right lower leg: She exhibits tenderness and bony tenderness.   Neurological: She is alert and oriented to person, place, and time. She has normal strength. No cranial nerve deficit or sensory deficit. She exhibits normal muscle tone. She displays no seizure activity. Coordination normal. GCS eye subscore is 4. GCS verbal subscore is 5. GCS motor subscore is 6.   Skin: Skin is warm, dry and intact. Capillary refill takes less than 2 seconds. No abrasion, no bruising, no burn, no ecchymosis and no laceration noted. She is not diaphoretic. No pallor.   Psychiatric: She has a normal mood and affect. Her speech is normal and behavior is normal. Judgment and thought content normal. Cognition  and memory are normal.   Nursing note and vitals reviewed.    Type of Interpretation: ED Physician (Independently Interpreted).  Radiology Procedure Done: Right Lower Leg.  Interpretation: No fx seen.      Assessment:       1. Pain of right lower extremity        Plan:         Pain of right lower extremity  -     X-Ray Tibia Fibula 2 View Right; Future; Expected date: 02/13/2018    Other orders  -     naproxen (NAPROSYN) 500 MG tablet; Take 1 tablet (500 mg total) by mouth 2 (two) times daily with meals.  Dispense: 20 tablet; Refill: 0      Please drink plenty of fluids.  Please get plenty of rest.  Please return here or go to the Emergency Department for any concerns or worsening of condition.  If you were prescribed a narcotic medication, do not drive or operate heavy equipment or machinery while taking these medications.  If you were not prescribed an anti-inflammatory medication, and if you do not have any history of stomach/intestinal ulcers, or kidney disease, or are not taking a blood thinner such as Coumadin, Plavix, Pradaxa, Eloquis, or Xaralta for example, it is OK to take over the counter Ibuprofen or Advil or Motrin or Aleve as directed.  Do not take these medications on an empty stomach.  Rest, ice, compression and elevation to the affected joint or limb as needed.    If you were given a sling, wear it for comfort until follow up as arranged.  If you were given or placed in a splint, wear it until your follow up visit or recheck.  If you  smoke, please stop smoking.       Please follow up with your primary care doctor or specialist as needed.    Ama Alvarado, YONIS  [phone]

## 2018-02-14 NOTE — PATIENT INSTRUCTIONS
Please drink plenty of fluids.  Please get plenty of rest.  Please return here or go to the Emergency Department for any concerns or worsening of condition.  If you were prescribed a narcotic medication, do not drive or operate heavy equipment or machinery while taking these medications.  If you were not prescribed an anti-inflammatory medication, and if you do not have any history of stomach/intestinal ulcers, or kidney disease, or are not taking a blood thinner such as Coumadin, Plavix, Pradaxa, Eloquis, or Xaralta for example, it is OK to take over the counter Ibuprofen or Advil or Motrin or Aleve as directed.  Do not take these medications on an empty stomach.  Rest, ice, compression and elevation to the affected joint or limb as needed.    If you were given a sling, wear it for comfort until follow up as arranged.  If you were given or placed in a splint, wear it until your follow up visit or recheck.  If you  smoke, please stop smoking.       Please follow up with your primary care doctor or specialist as needed.    Ama Alvarado, NP  [phone]      Muscle Strain in the Extremities  A muscle strain is a stretching and tearing of muscle fibers. This causes pain, especially when you move that muscle. There may also be some swelling and bruising.  Home care  · Keep the hurt area raised to reduce pain and swelling. This is especially important during the first 48 hours.  · Apply an ice pack over the injured area for 15 to 20 minutes every 3 to 6 hours. You should do this for the first 24 to 48 hours. You can make an ice pack by filling a plastic bag that seals at the top with ice cubes and then wrapping it with a thin towel. Be careful not to injure your skin with the ice treatments. Ice should never be applied directly to skin. Continue the use of ice packs for relief of pain and swelling as needed. After 48 hours, apply heat (warm shower or warm bath) for 15 to 20 minutes several times a day, or alternate  ice and heat.  · You may use over-the-counter pain medicine to control pain, unless another medicine was prescribed. If you have chronic liver or kidney disease or ever had a stomach ulcer or GI bleeding, talk with your healthcare provider before using these medicines.  · For leg strains: If crutches have been recommended, dont put full weight on the hurt leg until you can do so without pain. You can return to sports when you are able to hop and run on the injured leg without pain.  Follow-up care  Follow up with your healthcare provider, or as advised.  When to seek medical advice  Call your healthcare provider right away if any of these occur:  · The toes of the injured leg become swollen, cold, blue, numb, or tingly  · Pain or swelling increases  Date Last Reviewed: 11/19/2015  © 7481-7840 [employer]. [address] [place] [address]. All rights reserved. This information is not intended as a substitute for professional medical care. Always follow your healthcare professional's instructions.

## 2018-02-15 NOTE — PT/OT/SLP DISCHARGE
Occupational Therapy Discharge Summary    Abbey Vargas  MRN: [id number]   Principal Problem: Primary osteoarthritis of right knee      Patient Discharged from acute Occupational Therapy on 02/08/2018.      Assessment:      Patient was discharged unexpectedly.  Information required to complete an accurate discharge summary is unknown.  Refer to therapy initial evaluation and last progress note for initial and most recent functional status and goal achievement.  Recommendations made may be found in medical record.    Objective:     GOALS:    Occupational Therapy Goals        Problem: Occupational Therapy Goal    Goal Priority Disciplines Outcome Interventions   Occupational Therapy Goal     OT, PT/OT Ongoing (interventions implemented as appropriate)    Description:  Goals to be met by: 7 days    Patient will increase functional independence with ADLs by performing:    UE Dressing with Supervision.  LE Dressing with Supervision with AD as needed.  Grooming while standing with Supervision.  Toileting from bedside commode with Supervision for hygiene and clothing management.   Stand pivot transfers with Supervision.  Toilet transfer to bedside commode with Supervision.                          Reasons for Discontinuation of Therapy Services  Transfer to alternate level of care.      Plan:     Patient Discharged to: Home with Home Health Service    Félix Chase OT  2/15/2018

## 2018-02-16 ENCOUNTER — TELEPHONE (OUTPATIENT)
Dept: URGENT CARE | Facility: CLINIC | Age: 68
End: 2018-02-16

## 2018-02-20 ENCOUNTER — OFFICE VISIT (OUTPATIENT)
Dept: ORTHOPEDICS | Facility: CLINIC | Age: 68
End: 2018-02-20
Payer: MEDICARE

## 2018-02-20 VITALS — WEIGHT: 176.38 LBS | BODY MASS INDEX: 30.11 KG/M2 | HEIGHT: 64 IN

## 2018-02-20 DIAGNOSIS — Z96.651 STATUS POST TOTAL RIGHT KNEE REPLACEMENT: Primary | ICD-10-CM

## 2018-02-20 PROCEDURE — 99999 PR PBB SHADOW E&M-EST. PATIENT-LVL III: CPT | Mod: PBBFAC,,, | Performed by: PHYSICIAN ASSISTANT

## 2018-02-20 PROCEDURE — 99024 POSTOP FOLLOW-UP VISIT: CPT | Mod: S$GLB,,, | Performed by: PHYSICIAN ASSISTANT

## 2018-02-21 ENCOUNTER — PATIENT MESSAGE (OUTPATIENT)
Dept: ORTHOPEDICS | Facility: CLINIC | Age: 68
End: 2018-02-21

## 2018-02-21 ENCOUNTER — TELEPHONE (OUTPATIENT)
Dept: ORTHOPEDICS | Facility: CLINIC | Age: 68
End: 2018-02-21

## 2018-02-21 NOTE — TELEPHONE ENCOUNTER
----- Message from Tricia Bauman sent at 2/21/2018 12:19 PM CST -----  Contact: self  Patient states was informed by [employer] [facility] [employer] that need physical therapy orders fax to them so they can process request. [facility] contact number  phone Patient can be reached at

## 2018-02-21 NOTE — PROGRESS NOTES
"Abbey Vargas presents for initial post-operative visit following a right total knee arthroplasty performed by Dr. [facility] on 2/7/2018. Tolerating pain medication well. Off of pain medication.     Exam:   Height 5' 4" (1.626 m), weight 80 kg (176 lb 5.9 oz).   Ambulating well with assistive device.  Incision is clean and dry without drainage or erythema. ROM:0-100    Initial post-operative radiographs reviewed today revealing a well fixed and aligned prosthesis.    A/P:  2 weeks s/p right total knee replacement  Dr. [facility] interviewed and examined patient today and agrees with plan.   - The patient was advised to keep the incision clean and dry for the next 24 hours after which she may wash the area with antibacterial soap in the shower. Will not submerge until the incision is completely healed.   - Outpatient PT: Gave patient order to take to location in [place].   - Continue ASA for 1 month from surgery.  - Follow up in 4 weeks with Dr. [facility]. Pt will call clinic with problems/concerns.     "

## 2018-02-21 NOTE — TELEPHONE ENCOUNTER
Called patient to let her know that I am not in office today and Yarelis is in surgery. All info will be faxed to her insurance company tomorrow after 1:30pm as I have already spoke with them.

## 2018-02-22 ENCOUNTER — TELEPHONE (OUTPATIENT)
Dept: ORTHOPEDICS | Facility: CLINIC | Age: 68
End: 2018-02-22

## 2018-02-22 DIAGNOSIS — Z96.659 TOTAL KNEE REPLACEMENT STATUS, UNSPECIFIED LATERALITY: Primary | ICD-10-CM

## 2018-02-25 ENCOUNTER — OFFICE VISIT (OUTPATIENT)
Dept: URGENT CARE | Facility: CLINIC | Age: 68
End: 2018-02-25
Payer: MEDICARE

## 2018-02-25 VITALS
HEIGHT: 64 IN | HEART RATE: 108 BPM | SYSTOLIC BLOOD PRESSURE: 120 MMHG | BODY MASS INDEX: 30.05 KG/M2 | DIASTOLIC BLOOD PRESSURE: 82 MMHG | TEMPERATURE: 101 F | WEIGHT: 176 LBS | OXYGEN SATURATION: 97 %

## 2018-02-25 DIAGNOSIS — N39.0 URINARY TRACT INFECTION WITH HEMATURIA, SITE UNSPECIFIED: ICD-10-CM

## 2018-02-25 DIAGNOSIS — M79.672 LEFT FOOT PAIN: ICD-10-CM

## 2018-02-25 DIAGNOSIS — R31.9 URINARY TRACT INFECTION WITH HEMATURIA, SITE UNSPECIFIED: ICD-10-CM

## 2018-02-25 DIAGNOSIS — R50.9 FEVER, UNSPECIFIED FEVER CAUSE: Primary | ICD-10-CM

## 2018-02-25 LAB
BILIRUB UR QL STRIP: NEGATIVE
GLUCOSE UR QL STRIP: NEGATIVE
KETONES UR QL STRIP: NEGATIVE
LEUKOCYTE ESTERASE UR QL STRIP: POSITIVE
PH, POC UA: 6 (ref 5–8)
POC BLOOD, URINE: NEGATIVE
POC NITRATES, URINE: NEGATIVE
PROT UR QL STRIP: NEGATIVE
SP GR UR STRIP: 1.01 (ref 1–1.03)
UROBILINOGEN UR STRIP-ACNC: ABNORMAL (ref 0.1–1.1)

## 2018-02-25 PROCEDURE — 99214 OFFICE O/P EST MOD 30 MIN: CPT | Mod: 25,S$GLB,, | Performed by: FAMILY MEDICINE

## 2018-02-25 PROCEDURE — 3008F BODY MASS INDEX DOCD: CPT | Mod: S$GLB,,, | Performed by: FAMILY MEDICINE

## 2018-02-25 PROCEDURE — 1159F MED LIST DOCD IN RCRD: CPT | Mod: S$GLB,,, | Performed by: FAMILY MEDICINE

## 2018-02-25 PROCEDURE — 81003 URINALYSIS AUTO W/O SCOPE: CPT | Mod: QW,S$GLB,, | Performed by: FAMILY MEDICINE

## 2018-02-25 RX ORDER — CIPROFLOXACIN 500 MG/1
500 TABLET ORAL 2 TIMES DAILY
Qty: 20 TABLET | Refills: 0 | Status: SHIPPED | OUTPATIENT
Start: 2018-02-25 | End: 2018-03-07

## 2018-02-25 RX ORDER — NAPROXEN 500 MG/1
500 TABLET ORAL 2 TIMES DAILY WITH MEALS
Qty: 20 TABLET | Refills: 0 | Status: SHIPPED | OUTPATIENT
Start: 2018-02-25 | End: 2018-03-07

## 2018-02-25 NOTE — PATIENT INSTRUCTIONS
"Please return here or go to the Emergency Department for any concerns or worsening of condition.  If you were prescribed antibiotics, please take them to completion.  If you were prescribed a narcotic medication, do not drive or operate heavy equipment or machinery while taking these medications.  Please follow up with your primary care doctor or specialist as needed.  Please drink plenty of fluids.  Please get plenty of rest.  If you were prescribed Pyridium (phenazopyridine), please be aware that if you wear contact lens that this medication may stain your contacts.  While taking this medication it is recommended that you do not wear your contacts until 24 hours after your last dose.    Push fluids aggressively to improve symptoms and wash through the infection.  Cranberry juice can help relieve symptoms  If you  smoke, please stop smoking.    Please follow up with your primary care doctor or specialist as needed.    Ama Alvarado, NP  [phone]      Bladder Infection, Female (Adult)    Urine is normally doesn't have any bacteria in it. But bacteria can get into the urinary tract from the skin around the rectum. Or they can travel in the blood from elsewhere in the body. Once they are in your urinary tract, they can cause infection in the urethra (urethritis), the bladder (cystitis), or the kidneys (pyelonephritis).  The most common place for an infection is in the bladder. This is called a bladder infection. This is one of the most common infections in women. Most bladder infections are easily treated. They are not serious unless the infection spreads to the kidney.  The phrases "bladder infection," "UTI," and "cystitis" are often used to describe the same thing. But they are not always the same. Cystitis is an inflammation of the bladder. The most common cause of cystitis is an infection.  Symptoms  The infection causes inflammation in the urethra and bladder. This causes many of the symptoms. The most " common symptoms of a bladder infection are:  · Pain or burning when urinating  · Having to urinate more often than usual  · Urgent need to urinate  · Only a small amount of urine comes out  · Blood in urine  · Abdominal discomfort. This is usually in the lower abdomen above the pubic bone.  · Cloudy urine  · Strong- or bad-smelling urine  · Unable to urinate (urinary retention)  · Unable to hold urine in (urinary incontinence)  · Fever  · Loss of appetite  · Confusion (in older adults)  Causes  Bladder infections are not contagious. You can't get one from someone else, from a toilet seat, or from sharing a bath.  The most common cause of bladder infections is bacteria from the bowels. The bacteria get onto the skin around the opening of the urethra. From there, they can get into the urine and travel up to the bladder, causing inflammation and infection. This usually happens because of:  · Wiping improperly after urinating. Always wipe from front to back.  · Bowel incontinence  · Pregnancy  · Procedures such as having a catheter inserted  · Older age  · Not emptying your bladder. This can allow bacteria a chance to grow in your urine.  · Dehydration  · Constipation  · Sex  · Use of a diaphragm for birth control   Treatment  Bladder infections are diagnosed by a urine test. They are treated with antibiotics and usually clear up quickly without complications. Treatment helps prevent a more serious kidney infection.  Medicines  Medicines can help in the treatment of a bladder infection:  · Take antibiotics until they are used up, even if you feel better. It is important to finish them to make sure the infection has cleared.  · You can use acetaminophen or ibuprofen for pain, fever, or discomfort, unless another medicine was prescribed. If you have chronic liver or kidney disease, talk with your healthcare provider before using these medicines. Also talk with your provider if you've ever had a stomach ulcer or  gastrointestinal bleeding, or are taking blood-thinner medicines.  · If you are given phenazopydridine to reduce burning with urination, it will cause your urine to become a bright orange color. This can stain clothing.  Care and prevention  These self-care steps can help prevent future infections:  · Drink plenty of fluids to prevent dehydration and flush out your bladder. Do this unless you must restrict fluids for other health reasons, or your doctor told you not to.  · Proper cleaning after going to the bathroom is important. Wipe from front to back after using the toilet to prevent the spread of bacteria.  · Urinate more often. Don't try to hold urine in for a long time.  · Wear loose-fitting clothes and cotton underwear. Avoid tight-fitting pants.  · Improve your diet and prevent constipation. Eat more fresh fruit and vegetables, and fiber, and less junk and fatty foods.  · Avoid sex until your symptoms are gone.  · Avoid caffeine, alcohol, and spicy foods. These can irritate your bladder.  · Urinate right after intercourse to flush out your bladder.  · If you use birth control pills and have frequent bladder infections, discuss it with your doctor.  Follow-up care  Call your healthcare provider if all symptoms are not gone after 3 days of treatment. This is especially important if you have repeat infections.  If a culture was done, you will be told if your treatment needs to be changed. If directed, you can call to find out the results.  If X-rays were done, you will be told if the results will affect your treatment.  Call 911  Call 911 if any of the following occur:  · Trouble breathing  · Hard to wake up or confusion  · Fainting or loss of consciousness  · Rapid heart rate  When to seek medical advice  Call your healthcare provider right away if any of these occur:  · Fever of 100.4ºF (38.0ºC) or higher, or as directed by your healthcare provider  · Symptoms are not better by the third day of  treatment  · Back or belly (abdominal) pain that gets worse  · Repeated vomiting, or unable to keep medicine down  · Weakness or dizziness  · Vaginal discharge  · Pain, redness, or swelling in the outer vaginal area (labia)  Date Last Reviewed: 10/1/2016  © 7213-8077 [employer]. [address] [place] [address]. All rights reserved. This information is not intended as a substitute for professional medical care. Always follow your healthcare professional's instructions.      Please drink plenty of fluids.  Please get plenty of rest.  Please return here or go to the Emergency Department for any concerns or worsening of condition.  If you were prescribed a narcotic medication, do not drive or operate heavy equipment or machinery while taking these medications.  If you were not prescribed an anti-inflammatory medication, and if you do not have any history of stomach/intestinal ulcers, or kidney disease, or are not taking a blood thinner such as Coumadin, Plavix, Pradaxa, Eloquis, or Xaralta for example, it is OK to take over the counter Ibuprofen or Advil or Motrin or Aleve as directed.  Do not take these medications on an empty stomach.  Rest, ice, compression and elevation to the affected joint or limb as needed.    If you were given a sling, wear it for comfort until follow up as arranged.  If you were given or placed in a splint, wear it until your follow up visit or recheck.  If you  smoke, please stop smoking.       Please follow up with your primary care doctor or specialist as needed.    Ama Alvarado NP  [phone]     [facility]  [address] [place] [address].  [place].  [id number]  [phone]    Foot Sprain    A sprain is a stretching or tearing of the ligaments that hold a joint together. There are no broken bones. Sprains generally take from 3-6 weeks to heal. A sprain may be treated with a splint, walking cast, or special boot. Mild sprains may not need any  additional support.  Home care  The following guidelines will help you care for your injury at home:  · Keep your leg elevated when sitting or lying down. This is very important during the first 48 hours to reduce swelling. Stay off the injured foot as much as possible until you can walk on it without pain. If needed, you may use crutches during the first week for this purpose. Crutches can be rented at many pharmacies or surgical/orthopedic supply stores.  · You may be given a cast shoe to wear to prevent movement in your foot. If not, you can use a sandal or any shoe that does not put pressure on the injured area until the swelling and pain go away. If using a sandal, be careful not to hit your foot against anything, since another injury could make the sprain worse.  · Apply an ice pack over the injured area for 15 to 20 minutes every 3 to 6 hours. You should do this for the first 24 to 48 hours. You can make an ice pack by filling a plastic bag that seals at the top with ice cubes and then wrapping it with a thin towel. Continue to use ice packs for relief of pain and swelling as needed. As the ice melts, avoid getting any wrap, splint, or cast wet. After 48 hours, apply heat from a warm shower or bath for 20 minutes several times daily. Alternating ice and heat may also be helpful.  · You may use over-the-counter pain medicine to control pain, unless another medicine was prescribed. If you have chronic liver or kidney disease or ever had a stomach ulcer or GI bleeding, talk with your healthcare provider before using these medicines.  · If you were given a splint or cast, keep it dry. Bathe with your splint or cast well out of the water, protected with 2 large plastic bags, rubber-banded at the top end. If a fiberglass splint or cast gets wet, you can dry it with a hair dryer.  · You may return to sports after healing, when you can run without pain.  Follow-up care  Follow up with your healthcare provider as  directed. Sometimes fractures dont show up on the first X-ray. Bruises and sprains can sometimes hurt as much as a fracture. These injuries can take time to heal completely. If your symptoms dont improve or they get worse, talk with your healthcare provider. You may need a repeat X-ray.  When to seek medical advice  Call your healthcare provider right away if any of these occur:  · The plaster cast or splint gets wet or soft  · The fiberglass cast or splint gets wet and does not dry for 24 hours  · Pain or swelling increases, or redness appears  · A bad odor comes from within the cast  · Fever of 100.4°F (38°C) or above lasting for 24 to 48 hours  · Toes on the injured foot become cold, blue, numb, or tingly  Date Last Reviewed: 11/20/2015  © 7200-4771 [employer]. [address] [place], [place] [id number]. All rights reserved. This information is not intended as a substitute for professional medical care. Always follow your healthcare professional's instructions.

## 2018-02-25 NOTE — PROGRESS NOTES
"Subjective:       Patient ID: Abbey Vargas is a 67 y.o. female.    Vitals:  height is 5' 4" (1.626 m) and weight is 79.8 kg (176 lb). Her oral temperature is 100.9 °F (38.3 °C) (abnormal). Her blood pressure is 120/82 and her pulse is 108. Her oxygen saturation is 97%.     Chief Complaint: Mass (on foot)    Pt has a lump on the outside of her left foot. No trauma.      Mass   This is a new problem. Episode onset: two days. The problem occurs constantly. The problem has been gradually worsening. Associated symptoms include a fever (102.5), joint swelling and numbness. Pertinent negatives include no abdominal pain, chest pain, chills, headaches, nausea, rash, sore throat or vomiting. Exacerbated by: hurts to touch. She has tried nothing for the symptoms.     Review of Systems   Constitution: Positive for fever (102.5). Negative for chills.   HENT: Negative for sore throat.    Eyes: Negative for blurred vision.   Cardiovascular: Negative for chest pain.   Respiratory: Negative for shortness of breath.    Skin: Negative for rash.   Musculoskeletal: Positive for joint swelling. Negative for back pain and joint pain.   Gastrointestinal: Negative for abdominal pain, diarrhea, nausea and vomiting.   Neurological: Positive for numbness. Negative for headaches.   Psychiatric/Behavioral: The patient is not nervous/anxious.        Objective:      Physical Exam   Constitutional: She is oriented to person, place, and time. She appears well-developed and well-nourished. She is cooperative.  Non-toxic appearance. She does not appear ill. No distress.   HENT:   Head: Normocephalic and atraumatic. Head is without abrasion, without contusion and without laceration.   Right Ear: Hearing, tympanic membrane, external ear and ear canal normal. No hemotympanum.   Left Ear: Hearing, tympanic membrane, external ear and ear canal normal. No hemotympanum.   Nose: Nose normal. No mucosal edema, rhinorrhea or nasal deformity. No " epistaxis. Right sinus exhibits no maxillary sinus tenderness and no frontal sinus tenderness. Left sinus exhibits no maxillary sinus tenderness and no frontal sinus tenderness.   Mouth/Throat: Uvula is midline, oropharynx is clear and moist and mucous membranes are normal. No trismus in the jaw. Normal dentition. No uvula swelling. No posterior oropharyngeal erythema.   Eyes: Conjunctivae, EOM and lids are normal. Pupils are equal, round, and reactive to light. Right eye exhibits no discharge. Left eye exhibits no discharge. No scleral icterus.   Sclera clear bilat   Neck: Trachea normal, normal range of motion, full passive range of motion without pain and phonation normal. Neck supple. No spinous process tenderness and no muscular tenderness present. No neck rigidity. No tracheal deviation present.   Cardiovascular: Normal rate, regular rhythm, normal heart sounds, intact distal pulses and normal pulses.    Pulmonary/Chest: Effort normal and breath sounds normal. No respiratory distress.   Abdominal: Soft. Normal appearance and bowel sounds are normal. She exhibits no distension, no pulsatile midline mass and no mass. There is no tenderness. There is no CVA tenderness.   Musculoskeletal: Normal range of motion. She exhibits no edema or deformity.        Left foot: There is tenderness and swelling.        Feet:    Neurological: She is alert and oriented to person, place, and time. She has normal strength. No cranial nerve deficit or sensory deficit. She exhibits normal muscle tone. She displays no seizure activity. Coordination normal. GCS eye subscore is 4. GCS verbal subscore is 5. GCS motor subscore is 6.   Skin: Skin is warm, dry and intact. Capillary refill takes less than 2 seconds. No abrasion, no bruising, no burn, no ecchymosis and no laceration noted. She is not diaphoretic. No pallor.   Psychiatric: She has a normal mood and affect. Her speech is normal and behavior is normal. Judgment and thought  content normal. Cognition and memory are normal.   Nursing note and vitals reviewed.    POCT Urinalysis, Dipstick, Automated, W/O Scope   Order: [id number]   Status:  Final result   Visible to patient:  No (Not Released) Next appt:  03/07/2018 at 10:40 AM in Family Medicine (Ama Alvarado NP) Dx:  Fever, unspecified fever cause    Ref Range & Units 17:13   POC Blood, Urine Negative Negative    POC Bilirubin, Urine Negative Negative    POC Urobilinogen, Urine 0.1 - 1.1    POC Ketones, Urine Negative Negative    POC Protein, Urine Negative Negative    POC Nitrates, Urine Negative Negative    POC Glucose, Urine Negative Negative    pH, UA 5 - 8 6.0 (NG)    POC Specific Gravity, Urine 1.003 - 1.029 1.015 (NG)    POC Leukocytes, Urine Negative Positive     Comments: 25   Resulting Agency  [facility]      Specimen Collected: 02/25/18 17:13 Last Resulted: 02/25/18 17:13              Type of Interpretation: ED Physician (Independently Interpreted).  Radiology Procedure Done: Left Foot.  Interpretation: No fx, arthritis changes, bunion.        Assessment:       1. Fever, unspecified fever cause    2. Left foot pain    3. Urinary tract infection with hematuria, site unspecified        Plan:         Fever, unspecified fever cause  -     POCT Urinalysis, Dipstick, Automated, W/O Scope    Left foot pain  -     X-Ray Foot Complete Left; Future; Expected date: 02/25/2018  -     Ambulatory referral to Podiatry    Urinary tract infection with hematuria, site unspecified    Other orders  -     ciprofloxacin HCl (CIPRO) 500 MG tablet; Take 1 tablet (500 mg total) by mouth 2 (two) times daily.  Dispense: 20 tablet; Refill: 0  -     naproxen (NAPROSYN) 500 MG tablet; Take 1 tablet (500 mg total) by mouth 2 (two) times daily with meals.  Dispense: 20 tablet; Refill: 0      Please return here or go to the Emergency Department for any concerns or worsening of condition.  If you were prescribed antibiotics, please take them to completion.  If  you were prescribed a narcotic medication, do not drive or operate heavy equipment or machinery while taking these medications.  Please follow up with your primary care doctor or specialist as needed.  Please drink plenty of fluids.  Please get plenty of rest.  If you were prescribed Pyridium (phenazopyridine), please be aware that if you wear contact lens that this medication may stain your contacts.  While taking this medication it is recommended that you do not wear your contacts until 24 hours after your last dose.    Push fluids aggressively to improve symptoms and wash through the infection.  Cranberry juice can help relieve symptoms  If you  smoke, please stop smoking.    Please follow up with your primary care doctor or specialist as needed.    Ama Alvarado, YONIS  [phone]    Please drink plenty of fluids.  Please get plenty of rest.  Please return here or go to the Emergency Department for any concerns or worsening of condition.  If you were prescribed a narcotic medication, do not drive or operate heavy equipment or machinery while taking these medications.  If you were not prescribed an anti-inflammatory medication, and if you do not have any history of stomach/intestinal ulcers, or kidney disease, or are not taking a blood thinner such as Coumadin, Plavix, Pradaxa, Eloquis, or Xaralta for example, it is OK to take over the counter Ibuprofen or Advil or Motrin or Aleve as directed.  Do not take these medications on an empty stomach.  Rest, ice, compression and elevation to the affected joint or limb as needed.    If you were given a sling, wear it for comfort until follow up as arranged.  If you were given or placed in a splint, wear it until your follow up visit or recheck.  If you  smoke, please stop smoking.       Please follow up with your primary care doctor or specialist as needed.    Ama Alvarado, YONIS  [phone]      Podiatry - [facility]  [address]  [address]  [phone]

## 2018-03-20 ENCOUNTER — OFFICE VISIT (OUTPATIENT)
Dept: ORTHOPEDICS | Facility: CLINIC | Age: 68
End: 2018-03-20
Payer: MEDICARE

## 2018-03-20 VITALS — BODY MASS INDEX: 29.67 KG/M2 | WEIGHT: 173.81 LBS | HEIGHT: 64 IN

## 2018-03-20 DIAGNOSIS — Z96.651 STATUS POST TOTAL RIGHT KNEE REPLACEMENT: Primary | ICD-10-CM

## 2018-03-20 PROCEDURE — 99999 PR PBB SHADOW E&M-EST. PATIENT-LVL II: CPT | Mod: PBBFAC,,, | Performed by: ORTHOPAEDIC SURGERY

## 2018-03-20 PROCEDURE — 99024 POSTOP FOLLOW-UP VISIT: CPT | Mod: S$GLB,,, | Performed by: ORTHOPAEDIC SURGERY

## 2018-03-20 RX ORDER — ASPIRIN 81 MG/1
81 TABLET ORAL DAILY
COMMUNITY

## 2018-04-10 ENCOUNTER — PATIENT MESSAGE (OUTPATIENT)
Dept: RESEARCH | Facility: HOSPITAL | Age: 68
End: 2018-04-10

## 2018-04-13 ENCOUNTER — PATIENT MESSAGE (OUTPATIENT)
Dept: ORTHOPEDICS | Facility: CLINIC | Age: 68
End: 2018-04-13

## 2018-06-12 PROBLEM — H26.492 POSTERIOR CAPSULAR OPACIFICATION NON VISUALLY SIGNIFICANT OF LEFT EYE: Status: ACTIVE | Noted: 2018-06-12

## 2018-08-14 ENCOUNTER — OFFICE VISIT (OUTPATIENT)
Dept: URGENT CARE | Facility: CLINIC | Age: 68
End: 2018-08-14
Payer: MEDICARE

## 2018-08-14 VITALS
HEART RATE: 68 BPM | BODY MASS INDEX: 29.53 KG/M2 | HEIGHT: 64 IN | DIASTOLIC BLOOD PRESSURE: 73 MMHG | TEMPERATURE: 99 F | OXYGEN SATURATION: 98 % | SYSTOLIC BLOOD PRESSURE: 136 MMHG | WEIGHT: 173 LBS

## 2018-08-14 DIAGNOSIS — N76.0 ACUTE VAGINITIS: Primary | ICD-10-CM

## 2018-08-14 DIAGNOSIS — R30.0 DYSURIA: ICD-10-CM

## 2018-08-14 LAB
BILIRUB UR QL STRIP: NEGATIVE
GLUCOSE UR QL STRIP: NEGATIVE
KETONES UR QL STRIP: NEGATIVE
LEUKOCYTE ESTERASE UR QL STRIP: NEGATIVE
PH, POC UA: 6 (ref 5–8)
POC BLOOD, URINE: NEGATIVE
POC NITRATES, URINE: NEGATIVE
PROT UR QL STRIP: NEGATIVE
SP GR UR STRIP: 1.01 (ref 1–1.03)
UROBILINOGEN UR STRIP-ACNC: NORMAL (ref 0.1–1.1)

## 2018-08-14 PROCEDURE — 81003 URINALYSIS AUTO W/O SCOPE: CPT | Mod: QW,S$GLB,, | Performed by: PHYSICIAN ASSISTANT

## 2018-08-14 PROCEDURE — 3075F SYST BP GE 130 - 139MM HG: CPT | Mod: CPTII,S$GLB,, | Performed by: PHYSICIAN ASSISTANT

## 2018-08-14 PROCEDURE — 99214 OFFICE O/P EST MOD 30 MIN: CPT | Mod: 25,S$GLB,, | Performed by: PHYSICIAN ASSISTANT

## 2018-08-14 PROCEDURE — 3078F DIAST BP <80 MM HG: CPT | Mod: CPTII,S$GLB,, | Performed by: PHYSICIAN ASSISTANT

## 2018-08-14 RX ORDER — NYSTATIN 100000 U/G
CREAM TOPICAL 2 TIMES DAILY
Qty: 30 G | Refills: 0 | Status: SHIPPED | OUTPATIENT
Start: 2018-08-14 | End: 2019-03-22

## 2018-08-14 RX ORDER — TRIAMCINOLONE ACETONIDE 1 MG/G
CREAM TOPICAL 2 TIMES DAILY
Qty: 45 G | Refills: 0 | Status: SHIPPED | OUTPATIENT
Start: 2018-08-14 | End: 2019-03-22

## 2018-08-15 NOTE — PATIENT INSTRUCTIONS
1.  Take all medications as directed. If you have been prescribed antibiotics, make sure to complete them.   2.  Rest and keep yourself/patient well hydrated. For adults, it is recommended to drink at least 8-10 glasses of water daily.   3.  For patients above 6 months of age who are not allergic to and are not on anticoagulants, you can alternate Tylenol and Motrin every 4-6 hours for fever above 100.4F and/or pain.  For patients less than 6 months of age, allergic to or intolerant to NSAIDS, have gastritis, gastric ulcers, or history of GI bleeds, are pregnant, or are on anticoagulant therapy, you can take Tylenol every 4 hours as needed for fever above 100.4F and/or pain.   4. You should schedule a follow-up appointment with your Primary Care Provider/Pediatrician for recheck in 2-3 days or as directed at this visit.   5.  If your condition fails to improve in a timely manner, you should receive another evaluation by your Primary Care Provider/Pediatrician to discuss your concerns or return to urgent care for a recheck.  If your condition worsens at any time, you should report immediately to your nearest Emergency Department for further evaluation. **You must understand that you have received Urgent Care treatment only and that you may be released before all of your medical problems are known or treated. You, the patient, are responsible to arrange for follow-up care as instructed.         Atrophic Vaginitis    Atrophic vaginitis means the walls of your vagina have become thin. This happens when your body makes too little of the hormone estrogen. Menopause or surgical removal of the ovaries are the most common causes for a drop in estrogen. Breastfeeding can also cause the hormone level to drop.  Symptoms of atrophic vaginitis include:  · Dryness, soreness, burning, or itching in the vagina  · Vaginal discharge  Sex can be uncomfortable, even painful. After sex, you may have bleeding from your vagina. You may  also have burning or pain when you urinate.  Home care  Your healthcare provider may recommend 1 or more of these as treatment:  · Vaginal creams, lotions, and lubricants. These products help relieve vaginal dryness. They dont need a prescription. They can be found in the personal care section of most pharmacies. Creams and lotions are used daily to help keep the vagina moist. Lubricants help reduce dryness and pain during sex. Choose water-based lubricants. Dont use petroleum jelly, mineral oil, or other oils. These increase the chance of infection.   · Hormone therapy (HT). HT increases the amount of estrogen in your body. This can help manage or relieve symptoms. HT can be given in pill form. It may be given as a lotion, cream, ring put into the vagina, or a patch on the skin. The risks and benefits of HT vary for each woman. For instance, your risk may be higher if you have had breast cancer. Discuss this treatment with your healthcare provider. Not every woman can use HT.  You dont need to give up (abstain from) sex. In fact, regular sex can help keep vaginal tissues healthy. Take steps to make sex more comfortable by using water-based lubricants.  Preventing infections  Atrophic vaginitis makes an infection of the vagina or the urinary tract more likely. To help reduce your risk:  · Keep your genitals clean. When you bathe, wash the outside of your vagina with mild soap and water. Clean gently between the folds of your vagina.  · Wipe from front to back after a bowel movement.  · Dont douche unless your healthcare provider tells you to.  · Avoid scented toilet paper, scented vaginal sprays, and scented tampons.  · Avoid wearing clothes that are tight in the crotch. These include pantyhose, jeans, and leggings. Wear cotton underwear. Change it every day.  Follow-up care  Follow up with your healthcare provider, or as advised.  When to seek medical advice  Call your health care provider right away if any of  these occur:  · Fever of 100.4°F (38°C) or higher, or as directed by your healthcare provider  · Symptoms dont go away or get worse even with treatment  · Vaginal area swells or becomes painful  · Vaginal area bleeds, but not because of your period  · Bad-smelling discharge from the vagina  · Pain or burning when you urinate or you have trouble passing urine  · Open sores develop around vagina   Date Last Reviewed: 12/1/2016  © 4223-9378 [employer]. [address] [place] [address], [place] [address]. All rights reserved. This information is not intended as a substitute for professional medical care. Always follow your healthcare professional's instructions.

## 2018-08-15 NOTE — PROGRESS NOTES
"Subjective:       Patient ID: Abbey Vargas is a 67 y.o. female.    Vitals:  height is 5' 4" (1.626 m) and weight is 78.5 kg (173 lb). Her oral temperature is 98.7 °F (37.1 °C). Her blood pressure is 136/73 and her pulse is 68. Her oxygen saturation is 98%.     Chief Complaint: Urinary Tract Infection    Patient complains of dysuria, urgency, and frequency for the past 3 days.  She also reports "vaginal irritation" for the past week.  She denies vaginal discharge or vaginal itching.  She also denies fever and chills.  Patient denies any other complaints at this time.        Urinary Tract Infection    This is a new problem. Episode onset: 1 week ago. The problem occurs intermittently. The problem has been gradually worsening. The quality of the pain is described as burning. The pain is at a severity of 3/10. The pain is mild. There has been no fever. Associated symptoms include frequency and urgency. Pertinent negatives include no chills, discharge, flank pain, hematuria, nausea or vomiting. Treatments tried: ad ointment. The treatment provided no relief. There is no history of hypertension.     Review of Systems   Constitution: Negative for chills and fever.   Cardiovascular: Negative for chest pain.   Respiratory: Negative for shortness of breath.    Skin: Negative for itching.   Musculoskeletal: Negative for back pain.   Gastrointestinal: Negative for abdominal pain, diarrhea, nausea and vomiting.   Genitourinary: Positive for dysuria, frequency and urgency. Negative for flank pain, genital sores, hematuria, missed menses and non-menstrual bleeding.        Vaginal irritation   All other systems reviewed and are negative.      Objective:      Physical Exam   Constitutional: She is oriented to person, place, and time. She appears well-developed and well-nourished.   HENT:   Head: Normocephalic and atraumatic.   Right Ear: External ear normal.   Left Ear: External ear normal.   Nose: Nose normal. No nasal " deformity. No epistaxis.   Mouth/Throat: Oropharynx is clear and moist and mucous membranes are normal.   Eyes: Conjunctivae and lids are normal.   Neck: Trachea normal, normal range of motion and phonation normal. Neck supple.   Cardiovascular: Normal rate, regular rhythm, normal heart sounds and normal pulses.   Pulmonary/Chest: Effort normal and breath sounds normal.   Abdominal: Soft. Normal appearance and bowel sounds are normal. She exhibits no distension and no mass. There is no tenderness. There is no CVA tenderness.   Genitourinary:       There is no rash, tenderness, lesion or injury on the right labia. There is no rash, tenderness, lesion or injury on the left labia.   Neurological: She is alert and oriented to person, place, and time.   Skin: Skin is warm, dry and intact.   Psychiatric: She has a normal mood and affect. Her speech is normal and behavior is normal. Cognition and memory are normal.   Nursing note and vitals reviewed.      Results for orders placed or performed in visit on 08/14/18   POCT Urinalysis, Dipstick, Automated, W/O Scope   Result Value Ref Range    POC Blood, Urine Negative Negative    POC Bilirubin, Urine Negative Negative    POC Urobilinogen, Urine Normal 0.1 - 1.1    POC Ketones, Urine Negative Negative    POC Protein, Urine Negative Negative    POC Nitrates, Urine Negative Negative    POC Glucose, Urine Negative Negative    pH, UA 6.0 5 - 8    POC Specific Gravity, Urine 1.010 1.003 - 1.029    POC Leukocytes, Urine Negative Negative       Assessment:       1. Acute vaginitis    2. Dysuria        Plan:         Acute vaginitis  -     nystatin (MYCOSTATIN) cream; Apply topically 2 (two) times daily.  Dispense: 30 g; Refill: 0  -     triamcinolone acetonide 0.1% (KENALOG) 0.1 % cream; Apply topically 2 (two) times daily. for 7 days  Dispense: 45 g; Refill: 0    Dysuria  -     POCT Urinalysis, Dipstick, Automated, W/O Scope    Patient's UA is clear.  There is no evidence of  infection at this time.  Patient to follow-up with PCP/OB/GYN within the next week for further evaluation/treatment of what appears to be atrophic vaginitis.     Patient Instructions   1.  Take all medications as directed. If you have been prescribed antibiotics, make sure to complete them.   2.  Rest and keep yourself/patient well hydrated. For adults, it is recommended to drink at least 8-10 glasses of water daily.   3.  For patients above 6 months of age who are not allergic to and are not on anticoagulants, you can alternate Tylenol and Motrin every 4-6 hours for fever above 100.4F and/or pain.  For patients less than 6 months of age, allergic to or intolerant to NSAIDS, have gastritis, gastric ulcers, or history of GI bleeds, are pregnant, or are on anticoagulant therapy, you can take Tylenol every 4 hours as needed for fever above 100.4F and/or pain.   4. You should schedule a follow-up appointment with your Primary Care Provider/Pediatrician for recheck in 2-3 days or as directed at this visit.   5.  If your condition fails to improve in a timely manner, you should receive another evaluation by your Primary Care Provider/Pediatrician to discuss your concerns or return to urgent care for a recheck.  If your condition worsens at any time, you should report immediately to your nearest Emergency Department for further evaluation. **You must understand that you have received Urgent Care treatment only and that you may be released before all of your medical problems are known or treated. You, the patient, are responsible to arrange for follow-up care as instructed.         Atrophic Vaginitis    Atrophic vaginitis means the walls of your vagina have become thin. This happens when your body makes too little of the hormone estrogen. Menopause or surgical removal of the ovaries are the most common causes for a drop in estrogen. Breastfeeding can also cause the hormone level to drop.  Symptoms of atrophic vaginitis  include:  · Dryness, soreness, burning, or itching in the vagina  · Vaginal discharge  Sex can be uncomfortable, even painful. After sex, you may have bleeding from your vagina. You may also have burning or pain when you urinate.  Home care  Your healthcare provider may recommend 1 or more of these as treatment:  · Vaginal creams, lotions, and lubricants. These products help relieve vaginal dryness. They dont need a prescription. They can be found in the personal care section of most pharmacies. Creams and lotions are used daily to help keep the vagina moist. Lubricants help reduce dryness and pain during sex. Choose water-based lubricants. Dont use petroleum jelly, mineral oil, or other oils. These increase the chance of infection.   · Hormone therapy (HT). HT increases the amount of estrogen in your body. This can help manage or relieve symptoms. HT can be given in pill form. It may be given as a lotion, cream, ring put into the vagina, or a patch on the skin. The risks and benefits of HT vary for each woman. For instance, your risk may be higher if you have had breast cancer. Discuss this treatment with your healthcare provider. Not every woman can use HT.  You dont need to give up (abstain from) sex. In fact, regular sex can help keep vaginal tissues healthy. Take steps to make sex more comfortable by using water-based lubricants.  Preventing infections  Atrophic vaginitis makes an infection of the vagina or the urinary tract more likely. To help reduce your risk:  · Keep your genitals clean. When you bathe, wash the outside of your vagina with mild soap and water. Clean gently between the folds of your vagina.  · Wipe from front to back after a bowel movement.  · Dont douche unless your healthcare provider tells you to.  · Avoid scented toilet paper, scented vaginal sprays, and scented tampons.  · Avoid wearing clothes that are tight in the crotch. These include pantyhose, jeans, and leggings. Wear cotton  underwear. Change it every day.  Follow-up care  Follow up with your healthcare provider, or as advised.  When to seek medical advice  Call your health care provider right away if any of these occur:  · Fever of 100.4°F (38°C) or higher, or as directed by your healthcare provider  · Symptoms dont go away or get worse even with treatment  · Vaginal area swells or becomes painful  · Vaginal area bleeds, but not because of your period  · Bad-smelling discharge from the vagina  · Pain or burning when you urinate or you have trouble passing urine  · Open sores develop around vagina   Date Last Reviewed: 12/1/2016  © 0062-6599 [employer]. [address] [place] [address], [place] [address]. All rights reserved. This information is not intended as a substitute for professional medical care. Always follow your healthcare professional's instructions.

## 2018-10-16 ENCOUNTER — PATIENT MESSAGE (OUTPATIENT)
Dept: ORTHOPEDICS | Facility: CLINIC | Age: 68
End: 2018-10-16

## 2018-10-16 DIAGNOSIS — Z96.651 STATUS POST TOTAL RIGHT KNEE REPLACEMENT: Primary | ICD-10-CM

## 2018-10-16 RX ORDER — AMOXICILLIN 500 MG/1
TABLET, FILM COATED ORAL
Qty: 4 TABLET | Refills: 3 | Status: SHIPPED | OUTPATIENT
Start: 2018-10-16 | End: 2019-03-22

## 2018-11-09 ENCOUNTER — PATIENT MESSAGE (OUTPATIENT)
Dept: ORTHOPEDICS | Facility: CLINIC | Age: 68
End: 2018-11-09

## 2019-03-07 ENCOUNTER — LAB VISIT (OUTPATIENT)
Dept: LAB | Facility: HOSPITAL | Age: 69
End: 2019-03-07
Attending: NURSE PRACTITIONER
Payer: MEDICARE

## 2019-03-07 DIAGNOSIS — Z12.11 COLON CANCER SCREENING: ICD-10-CM

## 2019-03-07 LAB — HEMOCCULT STL QL IA: NEGATIVE

## 2019-03-07 PROCEDURE — 82274 ASSAY TEST FOR BLOOD FECAL: CPT

## 2019-07-17 ENCOUNTER — TELEPHONE (OUTPATIENT)
Dept: ADMINISTRATIVE | Facility: HOSPITAL | Age: 69
End: 2019-07-17

## 2019-08-26 ENCOUNTER — PATIENT MESSAGE (OUTPATIENT)
Dept: ORTHOPEDICS | Facility: CLINIC | Age: 69
End: 2019-08-26

## 2019-08-27 ENCOUNTER — OFFICE VISIT (OUTPATIENT)
Dept: ORTHOPEDICS | Facility: CLINIC | Age: 69
End: 2019-08-27
Payer: MEDICARE

## 2019-08-27 ENCOUNTER — HOSPITAL ENCOUNTER (OUTPATIENT)
Dept: RADIOLOGY | Facility: HOSPITAL | Age: 69
Discharge: HOME | End: 2019-08-27
Attending: ORTHOPAEDIC SURGERY
Payer: MEDICARE

## 2019-08-27 VITALS — WEIGHT: 180.44 LBS | BODY MASS INDEX: 31.97 KG/M2 | HEIGHT: 63 IN

## 2019-08-27 DIAGNOSIS — M25.562 LEFT KNEE PAIN, UNSPECIFIED CHRONICITY: Primary | ICD-10-CM

## 2019-08-27 DIAGNOSIS — M17.12 PRIMARY OSTEOARTHRITIS OF LEFT KNEE: ICD-10-CM

## 2019-08-27 DIAGNOSIS — M25.562 LEFT KNEE PAIN, UNSPECIFIED CHRONICITY: ICD-10-CM

## 2019-08-27 PROCEDURE — 73560 X-RAY EXAM OF KNEE 1 OR 2: CPT | Mod: 26,59,RT, | Performed by: RADIOLOGY

## 2019-08-27 PROCEDURE — 1101F PT FALLS ASSESS-DOCD LE1/YR: CPT | Mod: CPTII,S$GLB,, | Performed by: ORTHOPAEDIC SURGERY

## 2019-08-27 PROCEDURE — 99214 OFFICE O/P EST MOD 30 MIN: CPT | Mod: 25,S$GLB,, | Performed by: ORTHOPAEDIC SURGERY

## 2019-08-27 PROCEDURE — 20610 DRAIN/INJ JOINT/BURSA W/O US: CPT | Mod: LT,S$GLB,, | Performed by: ORTHOPAEDIC SURGERY

## 2019-08-27 PROCEDURE — 20610 LARGE JOINT ASPIRATION/INJECTION: L KNEE: ICD-10-PCS | Mod: LT,S$GLB,, | Performed by: ORTHOPAEDIC SURGERY

## 2019-08-27 PROCEDURE — 73562 X-RAY EXAM OF KNEE 3: CPT | Mod: TC,LT

## 2019-08-27 PROCEDURE — 99999 PR PBB SHADOW E&M-EST. PATIENT-LVL III: ICD-10-PCS | Mod: PBBFAC,,, | Performed by: ORTHOPAEDIC SURGERY

## 2019-08-27 PROCEDURE — 1101F PR PT FALLS ASSESS DOC 0-1 FALLS W/OUT INJ PAST YR: ICD-10-PCS | Mod: CPTII,S$GLB,, | Performed by: ORTHOPAEDIC SURGERY

## 2019-08-27 PROCEDURE — 73562 X-RAY EXAM OF KNEE 3: CPT | Mod: 26,LT,, | Performed by: RADIOLOGY

## 2019-08-27 PROCEDURE — 73560 X-RAY EXAM OF KNEE 1 OR 2: CPT | Mod: 59,TC,RT

## 2019-08-27 PROCEDURE — 99214 PR OFFICE/OUTPT VISIT, EST, LEVL IV, 30-39 MIN: ICD-10-PCS | Mod: 25,S$GLB,, | Performed by: ORTHOPAEDIC SURGERY

## 2019-08-27 PROCEDURE — 73560 XR KNEE ORTHO LEFT: ICD-10-PCS | Mod: 26,59,RT, | Performed by: RADIOLOGY

## 2019-08-27 PROCEDURE — 73562 XR KNEE ORTHO LEFT: ICD-10-PCS | Mod: 26,LT,, | Performed by: RADIOLOGY

## 2019-08-27 PROCEDURE — 99999 PR PBB SHADOW E&M-EST. PATIENT-LVL III: CPT | Mod: PBBFAC,,, | Performed by: ORTHOPAEDIC SURGERY

## 2019-08-27 RX ORDER — TRIAMCINOLONE ACETONIDE 40 MG/ML
60 INJECTION, SUSPENSION INTRA-ARTICULAR; INTRAMUSCULAR
Status: DISCONTINUED | OUTPATIENT
Start: 2019-08-27 | End: 2019-08-27 | Stop reason: HOSPADM

## 2019-08-27 RX ADMIN — TRIAMCINOLONE ACETONIDE 60 MG: 40 INJECTION, SUSPENSION INTRA-ARTICULAR; INTRAMUSCULAR at 10:08

## 2019-08-27 NOTE — PROGRESS NOTES
HPI:    Abbey Vargas is a 69 y.o. female who is here today for   Chief Complaint   Patient presents with    Left Knee - Pain   .  Patient has a recent right total knee and has done very well.  She was out doing yd work and the left knee blew up on her.  She has had pain swelling and difficulty weight-bearing.      Duration: 2 months  Intensity: severe  Character of pain: sharp  Location: She reports that the pain is predominately  medial    Past Medical History:   Diagnosis Date    Arthritis     Carpal tunnel syndrome of right wrist 2000    Care for it here @ [facility]    CKD (chronic kidney disease) stage 3, GFR 30-59 ml/min 1/30/2018    Depression     Hypertension     Hypoactive thyroid     Muscle pain     Ptosis     Sleep apnea 2000    Care here at [facility] on CPAP          Current Outpatient Medications:     aspirin (ECOTRIN) 81 MG EC tablet, Take 81 mg by mouth once daily., Disp: , Rfl:     BIOTIN ORAL, Take 1 capsule by mouth once daily. , Disp: , Rfl:     calcium-vitamin D3 (CALCIUM 500 + D) 500 mg(1,250mg) -200 unit per tablet, Take 1 tablet by mouth 2 (two) times daily with meals., Disp: , Rfl:     fish oil-omega-3 fatty acids 300-1,000 mg capsule, Take 2 g by mouth once daily., Disp: , Rfl:     levothyroxine (SYNTHROID) 100 MCG tablet, TAKE 1 TABLET EVERY DAY, Disp: 90 tablet, Rfl: 3    lisinopril 10 MG tablet, TAKE 1 TABLET BY MOUTH EVERY DAY IN THE EVENING, Disp: 90 tablet, Rfl: 3    multivitamin (THERAGRAN) per tablet, Take 1 tablet by mouth once daily., Disp: , Rfl:     pravastatin (PRAVACHOL) 80 MG tablet, Take 1 tablet (80 mg total) by mouth every evening., Disp: 90 tablet, Rfl: 3     Review of patient's allergies indicates:  No Known Allergies     ROS  Constitutional: Negative for fever, Negative for weight loss  HENT Negative for congestion  Cardiovascular: Negative chest pain  Respiratory: Negative Shortness of breath  Heme: Negative excessive bleeding  Skin:NegativeItching, Negative  "breakdown  Musculoskeletal:Negative for back pain, Positive for joint pain, Positive muscle pain, Positive muscle weakness  Neurological: Negative for numbness and paresthesias   Psychiatric/Behavioral: Negative altered mental status, Negative for depression    Physical Exam:   Ht 5' 3" (1.6 m)   Wt 81.8 kg (180 lb 7.1 oz)   BMI 31.96 kg/m²   General appearance: This is a well-developed, Well nourished female No obvious acute distress.  Psychiatric: normal mood and affect;  pleasant and conversant; judgment and thought content normal  Gait is coordinated. Patient has antalgic gait to the left  Cardiovascular: There are no swelling or varicosities present.   Respiratory: normal respiratory effort   Lymphatic: no adenopathy   Neurologic: alert and oriented to person, place, and time   Deep Tendon Reflexes are normal;  Coordination and Balance: Normal   Musculoskeletal   Neck    ROM shows normal flexion and extension and lateral rotation    Palpation: Non-tender    Stability is normal    Strength is normal    Skin is normal without masses and lesions    Sensation is intact to light touch   Back    ROM showsnormal flexion, extension    and  rotation    Palpation shows no masses    Stability is normal    Strength to flexion and extension well maintained    Core strength is diminished    Skin shows no rashes or cafe au lait spots;     Sensation is intact to light touch    Right Knee  Swelling None  TendernessNone  Range of Motion: 120    Left Knee: Swelling Moderate  TendernessMedial joint line  Range of Motion: 10-90    Radiograph   Osteoarthritis: mild  Angle: mild varus  Patient has chondrocalcinosis.  Joint space is still relatively well maintained.    Assessment:  Chondrocalcinosis and possible degenerative medial meniscal tear.      Plan:  Have aspirated 15 cc of normal-appearing joint fluid and injected the knee.  This gets her over the episode then that will be fine.  If she continues to have pain would consider " doing MRI and possible evaluation for arthroscopy.

## 2019-08-27 NOTE — PROCEDURES
Large Joint Aspiration/Injection: L knee  Date/Time: 8/27/2019 10:38 AM  Performed by: John L. Ochsner Jr., MD  Authorized by: John L. Ochsner Jr., MD     Consent Done?:  Yes (Verbal)  Indications:  Pain  Procedure site marked: Yes    Timeout: Prior to procedure the correct patient, procedure, and site was verified      Location:  Knee  Site:  L knee  Prep: Patient was prepped and draped in usual sterile fashion    Needle size:  18 G  Ultrasonic Guidance for needle placement: No  Approach:  Anterolateral  Medications:  60 mg triamcinolone acetonide 40 mg/mL  Aspirate amount (ml):  15  Aspirate:  Yellow  Patient tolerance:  Patient tolerated the procedure well with no immediate complications

## 2020-02-19 ENCOUNTER — PATIENT MESSAGE (OUTPATIENT)
Dept: ORTHOPEDICS | Facility: CLINIC | Age: 70
End: 2020-02-19

## 2020-02-19 DIAGNOSIS — Z96.651 STATUS POST TOTAL RIGHT KNEE REPLACEMENT: Primary | ICD-10-CM

## 2020-02-19 RX ORDER — AMOXICILLIN 500 MG/1
TABLET, FILM COATED ORAL
Qty: 4 TABLET | Refills: 3 | Status: SHIPPED | OUTPATIENT
Start: 2020-02-19 | End: 2020-05-18

## 2020-04-21 PROBLEM — R13.10 SWALLOWING IMPAIRED: Status: ACTIVE | Noted: 2020-04-21

## 2020-05-18 PROBLEM — M75.101 TEAR OF RIGHT SUPRASPINATUS TENDON: Status: ACTIVE | Noted: 2020-05-18

## 2020-05-25 PROBLEM — S46.811A TRAUMATIC TEAR OF SUPRASPINATUS TENDON OF RIGHT SHOULDER: Status: ACTIVE | Noted: 2020-05-25

## 2020-05-25 PROBLEM — M25.512 ACUTE PAIN OF LEFT SHOULDER: Status: ACTIVE | Noted: 2020-05-25

## 2020-07-02 DIAGNOSIS — Z96.659 STATUS POST KNEE REPLACEMENT, UNSPECIFIED LATERALITY: Primary | ICD-10-CM

## 2020-07-02 RX ORDER — AMOXICILLIN 500 MG/1
2000 CAPSULE ORAL
Qty: 4 CAPSULE | Refills: 0 | Status: SHIPPED | OUTPATIENT
Start: 2020-07-02 | End: 2020-08-19

## 2020-07-08 PROBLEM — Z80.0 FAMILY HX OF COLON CANCER: Status: ACTIVE | Noted: 2020-07-08

## 2020-08-31 PROBLEM — M75.100 ROTATOR CUFF TEAR: Status: ACTIVE | Noted: 2020-08-31

## 2020-10-13 ENCOUNTER — OFFICE VISIT (OUTPATIENT)
Dept: URGENT CARE | Facility: CLINIC | Age: 70
End: 2020-10-13
Payer: MEDICARE

## 2020-10-13 VITALS
OXYGEN SATURATION: 97 % | SYSTOLIC BLOOD PRESSURE: 137 MMHG | RESPIRATION RATE: 18 BRPM | BODY MASS INDEX: 31.89 KG/M2 | DIASTOLIC BLOOD PRESSURE: 60 MMHG | TEMPERATURE: 99 F | WEIGHT: 180 LBS | HEART RATE: 78 BPM

## 2020-10-13 DIAGNOSIS — M79.602 LEFT ARM PAIN: Primary | ICD-10-CM

## 2020-10-13 PROCEDURE — 73060 X-RAY EXAM OF HUMERUS: CPT | Mod: LT,S$GLB,, | Performed by: RADIOLOGY

## 2020-10-13 PROCEDURE — 99214 OFFICE O/P EST MOD 30 MIN: CPT | Mod: S$GLB,,, | Performed by: NURSE PRACTITIONER

## 2020-10-13 PROCEDURE — 93010 EKG 12-LEAD: ICD-10-PCS | Mod: S$GLB,,, | Performed by: INTERNAL MEDICINE

## 2020-10-13 PROCEDURE — 93005 ELECTROCARDIOGRAM TRACING: CPT | Mod: S$GLB,,, | Performed by: NURSE PRACTITIONER

## 2020-10-13 PROCEDURE — 73060 XR HUMERUS 2 VIEW LEFT: ICD-10-PCS | Mod: LT,S$GLB,, | Performed by: RADIOLOGY

## 2020-10-13 PROCEDURE — 99214 PR OFFICE/OUTPT VISIT, EST, LEVL IV, 30-39 MIN: ICD-10-PCS | Mod: S$GLB,,, | Performed by: NURSE PRACTITIONER

## 2020-10-13 PROCEDURE — 93005 EKG 12-LEAD: ICD-10-PCS | Mod: S$GLB,,, | Performed by: NURSE PRACTITIONER

## 2020-10-13 PROCEDURE — 93010 ELECTROCARDIOGRAM REPORT: CPT | Mod: S$GLB,,, | Performed by: INTERNAL MEDICINE

## 2020-10-13 RX ORDER — INFLUENZA A VIRUS A/MICHIGAN/45/2015 X-275 (H1N1) ANTIGEN (FORMALDEHYDE INACTIVATED), INFLUENZA A VIRUS A/SINGAPORE/INFIMH-16-0019/2016 IVR-186 (H3N2) ANTIGEN (FORMALDEHYDE INACTIVATED), INFLUENZA B VIRUS B/PHUKET/3073/2013 ANTIGEN (FORMALDEHYDE INACTIVATED), AND INFLUENZA B VIRUS B/MARYLAND/15/2016 BX-69A ANTIGEN (FORMALDEHYDE INACTIVATED) 60; 60; 60; 60 UG/.7ML; UG/.7ML; UG/.7ML; UG/.7ML
INJECTION, SUSPENSION INTRAMUSCULAR
COMMUNITY
Start: 2020-09-08 | End: 2021-09-27

## 2020-10-13 NOTE — PROGRESS NOTES
Subjective:       Patient ID: Abbey Vargas is a 70 y.o. female.    Vitals:  weight is 81.6 kg (180 lb). Her oral temperature is 98.8 °F (37.1 °C). Her blood pressure is 137/60 and her pulse is 78. Her respiration is 18 and oxygen saturation is 97%.     Chief Complaint: Lt Arm Pain (Post Shoulder surgery)    Patient had arthroscopic shoulder surgery 9/24/20.  Had post op f/u 2 days ago.  Was developing pain to elbow and upper arm which she did not have before or after surgery.    has a past medical history of Arthritis, Carpal tunnel syndrome of right wrist, CKD (chronic kidney disease) stage 3, GFR 30-59 ml/min, Depression, Diverticulosis, Hemorrhoids, High cholesterol, Hypertension, Hypoactive thyroid, Muscle pain, Ptosis, Sleep apnea, and Traumatic tear of supraspinatus tendon of right shoulder.    Past Surgical History:  9/24/2020: ARTHROSCOPIC DEBRIDEMENT OF ROTATOR CUFF; Left      Comment:  Procedure: DEBRIDEMENT, ROTATOR CUFF, ARTHROSCOPIC;                 Surgeon: Mg Orozco MD;  Location: [facility];  Service:               Orthopedics;  Laterality: Left;  9/24/2020: ARTHROSCOPIC TENOTOMY OF BICEPS TENDON; Left      Comment:  Procedure: TENOTOMY, BICEPS, ARTHROSCOPIC;  Surgeon:                Mg Orozco MD;  Location: [facility];  Service:                Orthopedics;  Laterality: Left;  9/24/2020: ARTHROSCOPY OF SHOULDER WITH DECOMPRESSION OF SUBACROMIAL   SPACE; Left      Comment:  Procedure: ARTHROSCOPY, SHOULDER, WITH SUBACROMIAL SPACE               DECOMPRESSION;  Surgeon: Mg Orozco MD;  Location:                [facility];  Service: Orthopedics;  Laterality: Left;  9/24/2020: ARTHROSCOPY OF SHOULDER WITH REMOVAL OF DISTAL CLAVICLE;   Left      Comment:  Procedure: ARTHROSCOPY, SHOULDER, WITH DISTAL CLAVICLE                EXCISION;  Surgeon: Mg Orozco MD;  Location: [facility];  Service: Orthopedics;  Laterality: Left;  No date: CARPAL TUNNEL RELEASE; Right  03/21/2017:  CATARACT EXTRACTION; Left  No date: COLONOSCOPY  7/8/2020: COLONOSCOPY; N/A      Comment:  Procedure: COLONOSCOPY;  Surgeon: Julia Meyer MD;  Location: [facility];  Service: Endoscopy;                 Laterality: N/A;  No date: ESOPHAGOGASTRODUODENOSCOPY  No date: FRACTURE SURGERY; Left      Comment:  arm  No date: GANGLION CYST EXCISION; Right      Comment:  wrist  No date: HEMORRHOID SURGERY  06/09/2017: KNEE ARTHROSCOPY; Right  02/07/2018: KNEE SURGERY; Right      Comment:  TKR  9/24/2020: SHOULDER ARTHROSCOPY; Left      Comment:  Procedure: ARTHROSCOPY, SHOULDER;  Surgeon: Mg Orozco MD;  Location: [facility];  Service: Orthopedics;                 Laterality: Left;  05/16/2017: STAPEDECTOMY; Left    Social History    Tobacco Use      Smoking status: Never Smoker      Smokeless tobacco: Never Used    Alcohol use: Not Currently      Alcohol/week: 0.0 standard drinks      Frequency: Monthly or less      Drinks per session: 1 or 2      Binge frequency: Never      Comment: occasional- one drink twice a month    Drug use: No    family history includes Arthritis in her mother; Breast cancer in her maternal grandmother and mother; Cancer in her father and mother; Cataracts in her mother; Colon cancer (age of onset: 58) in her father; Colon polyps in her sister and sister; Glaucoma in her mother; Hypertension in her mother; Scleroderma in her mother.    Shoulder Pain   The pain is present in the left arm and left elbow. This is a new problem. Episode onset: 4 days ago. There has been no history of extremity trauma. The problem occurs constantly. The problem has been gradually worsening. The quality of the pain is described as aching. The pain is at a severity of 8/10. The pain is moderate. Associated symptoms include a limited range of motion (LT shoulder) and stiffness. Pertinent negatives include no fever, headaches, inability to bear weight, itching, joint locking, joint  swelling, numbness, tingling or visual symptoms. The symptoms are aggravated by activity. Treatments tried: Muscle rub. The treatment provided no relief. There is no history of diabetes, Injuries to Extremity or migraines.       Constitution: Negative for chills, fatigue and fever.   HENT: Negative for congestion and sore throat.    Neck: Negative for painful lymph nodes.   Cardiovascular: Negative for chest pain and leg swelling.   Eyes: Negative for double vision and blurred vision.   Respiratory: Negative for cough and shortness of breath.    Gastrointestinal: Negative for nausea, vomiting and diarrhea.   Genitourinary: Negative for dysuria, frequency, urgency and history of kidney stones.   Musculoskeletal: Positive for abnormal ROM of joint (LT shoulder). Negative for joint pain, joint swelling, muscle cramps and muscle ache.   Skin: Negative for color change, pale, rash and bruising.   Allergic/Immunologic: Negative for seasonal allergies.   Neurological: Negative for dizziness, history of vertigo, light-headedness, passing out, headaches and numbness.   Hematologic/Lymphatic: Negative for swollen lymph nodes.   Psychiatric/Behavioral: Negative for nervous/anxious, sleep disturbance and depression. The patient is not nervous/anxious.        Objective:      Physical Exam   Constitutional: She is oriented to person, place, and time.  Non-toxic appearance. No distress.   HENT:   Ears:   Right Ear: External ear normal.   Left Ear: External ear normal.   Mouth/Throat: Mucous membranes are moist.   Eyes: Conjunctivae are normal. No scleral icterus.   Neck: Neck supple.   Cardiovascular: Normal rate.   Pulmonary/Chest: Effort normal. No respiratory distress.   Musculoskeletal:      Left elbow: She exhibits decreased range of motion and swelling. She exhibits no effusion. Tenderness found. Lateral epicondyle tenderness noted.   Neurological: She is alert and oriented to person, place, and time.   Skin: Skin is warm  and dry. Psychiatric: Her behavior is normal. Mood, judgment and thought content normal.             Assessment:       1. Left arm pain        Plan:       Xr Humerus 2 View Left    Result Date: 10/13/2020  EXAMINATION: XR HUMERUS 2 VIEW LEFT CLINICAL HISTORY: Pain in left arm TECHNIQUE: AP and lateral views of the left humerus were obtained COMPARISON: None FINDINGS: Bones are satisfactorily mineralized.  No fracture or dislocation.  Mild spurring at the proximal humerus.  Some degenerative changes also noted at the left AC joint with possibly some erosive change at the distal left clavicle.     As above Electronically signed by: Abad Garcia MD Date:    10/13/2020 Time:    16:14    Left arm pain  -     XR HUMERUS 2 VIEW LEFT; Future; Expected date: 10/13/2020  -     IN OFFICE EKG 12-LEAD (to Muse)      Patient Instructions       Understanding Lateral Epicondylitis    Tendons are strong bands of tissue that connect muscles to bones. Lateral epicondylitis affects the tendons that connect muscles in the forearm to the lateral epicondyle. This is the bony knob on the outer side of the elbow. The condition occurs if the extensor tendons of the wrist become red and swollen (irritated). This can cause pain in the elbow, forearm, and wrist. Because the condition is sometimes caused by playing tennis, it is also known as tennis elbow.  How to say it  [id number]   What causes lateral epicondylitis?  The condition most often occurs because of overuse. This can be from any activity that repeatedly puts stress on the forearm extensor muscles or tendons and wrist. For instance, playing tennis, lifting weights, cutting meat, painting, and typing can all cause the condition. Wear and tear of the tendons from aging or an injury to the tendons can also cause the condition.  Symptoms of lateral epicondylitis  The most common symptom is pain. You may feel it on the outer side of the elbow and down the back of  the forearm. It may be worse when moving or using the elbow, forearm, or wrist. You may also feel pain when gripping or lifting things.  Treatment for lateral epicondylitis  Treatments may include:  · Resting the elbow, forearm, and wrist. Youll need to avoid movements that can make your symptoms worse. You also may need to avoid certain sports and types of work for a time. This helps relieve symptoms and prevent further damage to the tendons.  · Changing the action that caused the problem. For instance, if the tendons were damaged from playing tennis, it may help to change your playing technique or use different equipment. This helps prevent further damage to the tendons.  · Using cold packs. Putting an ice pack on the injured area can help reduce pain and swelling.  · Taking pain medicines. Taking prescription or over-the-counter pain medicines may help reduce pain and swelling.    · Wearing a brace. This helps reduce strain on the muscles and tendons in the forearm, which may relieve symptoms. It is very important to wear the brace properly.  · Doing exercises and physical therapy. These help improve strength and range of motion in the elbow, forearm, and wrist.  · Getting shots of medicine into the injured area. These may help relieve symptoms for a time.  · Having surgery. This may be an option if other treatments fail to relieve symptoms. In many cases, the surgeon removes the damaged tissue.  Possible complications of lateral epicondylitis  If the tendons involved dont heal properly, symptoms may return or get worse. To help prevent this, follow your treatment plan as directed.  When to call your healthcare provider  Call your healthcare provider right away if you have any of these:  · Fever of 100.4°F (38°C) or higher, or as directed  · Redness, swelling, or warmth in the elbow or forearm that gets worse  · Symptoms that dont get better with treatment, or get worse  · New symptoms   Date Last Reviewed:  3/10/2016  © 3358-6151 [employer]. [address] [place] [address]. All rights reserved. This information is not intended as a substitute for professional medical care. Always follow your healthcare professional's instructions.        Treating Tennis Elbow    Your treatment will depend on how inflamed your tendon is. The goal is to relieve your symptoms and help you regain full use of your elbow.  Rest and medicine  Wearing a tennis elbow splint allows the inflamed tendon to rest. It must be worn properly. It should be placed down the arm past the painful area of the elbow. If it is directly over the inflamed tendon, it can worsen the symptoms. This brace can help the tendon heal. Using your other hand or changing your  also takes stress off the tendon. Oral nonsteroidal anti-inflammatory medicines (NSAIDs) and/or ice can relieve pain and reduce swelling.  Exercises and therapy  Your healthcare provider may give you an exercise program. He or she may refer you to a therapist. The therapist will teach you to gently stretch and then strengthen the muscles around your elbow.  Anti-inflammatory injections  Your healthcare provider may give you injections of an anti-inflammatory, such as cortisone. This helps reduce swelling. You may have more pain at first. But in a few days, your elbow should feel better.  If surgery is needed  If your symptoms persist for a long time, or other treatments dont work, your healthcare provider may recommend surgery. Surgery repairs the inflamed tendon.   Date Last Reviewed: 9/26/2015  © 3052-2061 [employer]. [address] [place] [address]. All rights reserved. This information is not intended as a substitute for professional medical care. Always follow your healthcare professional's instructions.      -Take all medications as directed.  If you have been prescribed muscle relaxers, do NOT drive or operate heavy machinery while taking  this medication.  -Rest, elevate your affected extremity above the level of the heart, and apply ice for 20 minutes at a time 3-5 times daily over the next several days.   -Wear splint/sling as directed.  -Follow up with the orthopedist in if you continue with pain or directed at this visit.  -Sometimes it can take up to 1 week for stress fractures to show up on an X-ray.  You may need reimaging or a CT/MRI of the affected area if significant pain persists.     If your condition fails to improve in a timely manner, you should receive another evaluation by your Primary Care Provider or Orthopedic to discuss your concerns.      If your condition worsens at any time, you should report immediately to your nearest Emergency Department for further evaluation. **You must understand that you have received Urgent Care treatment only and that you may be released before all of your medical problems are known or treated. You, the patient, are responsible to arrange for follow-up care as instructed.

## 2020-10-13 NOTE — PATIENT INSTRUCTIONS
Understanding Lateral Epicondylitis    Tendons are strong bands of tissue that connect muscles to bones. Lateral epicondylitis affects the tendons that connect muscles in the forearm to the lateral epicondyle. This is the bony knob on the outer side of the elbow. The condition occurs if the extensor tendons of the wrist become red and swollen (irritated). This can cause pain in the elbow, forearm, and wrist. Because the condition is sometimes caused by playing tennis, it is also known as tennis elbow.  How to say it  [id number]-tuhr-esme [id number]   What causes lateral epicondylitis?  The condition most often occurs because of overuse. This can be from any activity that repeatedly puts stress on the forearm extensor muscles or tendons and wrist. For instance, playing tennis, lifting weights, cutting meat, painting, and typing can all cause the condition. Wear and tear of the tendons from aging or an injury to the tendons can also cause the condition.  Symptoms of lateral epicondylitis  The most common symptom is pain. You may feel it on the outer side of the elbow and down the back of the forearm. It may be worse when moving or using the elbow, forearm, or wrist. You may also feel pain when gripping or lifting things.  Treatment for lateral epicondylitis  Treatments may include:  · Resting the elbow, forearm, and wrist. Youll need to avoid movements that can make your symptoms worse. You also may need to avoid certain sports and types of work for a time. This helps relieve symptoms and prevent further damage to the tendons.  · Changing the action that caused the problem. For instance, if the tendons were damaged from playing tennis, it may help to change your playing technique or use different equipment. This helps prevent further damage to the tendons.  · Using cold packs. Putting an ice pack on the injured area can help reduce pain and swelling.  · Taking pain medicines. Taking prescription or  over-the-counter pain medicines may help reduce pain and swelling.    · Wearing a brace. This helps reduce strain on the muscles and tendons in the forearm, which may relieve symptoms. It is very important to wear the brace properly.  · Doing exercises and physical therapy. These help improve strength and range of motion in the elbow, forearm, and wrist.  · Getting shots of medicine into the injured area. These may help relieve symptoms for a time.  · Having surgery. This may be an option if other treatments fail to relieve symptoms. In many cases, the surgeon removes the damaged tissue.  Possible complications of lateral epicondylitis  If the tendons involved dont heal properly, symptoms may return or get worse. To help prevent this, follow your treatment plan as directed.  When to call your healthcare provider  Call your healthcare provider right away if you have any of these:  · Fever of 100.4°F (38°C) or higher, or as directed  · Redness, swelling, or warmth in the elbow or forearm that gets worse  · Symptoms that dont get better with treatment, or get worse  · New symptoms   Date Last Reviewed: 3/10/2016  © 6314-1091 [employer]. [address] [place] [address]. All rights reserved. This information is not intended as a substitute for professional medical care. Always follow your healthcare professional's instructions.        Treating Tennis Elbow    Your treatment will depend on how inflamed your tendon is. The goal is to relieve your symptoms and help you regain full use of your elbow.  Rest and medicine  Wearing a tennis elbow splint allows the inflamed tendon to rest. It must be worn properly. It should be placed down the arm past the painful area of the elbow. If it is directly over the inflamed tendon, it can worsen the symptoms. This brace can help the tendon heal. Using your other hand or changing your  also takes stress off the tendon. Oral  nonsteroidal anti-inflammatory medicines (NSAIDs) and/or ice can relieve pain and reduce swelling.  Exercises and therapy  Your healthcare provider may give you an exercise program. He or she may refer you to a therapist. The therapist will teach you to gently stretch and then strengthen the muscles around your elbow.  Anti-inflammatory injections  Your healthcare provider may give you injections of an anti-inflammatory, such as cortisone. This helps reduce swelling. You may have more pain at first. But in a few days, your elbow should feel better.  If surgery is needed  If your symptoms persist for a long time, or other treatments dont work, your healthcare provider may recommend surgery. Surgery repairs the inflamed tendon.   Date Last Reviewed: 9/26/2015  © 0608-9643 [employer]. [address] [place] [address], [place] [address]. All rights reserved. This information is not intended as a substitute for professional medical care. Always follow your healthcare professional's instructions.      -Take all medications as directed.  If you have been prescribed muscle relaxers, do NOT drive or operate heavy machinery while taking this medication.  -Rest, elevate your affected extremity above the level of the heart, and apply ice for 20 minutes at a time 3-5 times daily over the next several days.   -Wear splint/sling as directed.  -Follow up with the orthopedist in if you continue with pain or directed at this visit.  -Sometimes it can take up to 1 week for stress fractures to show up on an X-ray.  You may need reimaging or a CT/MRI of the affected area if significant pain persists.     If your condition fails to improve in a timely manner, you should receive another evaluation by your Primary Care Provider or Orthopedic to discuss your concerns.      If your condition worsens at any time, you should report immediately to your nearest Emergency Department for further evaluation. **You must understand that you  have received Urgent Care treatment only and that you may be released before all of your medical problems are known or treated. You, the patient, are responsible to arrange for follow-up care as instructed.

## 2020-10-15 ENCOUNTER — PATIENT MESSAGE (OUTPATIENT)
Dept: ORTHOPEDICS | Facility: CLINIC | Age: 70
End: 2020-10-15

## 2020-10-29 PROBLEM — R53.1 WEAKNESS: Status: ACTIVE | Noted: 2020-10-29

## 2020-10-29 PROBLEM — M25.60 STIFFNESS IN JOINT: Status: ACTIVE | Noted: 2020-10-29

## 2020-11-02 DIAGNOSIS — M25.512 ACUTE PAIN OF LEFT SHOULDER: Primary | ICD-10-CM

## 2020-11-05 PROBLEM — R52 PAIN: Status: ACTIVE | Noted: 2020-11-05

## 2020-12-28 ENCOUNTER — OFFICE VISIT (OUTPATIENT)
Dept: URGENT CARE | Facility: CLINIC | Age: 70
End: 2020-12-28
Payer: MEDICARE

## 2020-12-28 VITALS
SYSTOLIC BLOOD PRESSURE: 134 MMHG | WEIGHT: 185 LBS | HEART RATE: 66 BPM | RESPIRATION RATE: 18 BRPM | DIASTOLIC BLOOD PRESSURE: 72 MMHG | BODY MASS INDEX: 32.78 KG/M2 | HEIGHT: 63 IN | TEMPERATURE: 99 F | OXYGEN SATURATION: 98 %

## 2020-12-28 DIAGNOSIS — N39.0 URINARY TRACT INFECTION WITHOUT HEMATURIA, SITE UNSPECIFIED: ICD-10-CM

## 2020-12-28 DIAGNOSIS — M54.9 DORSALGIA, UNSPECIFIED: ICD-10-CM

## 2020-12-28 DIAGNOSIS — M54.50 ACUTE RIGHT-SIDED LOW BACK PAIN WITHOUT SCIATICA: Primary | ICD-10-CM

## 2020-12-28 LAB
BILIRUB UR QL STRIP: NEGATIVE
GLUCOSE UR QL STRIP: NEGATIVE
KETONES UR QL STRIP: NEGATIVE
LEUKOCYTE ESTERASE UR QL STRIP: POSITIVE
PH, POC UA: 7.5 (ref 5–8)
POC BLOOD, URINE: NEGATIVE
POC NITRATES, URINE: NEGATIVE
PROT UR QL STRIP: NEGATIVE
SP GR UR STRIP: 1.01 (ref 1–1.03)
UROBILINOGEN UR STRIP-ACNC: NORMAL (ref 0.1–1.1)

## 2020-12-28 PROCEDURE — 72100 X-RAY EXAM L-S SPINE 2/3 VWS: CPT | Mod: S$GLB,,, | Performed by: RADIOLOGY

## 2020-12-28 PROCEDURE — 99214 PR OFFICE/OUTPT VISIT, EST, LEVL IV, 30-39 MIN: ICD-10-PCS | Mod: 25,S$GLB,, | Performed by: FAMILY MEDICINE

## 2020-12-28 PROCEDURE — 81003 URINALYSIS AUTO W/O SCOPE: CPT | Mod: QW,S$GLB,, | Performed by: FAMILY MEDICINE

## 2020-12-28 PROCEDURE — 96372 THER/PROPH/DIAG INJ SC/IM: CPT | Mod: S$GLB,,, | Performed by: FAMILY MEDICINE

## 2020-12-28 PROCEDURE — 3008F BODY MASS INDEX DOCD: CPT | Mod: CPTII,S$GLB,, | Performed by: FAMILY MEDICINE

## 2020-12-28 PROCEDURE — 96372 PR INJECTION,THERAP/PROPH/DIAG2ST, IM OR SUBCUT: ICD-10-PCS | Mod: S$GLB,,, | Performed by: FAMILY MEDICINE

## 2020-12-28 PROCEDURE — 1157F PR ADVANCE CARE PLAN OR EQUIV PRESENT IN MEDICAL RECORD: ICD-10-PCS | Mod: S$GLB,,, | Performed by: FAMILY MEDICINE

## 2020-12-28 PROCEDURE — 72100 XR LUMBAR SPINE 2 OR 3 VIEWS: ICD-10-PCS | Mod: S$GLB,,, | Performed by: RADIOLOGY

## 2020-12-28 PROCEDURE — 1157F ADVNC CARE PLAN IN RCRD: CPT | Mod: S$GLB,,, | Performed by: FAMILY MEDICINE

## 2020-12-28 PROCEDURE — 99214 OFFICE O/P EST MOD 30 MIN: CPT | Mod: 25,S$GLB,, | Performed by: FAMILY MEDICINE

## 2020-12-28 PROCEDURE — 81003 POCT URINALYSIS, DIPSTICK, AUTOMATED, W/O SCOPE: ICD-10-PCS | Mod: QW,S$GLB,, | Performed by: FAMILY MEDICINE

## 2020-12-28 PROCEDURE — 3008F PR BODY MASS INDEX (BMI) DOCUMENTED: ICD-10-PCS | Mod: CPTII,S$GLB,, | Performed by: FAMILY MEDICINE

## 2020-12-28 RX ORDER — KETOROLAC TROMETHAMINE 30 MG/ML
30 INJECTION, SOLUTION INTRAMUSCULAR; INTRAVENOUS ONCE
Status: COMPLETED | OUTPATIENT
Start: 2020-12-28 | End: 2020-12-28

## 2020-12-28 RX ORDER — CIPROFLOXACIN 500 MG/1
500 TABLET ORAL 2 TIMES DAILY
Qty: 20 TABLET | Refills: 0 | Status: SHIPPED | OUTPATIENT
Start: 2020-12-28 | End: 2021-01-07

## 2020-12-28 RX ORDER — CHLORZOXAZONE 500 MG/1
500 TABLET ORAL 4 TIMES DAILY PRN
Qty: 40 TABLET | Refills: 0 | Status: SHIPPED | OUTPATIENT
Start: 2020-12-28 | End: 2021-01-07

## 2020-12-28 RX ADMIN — KETOROLAC TROMETHAMINE 30 MG: 30 INJECTION, SOLUTION INTRAMUSCULAR; INTRAVENOUS at 02:12

## 2020-12-28 NOTE — PROGRESS NOTES
"Subjective:       Patient ID: Abbey Vargas is a 70 y.o. female.    Vitals:  height is 5' 3" (1.6 m) and weight is 83.9 kg (185 lb). Her oral temperature is 99.2 °F (37.3 °C). Her blood pressure is 134/72 and her pulse is 66. Her respiration is 18 and oxygen saturation is 98%.     Chief Complaint: Back Pain    Back Pain  This is a new problem. The current episode started yesterday. The problem occurs constantly. The problem has been gradually worsening since onset. Pain location: righ side. The quality of the pain is described as aching and stabbing. The pain is the same all the time. The symptoms are aggravated by twisting, position and bending. Stiffness is present all day. Pertinent negatives include no abdominal pain, chest pain, dysuria, fever, headaches or weakness.       Constitution: Negative for activity change, appetite change, chills, sweating, fatigue and fever.   HENT: Negative for congestion and sore throat.    Neck: Negative for painful lymph nodes.   Cardiovascular: Negative for chest pain and leg swelling.   Eyes: Negative for double vision and blurred vision.   Respiratory: Negative for cough and shortness of breath.    Gastrointestinal: Negative for abdominal pain, abdominal bloating, nausea, vomiting and diarrhea.   Genitourinary: Negative for dysuria, frequency, urgency and history of kidney stones.   Musculoskeletal: Positive for pain, joint pain, arthritis, back pain and muscle ache. Negative for joint swelling and muscle cramps.   Skin: Negative for color change, pale, rash and bruising.   Allergic/Immunologic: Positive for immunizations up-to-date. Negative for seasonal allergies and flu shot.   Neurological: Negative for dizziness, history of vertigo, light-headedness, passing out and headaches.   Hematologic/Lymphatic: Negative for swollen lymph nodes.   Psychiatric/Behavioral: Negative for nervous/anxious, sleep disturbance and depression. The patient is not nervous/anxious.      "   Objective:      Physical Exam   Constitutional: She is oriented to person, place, and time. She appears well-developed. She is cooperative. No distress.   HENT:   Head: Normocephalic and atraumatic.   Nose: Nose normal.   Mouth/Throat: Oropharynx is clear and moist and mucous membranes are normal.   Eyes: Conjunctivae and lids are normal.   Neck: Trachea normal, normal range of motion, full passive range of motion without pain and phonation normal. Neck supple.   Cardiovascular: Normal rate, regular rhythm, normal heart sounds and normal pulses.   Pulmonary/Chest: Effort normal and breath sounds normal.   Abdominal: Soft. Normal appearance and bowel sounds are normal. She exhibits no abdominal bruit, no pulsatile midline mass and no mass.   Musculoskeletal:         General: No deformity.      Lumbar back: She exhibits decreased range of motion, tenderness and pain.        Back:    Neurological: She is alert and oriented to person, place, and time. She has normal strength and normal reflexes. No sensory deficit.   Skin: Skin is warm, dry, intact and not diaphoretic. Psychiatric: Her speech is normal and behavior is normal. Judgment and thought content normal.   Nursing note and vitals reviewed.    Results for orders placed or performed in visit on 12/28/20   POCT Urinalysis, Dipstick, Automated, W/O Scope   Result Value Ref Range    POC Blood, Urine Negative Negative    POC Bilirubin, Urine Negative Negative    POC Urobilinogen, Urine Normal 0.1 - 1.1    POC Ketones, Urine Negative Negative    POC Protein, Urine Negative Negative    POC Nitrates, Urine Negative Negative    POC Glucose, Urine Negative Negative    pH, UA 7.5 5 - 8    POC Specific Gravity, Urine 1.010 1.003 - 1.029    POC Leukocytes, Urine Positive (A) Negative     Type of Interpretation: ED Physician (Independently Interpreted).  Radiology Procedure Done: Lumbar Spine X-Ray.  Interpretation: Arthritic changes, no acute findings.             Assessment:       1. Acute right-sided low back pain without sciatica    2. Dorsalgia, unspecified    3. Urinary tract infection without hematuria, site unspecified        Plan:         Acute right-sided low back pain without sciatica  -     ketorolac injection 30 mg  -     POCT Urinalysis, Dipstick, Automated, W/O Scope  -     X-Ray Lumbar Spine 2 Or 3 Views; Future; Expected date: 12/28/2020  -     chlorzoxazone (PARAFON FORTE) 500 mg Tab; Take 1 tablet (500 mg total) by mouth 4 (four) times daily as needed.  Dispense: 40 tablet; Refill: 0    Dorsalgia, unspecified  -     X-Ray Lumbar Spine 2 Or 3 Views; Future; Expected date: 12/28/2020    Urinary tract infection without hematuria, site unspecified  -     ciprofloxacin HCl (CIPRO) 500 MG tablet; Take 1 tablet (500 mg total) by mouth 2 (two) times daily. for 10 days  Dispense: 20 tablet; Refill: 0     Please return here or go to the Emergency Department for any concerns or worsening of condition.  If you were prescribed antibiotics, please take them to completion.  If you were prescribed a narcotic medication, do not drive or operate heavy equipment or machinery while taking these medications.  Please follow up with your primary care doctor or specialist as needed.  Please drink plenty of fluids.  Please get plenty of rest.  If you were prescribed Pyridium (phenazopyridine), please be aware that if you wear contact lens that this medication may stain your contacts.  While taking this medication it is recommended that you do not wear your contacts until 24 hours after your last dose.    Push fluids aggressively to improve symptoms and wash through the infection.  Cranberry juice can help relieve symptoms  If you  smoke, please stop smoking.    Please follow up with your primary care doctor or specialist as needed.    Katharine Beltran, YOINS  [phone]    You must understand that you have received treatment at an Urgent Care facility only, and that you may  be  released before all of your medical problems are known or treated. Urgent Care facilities are not equipped to  handle life threatening emergencies. It is recommended that you seek care at an Emergency Department for  further evaluation of worsening or concerning symptoms, or possibly life threatening conditions as  Discussed.    Please drink plenty of fluids.  Please get plenty of rest.  Please return here or go to the Emergency Department for any concerns or worsening of condition.  If you were prescribed a narcotic medication, do not drive or operate heavy equipment or machinery while taking these medications.  If you were not prescribed an anti-inflammatory medication, and if you do not have any history of stomach/intestinal ulcers, or kidney disease, or are not taking a blood thinner such as Coumadin, Plavix, Pradaxa, Eloquis, or Xaralta for example, it is OK to take over the counter Ibuprofen or Advil or Motrin or Aleve as directed.  Do not take these medications on an empty stomach.  If you lose control of your bowel and/or bladder, please go to the nearest Emergency Department immediately.  If you lose sensation in between your legs by your genitalia and/or rectum, please go to the nearest Emergency Department immediately.  If you lose control or sensation of any extremity, please go to the nearest Emergency Department immediately.    Moist heat (heating Pad) several times a day to back for relief and comfort.  If you  smoke, please stop smoking.    Please follow up with your primary care doctor or specialist as needed.  Katharine Beltran, YONIS  [phone]    You must understand that you have received treatment at an Urgent Care facility only, and that you may be  released before all of your medical problems are known or treated. Urgent Care facilities are not equipped to  handle life threatening emergencies. It is recommended that you seek care at an Emergency Department for  further evaluation of worsening  or concerning symptoms, or possibly life threatening conditions as  discussed.

## 2020-12-28 NOTE — PATIENT INSTRUCTIONS
Please drink plenty of fluids.  Please get plenty of rest.  Please return here or go to the Emergency Department for any concerns or worsening of condition.  If you were prescribed a narcotic medication, do not drive or operate heavy equipment or machinery while taking these medications.  If you were not prescribed an anti-inflammatory medication, and if you do not have any history of stomach/intestinal ulcers, or kidney disease, or are not taking a blood thinner such as Coumadin, Plavix, Pradaxa, Eloquis, or Xaralta for example, it is OK to take over the counter Ibuprofen or Advil or Motrin or Aleve as directed.  Do not take these medications on an empty stomach.  If you lose control of your bowel and/or bladder, please go to the nearest Emergency Department immediately.  If you lose sensation in between your legs by your genitalia and/or rectum, please go to the nearest Emergency Department immediately.  If you lose control or sensation of any extremity, please go to the nearest Emergency Department immediately.    Moist heat (heating Pad) several times a day to back for relief and comfort.  If you  smoke, please stop smoking.    Please follow up with your primary care doctor or specialist as needed.  Katharine Beltran, NP  [phone]    You must understand that you have received treatment at an Urgent Care facility only, and that you may be  released before all of your medical problems are known or treated. Urgent Care facilities are not equipped to  handle life threatening emergencies. It is recommended that you seek care at an Emergency Department for  further evaluation of worsening or concerning symptoms, or possibly life threatening conditions as  Discussed.    General Neck and Back Pain    Both neck and back pain are usually caused by injury to the muscles or ligaments of the spine. Sometimes the disks that separate each bone of the spine may cause pain by pressing on a nearby nerve. Back and neck pain may  appear after a sudden twisting or bending force (such as in a car accident), or sometimes after a simple awkward movement. In either case, muscle spasm is often present and adds to the pain.  Acute neck and back pain usually gets better in 1 to 2 weeks. Pain related to disk disease, arthritis in the spinal joints or spinal stenosis (narrowing of the spinal canal) can become chronic and last for months or years.  Back and neck pain are common problems. Most people feel better in 1 or 2 weeks, and most of the rest in 1 to 2 months. Most people can remain active.  People experience and describe pain differently.  · Pain can be sharp, stabbing, shooting, aching, cramping, or burning  · Movement, standing, bending, lifting, sitting, or walking may worsen the pain  · Pain can be localized to one spot or area, or it can be more generalized  · Pain can spread or radiate upwards, downwards, to the front, or go down your arms  · Muscle spasm may occur.  Most of the time mechanical problems with the muscles or spine cause the pain. it is usually caused by an injury, whether known or not, to the muscles or ligaments. While illnesses can cause back pain, it is usually not caused by a serious illness. Pain is usually related to physical activity, whether sports, exercise, work, or normal activity. Sometimes it can occur without an identifiable cause. This can happen simply by stretching or moving wrong, without noting pain at the time. Other causes include:  · Overexertion, lifting, pushing, pulling incorrectly or too aggressively.  · Sudden twisting, bending or stretching from an accident (car or fall), or accidental movement.  · Poor posture  · Poor conditioning, lack of regular exercise  · Spinal disc disease or arthritis  · Stress  · Pregnancy, or illness like appendicitis, bladder or kidney infection, pelvic infections   Home care  · For neck pain: Use a comfortable pillow that supports the head and keeps the spine in a  neutral position. The position of the head should not be tilted forward or backward.  · When in bed, try to find a position of comfort. A firm mattress is best. Try lying flat on your back with pillows under your knees. You can also try lying on your side with your knees bent up towards your chest and a pillow between your knees.  · At first, do not try to stretch out the sore spots. If there is a strain, it is not like the good soreness you get after exercising without an injury. In this case, stretching may make it worse.  · Avoid prolonged sitting, long car rides or travel. This puts more stress on the lower back than standing or walking.  · During the first 24 to 72 hours after an injury, apply an ice pack to the painful area for 20 minutes and then remove it for 20 minutes over a period of 60 to 90 minutes or several times a day.   · You can alternate ice and heat therapies. Talk with your healthcare provider about the best treatment for your back or neck pain. As a safety precaution, do not use a heating pad at bedtime. Sleeping with a heating pad can lead to skin burns or tissue damage.  · Therapeutic massage can help relax the back and neck muscles without stretching them.  · Be aware of safe lifting methods and do not lift anything over 15 pounds until all the pain is gone.  Medications  Talk to your healthcare provider before using medicine, especially if you have other medical problems or are taking other medicines.  · You may use over-the-counter medicine to control pain, unless another pain medicine was prescribed. If you have chronic conditions like diabetes, liver or kidney disease, stomach ulcers,  gastrointestinal bleeding, or are taking blood thinner medicines.  · Be careful if you are given pain medicines, narcotics, or medicine for muscle spasm. They can cause drowsiness, and can affect your coordination, reflexes, and judgment. Do not drive or operate heavy machinery.  Follow-up care  Follow up  with your healthcare provider, or as advised. Physical therapy or further tests may be needed.  If X-rays were taken, you will be notified of any new findings that may affect your care.  Call 911  Seek emergency medical care if any of the following occur:  · Trouble breathing  · Confusion  · Very drowsy or trouble awakening  · Fainting or loss of consciousness  · Rapid or very slow heart rate  · Loss of bowel or bladder control  When to seek medical advice  Call your healthcare provider right away if any of these occur:  · Pain becomes worse or spreads into your arms or legs  · Weakness, numbness or pain in one or both arms or legs  · Numbness in the groin area  · Difficulty walking  · Fever of 100.4ºF (38ºC) or higher, or as directed by your healthcare provider  Date Last Reviewed: 7/1/2016 © 2000-2016 [employer]. [address] [place] [address]. All rights reserved. This information is not intended as a substitute for professional medical care. Always follow your healthcare professional's instructions.      Back Pain (Acute or Chronic)    Back pain is one of the most common problems. The good news is that most people feel better in 1 to 2 weeks, and most of the rest in 1 to 2 months. Most people can remain active.  People experience and describe pain differently; not everyone is the same.  · The pain can be sharp, stabbing, shooting, aching, cramping or burning.  · Movement, standing, bending, lifting, sitting, or walking may worsen pain.  · It can be localized to one spot or area, or it can be more generalized.  · It can spread or radiate upwards, to the front, or go down your arms or legs (sciatica).  · It can cause muscle spasm.  Most of the time, mechanical problems with the muscles or spine cause the pain. Mechanical problems are usually caused by an injury to the muscles or ligaments. While illness can cause back pain, it is usually not caused by a serious illness. Mechanical  problems include:   · Physical activity such as sports, exercise, work, or normal activity  · Overexertion, lifting, pushing, pulling incorrectly or too aggressively  · Sudden twisting, bending, or stretching from an accident, or accidental movement  · Poor posture  · Stretching or moving wrong, without noticing pain at the time  · Poor coordination, lack of regular exercise (check with your doctor about this)  · Spinal disc disease or arthritis  · Stress  Pain can also be related to pregnancy, or illness like appendicitis, bladder or kidney infections, pelvic infections, and many other things.  Acute back pain usually gets better in 1 to 2 weeks. Back pain related to disk disease, arthritis in the spinal joints or spinal stenosis (narrowing of the spinal canal) can become chronic and last for months or years.  Unless you had a physical injury (for example, a car accident or fall) X-rays are usually not needed for the initial evaluation of back pain. If pain continues and does not respond to medical treatment, X-rays and other tests may be needed.  Home care  Try these home care recommendations:  · When in bed, try to find a position of comfort. A firm mattress is best. Try lying flat on your back with pillows under your knees. You can also try lying on your side with your knees bent up towards your chest and a pillow between your knees.  · At first, do not try to stretch out the sore spots. If there is a strain, it is not like the good soreness you get after exercising without an injury. In this case, stretching may make it worse.  · Avoid prolong sitting, long car rides, or travel. This puts more stress on the lower back than standing or walking.  · During the first 24 to 72 hours after an acute injury or flare up of chronic back pain, apply an ice pack to the painful area for 20 minutes and then remove it for 20 minutes. Do this over a period of 60 to 90 minutes or several times a day. This will reduce swelling  and pain. Wrap the ice pack in a thin towel or plastic to protect your skin.  · You can start with ice, then switch to heat. Heat (hot shower, hot bath, or heating pad) reduces pain and works well for muscle spasms. Heat can be applied to the painful area for 20 minutes then remove it for 20 minutes. Do this over a period of 60 to 90 minutes or several times a day. Do not sleep on a heating pad. It can lead to skin burns or tissue damage.  · You can alternate ice and heat therapy. Talk with your doctor about the best treatment for your back pain.  · Therapeutic massage can help relax the back muscles without stretching them.  · Be aware of safe lifting methods and do not lift anything without stretching first.  Medicines  Talk to your doctor before using medicine, especially if you have other medical problems or are taking other medicines.  · You may use over-the-counter medicine as directed on the bottle to control pain, unless another pain medicine was prescribed. If you have chronic conditions like diabetes, liver or kidney disease, stomach ulcers, or gastrointestinal bleeding, or are taking blood thinners, talk to your doctor before taking any medicine.  · Be careful if you are given a prescription medicines, narcotics, or medicine for muscle spasms. They can cause drowsiness, affect your coordination, reflexes, and judgement. Do not drive or operate heavy machinery.  Follow-up care  Follow up with your healthcare provider, or as advised.   A radiologist will review any X-rays that were taken. Your provide will notify you of any new findings that may affect your care.  Call 911  Call emergency services if any of the following occur:  · Trouble breathing  · Confusion  · Very drowsy or trouble awakening  · Fainting or loss of consciousness  · Rapid or very slow heart rate  · Loss of bowel or bladder control  When to seek medical advice  Call your healthcare provider right away if any of these occur:   · Pain  becomes worse or spreads to your legs  · Weakness or numbness in one or both legs  · Numbness in the groin or genital area  Date Last Reviewed: 7/1/2016 © 2000-2016 [employer]. [address] [place] [address], [place] [address]. All rights reserved. This information is not intended as a substitute for professional medical care. Always follow your healthcare professional's instructions.      Back Care Tips    Caring for your back  These are things you can do to prevent a recurrence of acute back pain and to reduce symptoms from chronic back pain:  · Maintain a healthy weight. If you are overweight, losing weight will help most types of back pain.  · Exercise is an important part of recovery from most types of back pain. The muscles behind and in front of the spine support the back. This means strengthening both the back muscles and the abdominal muscles will provide better support for your spine.   · Swimming and brisk walking are good overall exercises to improve your fitness level.  · Practice safe lifting methods (below).  · Practice good posture when sitting, standing and walking. Avoid prolonged sitting. This puts more stress on the lower back than standing or walking.  · Wear quality shoes with sufficient arch support. Foot and ankle alignment can affect back symptoms. Women should avoid wearing high heels.  · Therapeutic massage can help relax the back muscles without stretching them.  · During the first 24 to 72 hours after an acute injury or flare-up of chronic back pain, apply an ice pack to the painful area for 20 minutes and then remove it for 20 minutes, over a period of 60 to 90 minutes, or several times a day. As a safety precaution, do not use a heating pad at bedtime. Sleeping on a heating pad can lead to skin burns or tissue damage.  · You can alternate ice and heat therapies.  Medications  Talk to your healthcare provider before using medicines, especially if you have other medical problems  or are taking other medicines.  · You may use acetaminophen or ibuprofen to control pain, unless your healthcare provider prescribed other pain medicine. If you have chronic conditions like diabetes, liver or kidney disease, stomach ulcers, or gastrointestinal bleeding, or are taking blood thinners, talk with your healthcare provider before taking any medicines.  · Be careful if you are given prescription pain medicines, narcotics, or medicine for muscle spasm. They can cause drowsiness, affect your coordination, reflexes, and judgment. Do not drive or operate heavy machinery while taking these types of medicines. Take prescription pain medicine only as prescribed by your healthcare provider.  Lumbar stretch  Here is a simple stretching exercise that will help relax muscle spasm and keep your back more limber. If exercise makes your back pain worse, dont do it.  · Lie on your back with your knees bent and both feet on the ground.  · Slowly raise your left knee to your chest as you flatten your lower back against the floor. Hold for 5 seconds.  · Relax and repeat the exercise with your right knee.  · Do 10 of these exercises for each leg.  Safe lifting method  · Dont bend over at the waist to lift an object off the floor.  Instead, bend your knees and hips in a squat.   · Keep your back and head upright  · Hold the object close to your body, directly in front of you.  · Straighten your legs to lift the object.   · Lower the object to the floor in the reverse fashion.  · If you must slide something across the floor, push it.  Posture tips  Sitting  Sit in chairs with straight backs or low-back support. Keep your knees lower than your hips, with your feet flat on the floor.  When driving, sit up straight. Adjust the seat forward so you are not leaning toward the steering wheel.  A small pillow or rolled towel behind your lower back may help if you are driving long distances.   Standing  When standing for long  periods, shift most of your weight to one leg at a time. Alternate legs every few minutes.   Sleeping  The best way to sleep is on your side with your knees bent. Put a low pillow under your head to support your neck in a neutral spine position. Avoid thick pillows that bend your neck to one side. Put a pillow between your legs to further relax your lower back. If you sleep on your back, put pillows under your knees to support your legs in a slightly flexed position. Use a firm mattress. If your mattress sags, replace it, or use a 1/2-inch plywood board under the mattress to add support.  Follow-up care  Follow up with your healthcare provider, or as advised.  If X-rays, a CT scan or an MRI scan were taken, they will be reviewed by a radiologist. You will be notified of any new findings that may affect your care.  Call 911  Seek emergency medical care if any of the following occur:  · Trouble breathing  · Confusion  · Very drowsy  · Fainting or loss of consciousness  · Rapid or very slow heart rate  · Loss of  bowel or bladder control  When to seek medical care  Call your healthcare provider if any of the following occur:  · Pain becomes worse or spreads to your arms or legs  · Weakness or numbness in one or both arms or legs  · Numbness in the groin area  Date Last Reviewed: 6/1/2016  © 3048-9458 [employer]. [address] [place], [place] [id number]. All rights reserved. This information is not intended as a substitute for professional medical care. Always follow your healthcare professional's instructions.          Please return here or go to the Emergency Department for any concerns or worsening of condition.  If you were prescribed antibiotics, please take them to completion.  If you were prescribed a narcotic medication, do not drive or operate heavy equipment or machinery while taking these medications.  Please follow up with your primary care doctor or specialist as needed.  Please drink plenty  "of fluids.  Please get plenty of rest.  If you were prescribed Pyridium (phenazopyridine), please be aware that if you wear contact lens that this medication may stain your contacts.  While taking this medication it is recommended that you do not wear your contacts until 24 hours after your last dose.    Push fluids aggressively to improve symptoms and wash through the infection.  Cranberry juice can help relieve symptoms  If you  smoke, please stop smoking.    Please follow up with your primary care doctor or specialist as needed.    Katharine Beltran, NP  [phone]    You must understand that you have received treatment at an Urgent Care facility only, and that you may be  released before all of your medical problems are known or treated. Urgent Care facilities are not equipped to  handle life threatening emergencies. It is recommended that you seek care at an Emergency Department for  further evaluation of worsening or concerning symptoms, or possibly life threatening conditions as  discussed.    Bladder Infection, Female (Adult)    Urine is normally doesn't have any bacteria in it. But bacteria can get into the urinary tract from the skin around the rectum. Or they can travel in the blood from elsewhere in the body. Once they are in your urinary tract, they can cause infection in the urethra (urethritis), the bladder (cystitis), or the kidneys (pyelonephritis).  The most common place for an infection is in the bladder. This is called a bladder infection. This is one of the most common infections in women. Most bladder infections are easily treated. They are not serious unless the infection spreads to the kidney.  The phrases "bladder infection," "UTI," and "cystitis" are often used to describe the same thing. But they are not always the same. Cystitis is an inflammation of the bladder. The most common cause of cystitis is an infection.  Symptoms  The infection causes inflammation in the urethra and bladder. This " causes many of the symptoms. The most common symptoms of a bladder infection are:  · Pain or burning when urinating  · Having to urinate more often than usual  · Urgent need to urinate  · Only a small amount of urine comes out  · Blood in urine  · Abdominal discomfort. This is usually in the lower abdomen above the pubic bone.  · Cloudy urine  · Strong- or bad-smelling urine  · Unable to urinate (urinary retention)  · Unable to hold urine in (urinary incontinence)  · Fever  · Loss of appetite  · Confusion (in older adults)  Causes  Bladder infections are not contagious. You can't get one from someone else, from a toilet seat, or from sharing a bath.  The most common cause of bladder infections is bacteria from the bowels. The bacteria get onto the skin around the opening of the urethra. From there, they can get into the urine and travel up to the bladder, causing inflammation and infection. This usually happens because of:  · Wiping improperly after urinating. Always wipe from front to back.  · Bowel incontinence  · Pregnancy  · Procedures such as having a catheter inserted  · Older age  · Not emptying your bladder. This can allow bacteria a chance to grow in your urine.  · Dehydration  · Constipation  · Sex  · Use of a diaphragm for birth control   Treatment  Bladder infections are diagnosed by a urine test. They are treated with antibiotics and usually clear up quickly without complications. Treatment helps prevent a more serious kidney infection.  Medicines  Medicines can help in the treatment of a bladder infection:  · Take antibiotics until they are used up, even if you feel better. It is important to finish them to make sure the infection has cleared.  · You can use acetaminophen or ibuprofen for pain, fever, or discomfort, unless another medicine was prescribed. If you have chronic liver or kidney disease, talk with your healthcare provider before using these medicines. Also talk with your provider if you've  ever had a stomach ulcer or gastrointestinal bleeding, or are taking blood-thinner medicines.  · If you are given phenazopydridine to reduce burning with urination, it will cause your urine to become a bright orange color. This can stain clothing.  Care and prevention  These self-care steps can help prevent future infections:  · Drink plenty of fluids to prevent dehydration and flush out your bladder. Do this unless you must restrict fluids for other health reasons, or your doctor told you not to.  · Proper cleaning after going to the bathroom is important. Wipe from front to back after using the toilet to prevent the spread of bacteria.  · Urinate more often. Don't try to hold urine in for a long time.  · Wear loose-fitting clothes and cotton underwear. Avoid tight-fitting pants.  · Improve your diet and prevent constipation. Eat more fresh fruit and vegetables, and fiber, and less junk and fatty foods.  · Avoid sex until your symptoms are gone.  · Avoid caffeine, alcohol, and spicy foods. These can irritate your bladder.  · Urinate right after intercourse to flush out your bladder.  · If you use birth control pills and have frequent bladder infections, discuss it with your doctor.  Follow-up care  Call your healthcare provider if all symptoms are not gone after 3 days of treatment. This is especially important if you have repeat infections.  If a culture was done, you will be told if your treatment needs to be changed. If directed, you can call to find out the results.  If X-rays were done, you will be told if the results will affect your treatment.  Call 911  Call 911 if any of the following occur:  · Trouble breathing  · Hard to wake up or confusion  · Fainting or loss of consciousness  · Rapid heart rate  When to seek medical advice  Call your healthcare provider right away if any of these occur:  · Fever of 100.4ºF (38.0ºC) or higher, or as directed by your healthcare provider  · Symptoms are not better by the  third day of treatment  · Back or belly (abdominal) pain that gets worse  · Repeated vomiting, or unable to keep medicine down  · Weakness or dizziness  · Vaginal discharge  · Pain, redness, or swelling in the outer vaginal area (labia)  Date Last Reviewed: 10/1/2016  © 9411-1330 [employer]. [address] [place] [address], [place] [address]. All rights reserved. This information is not intended as a substitute for professional medical care. Always follow your healthcare professional's instructions.

## 2020-12-28 NOTE — LETTER
December 28, 2020  Abbey Vargas  [address]  [place] [address]                [facility] [place]  [address], [address]  [place] [address]  Phone: [phone]  Fax: [phone] Abbey Vargas was seen and treated in our Urgent Care department on 12/28/2020. She may return to work in 2 - 3 days.      If you have any questions or concerns, please don't hesitate to call.        Sincerely,        Amari Arce MD

## 2020-12-28 NOTE — PROGRESS NOTES
Subjective:       Patient ID: Abbey Vargas is a 70 y.o. female.    Chief Complaint: Back Pain    HPI  ROS     Objective:      Physical Exam    Assessment:       No diagnosis found.    Plan:                   No follow-ups on file.

## 2021-05-22 ENCOUNTER — HOSPITAL ENCOUNTER (OUTPATIENT)
Dept: SLEEP MEDICINE | Facility: HOSPITAL | Age: 71
Discharge: HOME | End: 2021-05-22
Attending: INTERNAL MEDICINE
Payer: MEDICARE

## 2021-05-22 DIAGNOSIS — G47.33 OBSTRUCTIVE SLEEP APNEA (ADULT) (PEDIATRIC): ICD-10-CM

## 2021-05-22 PROCEDURE — 95811 PR POLYSOMNOGRAPHY W/CPAP: ICD-10-PCS | Mod: 26,,, | Performed by: INTERNAL MEDICINE

## 2021-05-22 PROCEDURE — 95811 POLYSOM 6/>YRS CPAP 4/> PARM: CPT | Mod: 26,,, | Performed by: INTERNAL MEDICINE

## 2021-05-22 PROCEDURE — 95811 POLYSOM 6/>YRS CPAP 4/> PARM: CPT

## 2022-04-20 NOTE — DISCHARGE INSTRUCTIONS
Stapes Surgery  Stapes surgery can improve conductive hearing. This surgery is done to replace all or part of a damaged (fixated) stapes bone. You will be given general anesthesia or local anesthesia with sedation during surgery. The surgery takes about 1 to 2 hours.    A diseased stapes bone  The stapes bone may become affected by otosclerosis, an inherited middle ear disease. The disease creates spongy bone tissue. The tissue grows and hardens around the footplate (the part of the stapes that touches the inner ear). Hearing loss may result.    Removing bone  The first step of stapes surgery is removing the crura. This is the part of the stapes that touches the footplate. Your surgeon reaches the crura by going through the ear canal. An incision is made around the eardrum. The eardrum is held to one side. Then the crura is removed.    Preparing bone  The second step is preparing the diseased footplate for bone replacement. This will let sound vibrations reach the inner ear again. Your surgeon may make a hole in the footplate with a laser or drill. This is called a stapedotomy. Or all of the footplate may be removed and replaced with tissue. This is called a stapedectomy.    Replacing bone  The third step is replacing the crura. A manmade part (called a prosthesis) is attached to the incus bone. The prosthesis transmits sound waves to the inner ear. There are many types of prostheses. They are most often made of metal, plastic, or your own tissue. But some may use more than one of these materials.  Surgical complications  As with any surgery, this surgery has risks. You may still have some hearing loss that remains after surgery. In rare cases, the surgery can make hearing loss worse. Talk with your healthcare provider about any complications that this surgery has.   Date Last Reviewed: 7/1/2016  © 6242-8748 [email or web address]. [address] [place] [address], [place] [address]. All rights reserved. This information  is not intended as a substitute for professional medical care. Always follow your healthcare professional's instructions.    Keep ear clean and dry   Dry ear precautions    DO NOT USE CPAP FOR 5 DAYS      RTC 3 weekd  Do not strain, cough and sneeze with mouth open, do not bend over or lift over 10 pounds    Call md with any ear drainage or swelling, severe pain, facial weakness or other concerning symptoms     [Follow-Up Visit] : a follow-up [Spouse] : spouse [FreeTextEntry2] : ovarian cancer

## 2022-08-01 ENCOUNTER — TELEPHONE (OUTPATIENT)
Dept: GASTROENTEROLOGY | Facility: CLINIC | Age: 72
End: 2022-08-01
Payer: MEDICARE

## 2022-08-01 NOTE — TELEPHONE ENCOUNTER
Called and spoke with pt:  discussed with pt that Dr. Hyde is a consultant that does not accept patients as a primary GI provider.  Discussed that she will send them back to their primary GI provider once their symptoms improved or the plan of care is established.     Pt was told the logistics of the appointment (arrival time, length of visit, total time spent at facility).     Pt was also told that Dr. Hyde will review their previous workup but may order additional test and perform her own procedures and that this may require an overnight stay at a local hotel to complete the workup. Patient agreed and verbalized understanding.     Explained NP apt is ~ 3-4 mos wait list.    [facility] faxed to Dr Whalen requesting BX report from EGD done on 2/4/20  Fax: [phone]  Fax successful @ 11:45am    [facility] faxed to Dr Rouse requesting CT neck.   Fax: [phone]  Fax successful @ 11:46am

## 2022-08-02 ENCOUNTER — TELEPHONE (OUTPATIENT)
Dept: GASTROENTEROLOGY | Facility: CLINIC | Age: 72
End: 2022-08-02
Payer: MEDICARE

## 2022-08-02 NOTE — TELEPHONE ENCOUNTER
Received medical records from Dr Whalen:  Bx report 2/4/20  Ov note 1/29/20  OV note from Dr Buchanan (ENT) 1/15/20

## 2022-08-11 ENCOUNTER — TELEPHONE (OUTPATIENT)
Dept: GASTROENTEROLOGY | Facility: CLINIC | Age: 72
End: 2022-08-11
Payer: MEDICARE

## 2022-10-10 ENCOUNTER — TELEPHONE (OUTPATIENT)
Dept: GASTROENTEROLOGY | Facility: CLINIC | Age: 72
End: 2022-10-10

## 2022-10-10 ENCOUNTER — PATIENT MESSAGE (OUTPATIENT)
Dept: GASTROENTEROLOGY | Facility: CLINIC | Age: 72
End: 2022-10-10

## 2022-10-10 ENCOUNTER — TELEPHONE (OUTPATIENT)
Dept: GASTROENTEROLOGY | Facility: CLINIC | Age: 72
End: 2022-10-10
Payer: MEDICARE

## 2022-10-10 ENCOUNTER — OFFICE VISIT (OUTPATIENT)
Dept: GASTROENTEROLOGY | Facility: CLINIC | Age: 72
End: 2022-10-10
Payer: MEDICARE

## 2022-10-10 DIAGNOSIS — R13.10 DYSPHAGIA, UNSPECIFIED TYPE: ICD-10-CM

## 2022-10-10 DIAGNOSIS — R63.4 WEIGHT LOSS: ICD-10-CM

## 2022-10-10 DIAGNOSIS — K21.9 GASTROESOPHAGEAL REFLUX DISEASE, UNSPECIFIED WHETHER ESOPHAGITIS PRESENT: Primary | ICD-10-CM

## 2022-10-10 DIAGNOSIS — R68.81 EARLY SATIETY: ICD-10-CM

## 2022-10-10 DIAGNOSIS — R49.0 HOARSENESS: ICD-10-CM

## 2022-10-10 PROCEDURE — 4010F PR ACE/ARB THEARPY RXD/TAKEN: ICD-10-PCS | Mod: CPTII,95,, | Performed by: INTERNAL MEDICINE

## 2022-10-10 PROCEDURE — 1160F RVW MEDS BY RX/DR IN RCRD: CPT | Mod: CPTII,95,, | Performed by: INTERNAL MEDICINE

## 2022-10-10 PROCEDURE — 1157F PR ADVANCE CARE PLAN OR EQUIV PRESENT IN MEDICAL RECORD: ICD-10-PCS | Mod: CPTII,95,, | Performed by: INTERNAL MEDICINE

## 2022-10-10 PROCEDURE — 1157F ADVNC CARE PLAN IN RCRD: CPT | Mod: CPTII,95,, | Performed by: INTERNAL MEDICINE

## 2022-10-10 PROCEDURE — 99205 OFFICE O/P NEW HI 60 MIN: CPT | Mod: 95,,, | Performed by: INTERNAL MEDICINE

## 2022-10-10 PROCEDURE — 4010F ACE/ARB THERAPY RXD/TAKEN: CPT | Mod: CPTII,95,, | Performed by: INTERNAL MEDICINE

## 2022-10-10 PROCEDURE — 1160F PR REVIEW ALL MEDS BY PRESCRIBER/CLIN PHARMACIST DOCUMENTED: ICD-10-PCS | Mod: CPTII,95,, | Performed by: INTERNAL MEDICINE

## 2022-10-10 PROCEDURE — 1159F MED LIST DOCD IN RCRD: CPT | Mod: CPTII,95,, | Performed by: INTERNAL MEDICINE

## 2022-10-10 PROCEDURE — 1159F PR MEDICATION LIST DOCUMENTED IN MEDICAL RECORD: ICD-10-PCS | Mod: CPTII,95,, | Performed by: INTERNAL MEDICINE

## 2022-10-10 PROCEDURE — 99205 PR OFFICE/OUTPT VISIT, NEW, LEVL V, 60-74 MIN: ICD-10-PCS | Mod: 95,,, | Performed by: INTERNAL MEDICINE

## 2022-10-10 NOTE — TELEPHONE ENCOUNTER
MOTILITY CLINIC PROCEDURE ORDERS    CLEARANCE FOR PROCEDURES:  [x] Not needed   [] Cardiology   [] Pulmonary  [] PCP    PROCEDURES  [] EGD   [] Colonoscopy   [x] EGD with EndoFlip   [x] Esophageal manometry with impedance - dysphagia   [] Esophageal manometry with impedance - rumination  [] Esophageal manometry with impedance - belching   [] EGD with BRAVO 48 hours   [x] EGD with BRAVO 96 hours   [] pH Impedance 24 hours   [] Anorectal manometry   [] Rectal Ultrasound     Does manometry need to be placed endoscopically:   [] Yes    FLOOR:  [x] 4th Floor   [] 2nd Floor    Reason for 2nd Floor:   [] Gastroparesis   [] End stage achalasia  [] Pre lung transplant   [] Pre hear transplant   [] Pulmonary HTN (PAP>50 or on meds)   [] Severe pulmonary Disease   [] Complex medical problems   [] BMI>50  [] History of anesthesia issues  [] Other:    PREP  [x] Standard Prep  [] Severe Constipation Prep (Low residue diet 7 days.  1.5 prep the day prior, 0.5 prep the day of procedure)  [] Full liquid diet 5 days   [] Full liquid diet 3 days   [] Full liquid diet 2 days   [] Full liquid diet 1 day   [] Other:     MEDICATIONS    pH Studies  [] ON PPI/H2 Blocker   [x] OFF PPI/H2 Blocker     Metal allergy (stainless steal w chromium, nickel, copper, cobalt and iron).:   []  Yes    Pacemaker/defibrillator:  [] Yes    Motility Studies (esophageal manometry/anorectal manometry)  Hold narcotics x 1 days if able   Hold TCA x 1 days if able  Propofol/lidocaine only during sedation.  Discuss with Dr. Hyde if additional sedation needed.   Hold baclofen for thee days (at least one day) if able   Hold muscle relaxants x 1 day if able     Anticoagulation/antiplt agents:   []  Yes    ORDER OF TESTING:  Day 1: Manometry +/- ENT   Day 2: EGD/Flip/BRAVO +/- ENT    [] No special requirements - pt lives locally     SCHEDULING PRIORITY  [] Urgent  (<7 days)  [] Lung Transplant Workup  [] Priority (<30 days)  [x] Routine 1 (schedule ASAP)  [] Routine  2 (next available, < 3 months)   [] Routine 3 (ok if > 3 months)       PHYSICIAN  []  Ok with Dr. Browning   []  Ok with any GI MD

## 2022-10-10 NOTE — PROGRESS NOTES
The patient location is: Home  The chief complaint leading to consultation is: GERD, dysphagia, hoarseness, cough, throat clearing    Visit type: audiovisual    Face to Face time with patient: 55  70 minutes of total time spent on the encounter, which includes face to face time and non-face to face time preparing to see the patient (eg, review of tests), Obtaining and/or reviewing separately obtained history, Documenting clinical information in the electronic or other health record, Independently interpreting results (not separately reported) and communicating results to the patient/family/caregiver, or Care coordination (not separately reported).         Each patient to whom he or she provides medical services by telemedicine is:  (1) informed of the relationship between the physician and patient and the respective role of any other health care provider with respect to management of the patient; and (2) notified that he or she may decline to receive medical services by telemedicine and may withdraw from such care at any time.    Notes:        [facility] Consultation Note    Reason for Consult:  No chief complaint on file.        PCP:   Katharine Beltran   [address] / [facility] [address]    Referring MD:  Jeff Barajas Md  [address]  [place] [address]    Pulm: Fantasma Elizabeth DO   ENT: Dr. Sara Rockwell      HPI:  Abbey Vargas is a 72 y.o. female referred to motility clinic for second opinion regarding the following problems:    Dr. Arguello 07/26/2022: Consult [facility] for either pH study or impedance study and manometry.    GERD.    HH  Retrosternal pyrosis: none   Regurgitation: none   Upright symptoms: no  Nocturnal symptoms: no     No sx prior to protonix     Hoarseness: daily  Cough: few per week and at night   Throat clearing: daily     Sx started: 6 years     Caffeine intake:1 cup   Sleeps with head of the bed elevated. Yes- recliner  Avoids  eating prior to bedtime.  3 hours     MEDs;   Protonix 40mg BID for >3 months without improvement     Sore throat   Cough   Followed by pulmonologist  Recent diagnosis of asthma, post nasal drip   Breo inhaler without improvement     Sx started 6 years ago   Tried speech pathology without improvement     Seen ENT at OSH   Had blood allergy testing but does not know the results   Nasal steroid did not make a difference     Dysphagia.    Problems with solids: Occasional rice,  Overall rare   Problems with liquids: rare  Choking while eating : rare   Coughing wile eating: rare   Location: ?   Onset of symptoms: years     No ho CVA.  Denies neurological deficits         EGD w dilation of ring at GEJ w 18mm.  Pt does not recall having much dysphagia     Early satiety.  Daily     New diarrhea for weeks but nor resolved   [place] 4   BM 3/day    Father w CRC 58.  Colonoscopy negative 7/2022  -Repeat in 7/2027 per primary GI    Weight loss   Early satiety  15-20 Lb in few months   159.6 lb now     Denies nausea, vomiting, abdominal pain, bloating, constipation, BRBPR, melena    Total visit time was  70  minutes, more than 50% of which was spent in face-to-face counseling with patient regarding symptoms, diagnostic results, prognosis, risks and benefits of treatment options, instructions for management, importance of compliance with chosen treatment options and coping strategies.      Previous Studies:   I spend 10 minutes reviewing records prior to apt.   EGD 06/14/2022: Normal esophagus.  Atrophic gastritis.  Normal duodenum.  Colonoscopy 07/08/2022: Normal except for hemorrhoids.  Family history of colon cancer.  Repeat 5 years.  Esophagram 05/18/2022:  EZ gas crystals thick and thin barium were given to patient orally.  Tertiary contractions with poor clearing of the esophagus.  No focal stricture.  Swallowing the barium tablet the pill was stuck in the vallecula for sec for all seconds.  No focal stricture.  No hernia.   Severe GERD  CT soft tissue neck 07/10/2020: Prominence of wall of esophagus no specific otherwise negative CT of the neck with no focal masses.  EGD 02/04/2020:  Ring at GE junction.  Dilation 18 mm.  Normal mucosa in middle 3rd of the esophagus (negative).  Hiatal hernia.  Normal mucosa in the stomach.  Modified barium swallow 12/20/2019:  Oral stage:  Decreased tongue base retraction with residual in vallecula.  Pharyngeal stage: Mild decrease laryngeal excursion epiglottic inversion and cricopharyngeal.  Viewing of cervical spine indicated contained presence of bony protrusion at C4-C5 with prominent cricopharyngeal.  This structural issue cause restriction of bolus flow from pharynx to do upper esophageal sphincter and into cervical esophagus.  Mild vallecular, minding posterior pharyngeal wall and mild [place] form residue for all consistencies.  Trace and inconsistent laryngeal penetration full liquids rated level 3.  Aspiration not detected and controlled study but after testing patient provided with cup of water to [place] or cavity with resulted in choking coughing episode.  AP views with bilateral symmetrical stasis in vallecula and piriform sinuses.  Screening of esophagus indicated slow clearing.  Mild-to-moderate oropharyngeal dysphagia due to structural restriction.  Etiology for decreased tongue base contraction and high all or pharyngeal anterior component unknown.  Prognosis guarded.  CT chest 09/30/2017:  Nondisplaced sternal fracture.  No acute intrathoracic finding.  CT abdomen 09/30/2017:  No evidence of acute intra-abdominal abnormality.  Colonic diverticulosis.    Relevant Surgical History:    NA    ROS:  ROS     Complete ROS negative except as above    Medical History:   Past Medical History:   Diagnosis Date    Arthritis     Carpal tunnel syndrome of right wrist 2000    Care for it here @ [facility] [place]    CKD (chronic kidney disease) stage 3, GFR 30-59 ml/min 01/30/2018    COVID-19 vaccine administered      vaccine and booster    Depression     Diverticulosis     Environmental allergies     Hemorrhoids     High cholesterol     Hypertension     Hypoactive thyroid     Muscle pain     Osteoporosis     Ptosis     Sleep apnea 2000    [place] [facility] [place]    Traumatic tear of supraspinatus tendon of right shoulder         Surgical History:   Past Surgical History:   Procedure Laterality Date    ARTHROSCOPIC DEBRIDEMENT OF ROTATOR CUFF Left 9/24/2020    Procedure: DEBRIDEMENT, ROTATOR CUFF, ARTHROSCOPIC;  Surgeon: Mg Orozco MD;  Location: [facility];  Service: Orthopedics;  Laterality: Left;    ARTHROSCOPIC TENOTOMY OF BICEPS TENDON Left 9/24/2020    Procedure: TENOTOMY, BICEPS, ARTHROSCOPIC;  Surgeon: Mg Orozco MD;  Location: [facility];  Service: Orthopedics;  Laterality: Left;    ARTHROSCOPY OF SHOULDER WITH DECOMPRESSION OF SUBACROMIAL SPACE Left 9/24/2020    Procedure: ARTHROSCOPY, SHOULDER, WITH SUBACROMIAL SPACE DECOMPRESSION;  Surgeon: Mg Orozco MD;  Location: [facility];  Service: Orthopedics;  Laterality: Left;    ARTHROSCOPY OF SHOULDER WITH REMOVAL OF DISTAL CLAVICLE Left 9/24/2020    Procedure: ARTHROSCOPY, SHOULDER, WITH DISTAL CLAVICLE EXCISION;  Surgeon: Mg Orozco MD;  Location: [facility];  Service: Orthopedics;  Laterality: Left;    CARPAL TUNNEL RELEASE Right     CATARACT EXTRACTION Left 03/21/2017    COLONOSCOPY      COLONOSCOPY N/A 7/8/2020    Procedure: COLONOSCOPY;  Surgeon: Julia Meyer MD;  Location: [facility];  Service: Endoscopy;  Laterality: N/A;    ESOPHAGOGASTRODUODENOSCOPY      ESOPHAGOGASTRODUODENOSCOPY N/A 6/14/2022    Procedure: EGD (ESOPHAGOGASTRODUODENOSCOPY);  Surgeon: Jeff Barajas MD;  Location: [facility];  Service: Endoscopy;  Laterality: N/A;    FRACTURE SURGERY Left     arm    GANGLION CYST EXCISION Right     wrist    HEMORRHOID SURGERY      KNEE ARTHROSCOPY Right 06/09/2017    KNEE SURGERY Right 02/07/2018    TKR    SHOULDER ARTHROSCOPY Left 9/24/2020     Procedure: ARTHROSCOPY, SHOULDER;  Surgeon: Mg Orozco MD;  Location: [facility];  Service: Orthopedics;  Laterality: Left;    STAPEDECTOMY Left 05/16/2017        Family History:   Family History   Problem Relation Age of Onset    Arthritis Mother     Scleroderma Mother     Cancer Mother     Hypertension Mother     Glaucoma Mother     Cataracts Mother     Breast cancer Mother     Cancer Father 54        colon    Colon cancer Father 58    Colon polyps Sister     Cancer Sister         breast    Colon polyps Sister     Breast cancer Maternal Grandmother     Stomach cancer Neg Hx     Liver cancer Neg Hx     Esophageal cancer Neg Hx     Celiac disease Neg Hx     Cirrhosis Neg Hx         Social History:   Social History     Socioeconomic History    Marital status:     Years of education: 12   Tobacco Use    Smoking status: Never    Smokeless tobacco: Never   Substance and Sexual Activity    Alcohol use: Yes     Alcohol/week: 0.0 standard drinks     Comment: occasional- one drink twice a month    Drug use: No    Sexual activity: Not Currently     Partners: Male     Birth control/protection: Post-menopausal, Abstinence, See Surgical Hx   Other Topics Concern    Are you pregnant or think you may be? No    Breast-feeding No     Social Determinants of Health     Financial Resource Strain: Low Risk     Difficulty of Paying Living Expenses: Not hard at all   Food Insecurity: No Food Insecurity    Worried About Running Out of Food in the Last Year: Never true    Ran Out of Food in the Last Year: Never true   Transportation Needs: No Transportation Needs    Lack of Transportation (Medical): No    Lack of Transportation (Non-Medical): No   Physical Activity: Insufficiently Active    Days of Exercise per Week: 1 day    Minutes of Exercise per Session: 20 min   Stress: No Stress Concern Present    Feeling of Stress : Only a little   Social Connections: Unknown    Frequency of Communication with Friends and Family: More  than three times a week    Frequency of Social Gatherings with Friends and Family: Three times a week    Active Member of Clubs or Organizations: Yes    Attends Club or Organization Meetings: More than 4 times per year    Marital Status:    Housing Stability: Low Risk     Unable to Pay for Housing in the Last Year: No    Number of Places Lived in the Last Year: 1    Unstable Housing in the Last Year: No        Review of patient's allergies indicates:  No Known Allergies    Current Outpatient Medications   Medication Sig Dispense Refill    alendronate (FOSAMAX) 70 MG tablet TAKE 1 TABLET BY MOUTH ON EMPTY STOMACH EVERY 7 DAYS. 8 OZ WATER.SIT UPRIGHT AT LEAST 30 MIN AFTER 12 tablet 3    aspirin (ECOTRIN) 81 MG EC tablet Take 81 mg by mouth once daily.      BREO ELLIPTA 200-25 mcg/dose DsDv diskus inhaler INHALE 1 PUFF INTO THE LUNGS ONCE DAILY 60 each 5    calcium-vitamin D3 (OS- + D3) 500 mg(1,250mg) -200 unit per tablet       fish oil-omega-3 fatty acids 300-1,000 mg capsule Take 2 g by mouth once daily.      glucosamine sulfate 750 mg Tab Take 750 mg by mouth once daily.      levothyroxine (SYNTHROID) 100 MCG tablet TAKE 1 TABLET BY MOUTH EVERY DAY 90 tablet 3    LIDOcaine (LIDODERM) 5 % Place 1 patch onto the skin once daily. Remove & Discard patch within 12 hours or as directed by MD 7 patch 0    lisinopriL 10 MG tablet Take 1 tablet (10 mg total) by mouth every evening. 90 tablet 3    multivitamin (THERAGRAN) per tablet Take 1 tablet by mouth once daily.      pantoprazole (PROTONIX) 40 MG tablet Take 40 mg by mouth 2 (two) times daily.      pravastatin (PRAVACHOL) 80 MG tablet Take 1 tablet (80 mg total) by mouth every evening. 90 tablet 3     No current facility-administered medications for this visit.     Facility-Administered Medications Ordered in Other Visits   Medication Dose Route Frequency Provider Last Rate Last Admin    0.9%  NaCl infusion   Intravenous Continuous Jeff Barajas MD  300 mL/hr at 06/14/22 0726 Restarted at 06/14/22 0734    ondansetron injection 4 mg  4 mg Intravenous Once PRN Hang Moreno MD        promethazine (PHENERGAN) 6.25 mg in dextrose 5 % 50 mL IVPB  6.25 mg Intravenous Q10 Min PRN Hang Moreno MD            Objective Findings:  Vital Signs:  There were no vitals taken for this visit.  There is no height or weight on file to calculate BMI.    Physical Exam:  Physical Exam:limited due to video visit  General appearance: alert, cooperative, no distress  HENT: Normocephalic, atraumatic, neck symmetrical, no nasal discharge  Eyes: conjunctivae/corneas clear,  EOM's intact  Extremities: visible extremities symmetric; no clubbing, cyanosis, or edema  Integument: visible Skin color, texture, turgor normal; no rashes; hair distrubution normal  Neurologic: Alert and oriented X 3,  Psychiatric: no pressured speech; normal affect; no evidence of impaired cognition    Labs:   Reviewed in Epic/record      Assessment and Plan:  Abbey Vargas is a 72 y.o. female with referred to Esophageal Motility Clinic for 2nd opinion regarding following problems:    GERD.    Denies typical sx.  Atypical Sx  HH  Severe GERD on esophagogram   No improvement with protonix 40mg BID   -EGD w EOE, G, D, Flip, BRAVO 96h off PPI     Hoarseness. Throat clearing  Sore throat   Cough.   MBS 2019 w mild-to-moderate oropharyngeal dysphagia  Tried speech pathology 2019 without improvement   Seen ENT at OSH.  Had blood allergy testing but does not know the results   Nasal steroid did not make a difference   -Ref to Dr. Harding     Cough.   Asthma. On Breo.   Followed by pulmonologist.     Rare dysphagia to solids  EGD w dilation of ring at GEJ w 18mm.  Pt does not recall having much dysphagia   Esophagogram w tertiary contractions and poor clearing of esophagus.  Ct neck w prominence of wall of esophagus  -EGD w Flip  -Manometry     Mild-to-moderate oropharyngeal dysphagia due to structural  restriction (bony protrusion at C4-C5 with prominent cricopharyngeal mm)  Barium tablet stuck in the vallecula on esophagogram 2022  No ho CVA.  Denies neurological deficits  -Ref to Dr. Harding     New early satiety  -EGD    -Def to ref GI     New diarrhea for weeks but now resolved   -Def to ref GI     Weight loss.  Recent EGD/colon negative  -Shakes TID   -Def to ref GI   -FU w PCP for further grant     Follow up in about 3 months (around 1/10/2023).    1. Gastroesophageal reflux disease, unspecified whether esophagitis present    2. Dysphagia, unspecified type    3. Hoarseness    4. Early satiety          Order summary:  Orders Placed This Encounter    Ambulatory referral/consult to ENT    Case Request Endoscopy: ESOPHAGOGASTRODUODENOSCOPY (EGD) with BRAVO    Case Request Endoscopy: MANOMETRY-ESOPHAGEAL-WITH IMPEDANCE       Discussed with PT that I act as a consult service and do not accept patients to be their primary GI provider. Discussed that the goal of our visits is to address relevant motility problems while deferring other GI problems as well as screening and surveillance to his/her primary GI provider.   Discussed that he/she needs to continue to follow with his local primary GI provider.  Discussed that we will complete his/her workup, clarify diagnosis and attempt to optimize his/her symptoms with intention of him/her returning to referring GI provider for long term GI care.   Pt verbalized understanding.        Thank you so much for allowing me to participate in the care of Abbey Hyde MD      This note was created using voice recognition software, and may contain some unrecognized transcriptional errors.

## 2022-10-10 NOTE — TELEPHONE ENCOUNTER
BOGDANS reviewed with pt, copy sent to portal.  Pt will call endoscopy  and LM that she is interested in scheduling procedures.  Explained will send dr Harding's staff a  message re: referral in epic and will ask a staff member to call and schedule an appointment.  Asked pt to let me know if she does not hear back from either Dr Harding or endoscopy  within 7-10 business days  Pt agreed with abv    Fu ~ 3 mos

## 2022-10-10 NOTE — PATIENT INSTRUCTIONS
-Drink high protein shakes three times per day     -Discuss your unintentional weight loss with your primary care as soon as possible     -Complete EGD with EndoFlip  EGD is an endoscopic procedure that allows your doctor to examine your esophagus, stomach and duodenum (part of your small intestine). EGD is an outpatient procedure, meaning you can go home that same day. It takes approximately 30 to 60 minutes to perform.  EndoFLIP is a technology that simultaneously measures the area across the inside of a gastrointestinal organ (for example, the esophagus) and the pressure inside that organ. The ratio of the two measurements is called distensibility (stiffness).    -Complete EGD with BRAVO   The BRAVO pH test measures the amount of acidity (pH) in the esophagus. This helps diagnose gastroesophageal reflux disease (GERD). For this test, a clinician will adhere a small recording device called a BRAVO capsule to the esophagus to monitor how much acid is flowing backward from the stomach.    -Complete esophageal manometry   During esophageal manometry, a thin, flexible tube (catheter) that contains pressure sensors is passed through your nose, down your esophagus and into your stomach. Esophageal manometry can be helpful in diagnosing certain disorders that can affect your esophagus.  Esophageal manometry provides information about the movement of food through the esophagus into the stomach. The test measures how well the muscles at the top and bottom of your esophagus (sphincter muscles) open and close, as well as the pressure, speed and pattern of the wave of esophageal muscle contractions that moves food along.    -See. Dr. Harding to discuss your voice problems

## 2022-10-11 ENCOUNTER — PATIENT MESSAGE (OUTPATIENT)
Dept: OTOLARYNGOLOGY | Facility: CLINIC | Age: 72
End: 2022-10-11
Payer: MEDICARE

## 2022-10-13 ENCOUNTER — TELEPHONE (OUTPATIENT)
Dept: ENDOSCOPY | Facility: HOSPITAL | Age: 72
End: 2022-10-13
Payer: MEDICARE

## 2022-10-13 NOTE — TELEPHONE ENCOUNTER
Pt called to scheduled to scheduled Esophageal manometry per Dr. Hyde note  Pt requesting a call back

## 2022-10-18 ENCOUNTER — TELEPHONE (OUTPATIENT)
Dept: ENDOSCOPY | Facility: HOSPITAL | Age: 72
End: 2022-10-18
Payer: MEDICARE

## 2022-10-18 RX ORDER — TOBRAMYCIN / DEXAMETHASONE 3; .5 MG/ML; MG/ML
1 SUSPENSION/ DROPS OPHTHALMIC 2 TIMES DAILY
COMMUNITY
Start: 2022-10-05 | End: 2022-12-29

## 2022-10-18 NOTE — TELEPHONE ENCOUNTER
Contacted patient to schedule procedure. As per our conversation,  patient is scheduled for Esophageal Manometry on 10/19/22 at 8:00 am and EGD/Endoflip with Bravo on 10/31/22 at 12:20 pm. Instructions reviewed with patient and informed instructions will be on patient portal for review.

## 2022-10-19 ENCOUNTER — TELEPHONE (OUTPATIENT)
Dept: ENDOSCOPY | Facility: HOSPITAL | Age: 72
End: 2022-10-19
Payer: MEDICARE

## 2022-10-19 ENCOUNTER — HOSPITAL ENCOUNTER (OUTPATIENT)
Facility: HOSPITAL | Age: 72
Discharge: HOME | End: 2022-10-19
Attending: INTERNAL MEDICINE | Admitting: INTERNAL MEDICINE
Payer: MEDICARE

## 2022-10-19 VITALS
BODY MASS INDEX: 28.35 KG/M2 | SYSTOLIC BLOOD PRESSURE: 147 MMHG | RESPIRATION RATE: 16 BRPM | DIASTOLIC BLOOD PRESSURE: 72 MMHG | HEIGHT: 63 IN | OXYGEN SATURATION: 98 % | HEART RATE: 48 BPM | TEMPERATURE: 98 F | WEIGHT: 160 LBS

## 2022-10-19 DIAGNOSIS — R13.10 DYSPHAGIA: ICD-10-CM

## 2022-10-19 PROCEDURE — 91010 ESOPHAGUS MOTILITY STUDY: CPT | Mod: TC | Performed by: INTERNAL MEDICINE

## 2022-10-19 PROCEDURE — 25000003 PHARM REV CODE 250: Performed by: INTERNAL MEDICINE

## 2022-10-19 PROCEDURE — 91037 ESOPH IMPED FUNCTION TEST: CPT | Mod: TC | Performed by: INTERNAL MEDICINE

## 2022-10-19 RX ORDER — LIDOCAINE HYDROCHLORIDE 20 MG/ML
JELLY TOPICAL ONCE
Status: COMPLETED | OUTPATIENT
Start: 2022-10-19 | End: 2022-10-19

## 2022-10-19 RX ADMIN — LIDOCAINE HYDROCHLORIDE 10 ML: 20 JELLY TOPICAL at 08:10

## 2022-10-19 NOTE — TELEPHONE ENCOUNTER
Patient called to rescheduled her 10/31 egd w/endo flip and bravo  Pt can come any time in Nov. But not the 1st week , not the 9 and 18  Please call pt to schedule procedure   Thanks

## 2022-10-19 NOTE — PLAN OF CARE
Pt tolerated procedure well. Full protocol followed with 200 cc NS bolas. Pt verbalized understanding of AVS

## 2022-10-24 PROBLEM — M54.32 LEFT SIDED SCIATICA: Status: ACTIVE | Noted: 2022-10-24

## 2022-10-25 ENCOUNTER — PATIENT MESSAGE (OUTPATIENT)
Dept: ENDOSCOPY | Facility: HOSPITAL | Age: 72
End: 2022-10-25
Payer: MEDICARE

## 2022-10-27 ENCOUNTER — TELEPHONE (OUTPATIENT)
Dept: GASTROENTEROLOGY | Facility: CLINIC | Age: 72
End: 2022-10-27
Payer: MEDICARE

## 2022-10-27 PROCEDURE — 91010 ESOPHAGUS MOTILITY STUDY: CPT | Mod: 26,,, | Performed by: INTERNAL MEDICINE

## 2022-10-27 PROCEDURE — 91010 PR ESOPHAGEAL MOTILITY STUDY, MA2METRY: ICD-10-PCS | Mod: 26,,, | Performed by: INTERNAL MEDICINE

## 2022-10-27 PROCEDURE — 91037 PR GERD TST W/ NASAL IMPEDENCE ELECTROD: ICD-10-PCS | Mod: 26,,, | Performed by: INTERNAL MEDICINE

## 2022-10-27 PROCEDURE — 91037 ESOPH IMPED FUNCTION TEST: CPT | Mod: 26,,, | Performed by: INTERNAL MEDICINE

## 2022-10-27 NOTE — TELEPHONE ENCOUNTER
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 Hyde after all studies completed

## 2022-10-27 NOTE — PROVATION PATIENT INSTRUCTIONS
Discharge Summary/Instructions after an Endoscopic Procedure  Patient Name: Abbey Vargas  Patient MRN: [id number]  Patient YOB: 1950 Thursday, October 27, 2022  Jessica Hyde MD  Dear patient,  As a result of recent federal legislation (The Federal Cures Act), you may   receive lab or pathology results from your procedure in your [facility]   account before your physician is able to contact you. Your physician or   their representative will relay the results to you with their   recommendations at their soonest availability.  Thank you,  RESTRICTIONS:  During your procedure today, you received medications for sedation.  These   medications may affect your judgment, balance and coordination.  Therefore,   for 24 hours, you have the following restrictions:   - DO NOT drive a car, operate machinery, make legal/financial decisions,   sign important papers or drink alcohol.    ACTIVITY:  Today: no heavy lifting, straining or running due to procedural   sedation/anesthesia.  The following day: return to full activity including work.  DIET:  Eat and drink normally unless instructed otherwise.     TREATMENT FOR COMMON SIDE EFFECTS:  - Mild abdominal pain, nausea, belching, bloating or excessive gas:  rest,   eat lightly and use a heating pad.  - Sore Throat: treat with throat lozenges and/or gargle with warm salt   water.  - Because air was used during the procedure, expelling large amounts of air   from your rectum or belching is normal.  - If a bowel prep was taken, you may not have a bowel movement for 1-3 days.    This is normal.  SYMPTOMS TO WATCH FOR AND REPORT TO YOUR PHYSICIAN:  1. Abdominal pain or bloating, other than gas cramps.  2. Chest pain.  3. Back pain.  4. Signs of infection such as: chills or fever occurring within 24 hours   after the procedure.  5. Rectal bleeding, which would show as bright red, maroon, or black stools.   (A tablespoon of blood from the rectum is not serious, especially  if   hemorrhoids are present.)  6. Vomiting.  7. Weakness or dizziness.  GO DIRECTLY TO THE NEAREST EMERGENCY ROOM IF YOU HAVE ANY OF THE FOLLOWING:      Difficulty breathing              Chills and/or fever over 101 F   Persistent vomiting and/or vomiting blood   Severe abdominal pain   Severe chest pain   Black, tarry stools   Bleeding- more than one tablespoon   Any other symptom or condition that you feel may need urgent attention  Your doctor recommends these additional instructions:  If any biopsies were taken, your doctors clinic will contact you in 1 to 2   weeks with any results.  For questions, problems or results please call your physician - Jessica Hyde MD at Work:  [phone].  [facility] PHONE NUMBER: [phone]  IF A COMPLICATION OR EMERGENCY SITUATION ARISES AND YOU ARE UNABLE TO REACH   YOUR PHYSICIAN - GO DIRECTLY TO THE EMERGENCY ROOM.  Jessica Hyde MD  10/27/2022 4:05:17 PM  This report has been verified and signed electronically.  Dear patient,  As a result of recent federal legislation (The Federal Cures Act), you may   receive lab or pathology results from your procedure in your [facility]   account before your physician is able to contact you. Your physician or   their representative will relay the results to you with their   recommendations at their soonest availability.  Thank you,  PROVATION

## 2022-11-10 ENCOUNTER — TELEPHONE (OUTPATIENT)
Dept: ENDOSCOPY | Facility: HOSPITAL | Age: 72
End: 2022-11-10
Payer: MEDICARE

## 2022-11-10 ENCOUNTER — PATIENT MESSAGE (OUTPATIENT)
Dept: ENDOSCOPY | Facility: HOSPITAL | Age: 72
End: 2022-11-10
Payer: MEDICARE

## 2022-11-10 NOTE — TELEPHONE ENCOUNTER
Contacted patient to reschedule procedure. As per our conversation,  patient is rescheduled for EGD/Endoflip with Bravo on 11/29/22 at 2:50 pm. Instructions reviewed with patient and informed instructions will be on patient portal for review.

## 2022-11-14 ENCOUNTER — TELEPHONE (OUTPATIENT)
Dept: OTOLARYNGOLOGY | Facility: CLINIC | Age: 72
End: 2022-11-14
Payer: MEDICARE

## 2022-11-14 NOTE — TELEPHONE ENCOUNTER
----- Message from Nathalie Roche sent at 11/14/2022 11:27 AM CST -----  Regarding: appt  Contact: [phone]  Pt is requesting to reschedule her 11/28 appt. Pt is asking if an appt is available for early morning on 11/29. Please call to discuss further.

## 2022-11-28 ENCOUNTER — OFFICE VISIT (OUTPATIENT)
Dept: OTOLARYNGOLOGY | Facility: CLINIC | Age: 72
End: 2022-11-28
Payer: MEDICARE

## 2022-11-28 VITALS — HEART RATE: 55 BPM | DIASTOLIC BLOOD PRESSURE: 69 MMHG | SYSTOLIC BLOOD PRESSURE: 116 MMHG

## 2022-11-28 DIAGNOSIS — R49.0 HOARSENESS: ICD-10-CM

## 2022-11-28 DIAGNOSIS — J38.5 LARYNGEAL SPASM: ICD-10-CM

## 2022-11-28 DIAGNOSIS — K21.9 GASTROESOPHAGEAL REFLUX DISEASE, UNSPECIFIED WHETHER ESOPHAGITIS PRESENT: ICD-10-CM

## 2022-11-28 DIAGNOSIS — R49.0 DYSPHONIA: Primary | ICD-10-CM

## 2022-11-28 PROCEDURE — 3078F PR MOST RECENT DIASTOLIC BLOOD PRESSURE < 80 MM HG: ICD-10-PCS | Mod: CPTII,S$GLB,, | Performed by: OTOLARYNGOLOGY

## 2022-11-28 PROCEDURE — 1160F PR REVIEW ALL MEDS BY PRESCRIBER/CLIN PHARMACIST DOCUMENTED: ICD-10-PCS | Mod: CPTII,S$GLB,, | Performed by: OTOLARYNGOLOGY

## 2022-11-28 PROCEDURE — 1157F PR ADVANCE CARE PLAN OR EQUIV PRESENT IN MEDICAL RECORD: ICD-10-PCS | Mod: CPTII,S$GLB,, | Performed by: OTOLARYNGOLOGY

## 2022-11-28 PROCEDURE — 3074F PR MOST RECENT SYSTOLIC BLOOD PRESSURE < 130 MM HG: ICD-10-PCS | Mod: CPTII,S$GLB,, | Performed by: OTOLARYNGOLOGY

## 2022-11-28 PROCEDURE — 1157F ADVNC CARE PLAN IN RCRD: CPT | Mod: CPTII,S$GLB,, | Performed by: OTOLARYNGOLOGY

## 2022-11-28 PROCEDURE — 1126F PR PAIN SEVERITY QUANTIFIED, NO PAIN PRESENT: ICD-10-PCS | Mod: CPTII,S$GLB,, | Performed by: OTOLARYNGOLOGY

## 2022-11-28 PROCEDURE — 4010F PR ACE/ARB THEARPY RXD/TAKEN: ICD-10-PCS | Mod: CPTII,S$GLB,, | Performed by: OTOLARYNGOLOGY

## 2022-11-28 PROCEDURE — 99999 PR PBB SHADOW E&M-EST. PATIENT-LVL V: ICD-10-PCS | Mod: PBBFAC,,, | Performed by: OTOLARYNGOLOGY

## 2022-11-28 PROCEDURE — 1159F MED LIST DOCD IN RCRD: CPT | Mod: CPTII,S$GLB,, | Performed by: OTOLARYNGOLOGY

## 2022-11-28 PROCEDURE — 99204 OFFICE O/P NEW MOD 45 MIN: CPT | Mod: 25,S$GLB,, | Performed by: OTOLARYNGOLOGY

## 2022-11-28 PROCEDURE — 3074F SYST BP LT 130 MM HG: CPT | Mod: CPTII,S$GLB,, | Performed by: OTOLARYNGOLOGY

## 2022-11-28 PROCEDURE — 99204 PR OFFICE/OUTPT VISIT, NEW, LEVL IV, 45-59 MIN: ICD-10-PCS | Mod: 25,S$GLB,, | Performed by: OTOLARYNGOLOGY

## 2022-11-28 PROCEDURE — 1160F RVW MEDS BY RX/DR IN RCRD: CPT | Mod: CPTII,S$GLB,, | Performed by: OTOLARYNGOLOGY

## 2022-11-28 PROCEDURE — 99999 PR PBB SHADOW E&M-EST. PATIENT-LVL V: CPT | Mod: PBBFAC,,, | Performed by: OTOLARYNGOLOGY

## 2022-11-28 PROCEDURE — 31579 PR LARYNGOSCOPY, FLEX/RIGID TELESCOPIC, W/STROBOSCOPY: ICD-10-PCS | Mod: S$GLB,,, | Performed by: OTOLARYNGOLOGY

## 2022-11-28 PROCEDURE — 1126F AMNT PAIN NOTED NONE PRSNT: CPT | Mod: CPTII,S$GLB,, | Performed by: OTOLARYNGOLOGY

## 2022-11-28 PROCEDURE — 1159F PR MEDICATION LIST DOCUMENTED IN MEDICAL RECORD: ICD-10-PCS | Mod: CPTII,S$GLB,, | Performed by: OTOLARYNGOLOGY

## 2022-11-28 PROCEDURE — 31579 LARYNGOSCOPY TELESCOPIC: CPT | Mod: S$GLB,,, | Performed by: OTOLARYNGOLOGY

## 2022-11-28 PROCEDURE — 4010F ACE/ARB THERAPY RXD/TAKEN: CPT | Mod: CPTII,S$GLB,, | Performed by: OTOLARYNGOLOGY

## 2022-11-28 PROCEDURE — 3078F DIAST BP <80 MM HG: CPT | Mod: CPTII,S$GLB,, | Performed by: OTOLARYNGOLOGY

## 2022-11-28 NOTE — PROGRESS NOTES
"[facility]  Department of Otorhinolaryngology and Communication Sciences    Abbey Vargas is a 72 y.o. female who [facility] the [facility] for consultation at the kind request of Dr. Jessica Hyde for further management of hoarseness.     She complains of hoarse voice, difficulty talking and singing.  Reports her throat feels "raw". Onset was gradual. Duration is several years. Time course is constant. Symptoms are worsening. She denies any exacerbating factors. She denies any alleviating factors. Associated symptoms include dysphagia to solid food.  She reports swallowing is also uncomfortable because her throat feels raw.     She does not work outside the home.  She notices the hoarseness all day.  She reports she does not ever sound normal.  She recalls doing some voice therapy in [facility] prior to Covid - she does not recall her voice improving but does remember it helped reduce her chronic throat clearing.     Esophagram May 2022:  Some poor clearing of the esophagus and severe gastroesophageal reflux; no fixed or dynamic UES obstruction    She is taking protonix following her esophagram - she reports it did not help her throat discomfort at all.  She has been off it for about a week due to her upcoming Bravo.  Has not noticed a change in her symptoms since being off it.     Past Medical History  She has a past medical history of Arthritis, Carpal tunnel syndrome of right wrist, CKD (chronic kidney disease) stage 3, GFR 30-59 ml/min, COVID-19 vaccine administered, Depression, Diverticulosis, Environmental allergies, Hemorrhoids, High cholesterol, Hypertension, Hypoactive thyroid, Muscle pain, Osteoporosis, Ptosis, Sleep apnea, and Traumatic tear of supraspinatus tendon of right shoulder.    Past Surgical History  She has a past surgical history that includes Ganglion cyst excision (Right); Fracture surgery (Left); Hemorrhoid surgery; Carpal tunnel release (Right); Cataract extraction " (Left, 03/21/2017); Stapedectomy (Left, 05/16/2017); Knee surgery (Right, 02/07/2018); Knee arthroscopy (Right, 06/09/2017); Colonoscopy; Esophagogastroduodenoscopy; Colonoscopy (N/A, 7/8/2020); Shoulder arthroscopy (Left, 9/24/2020); Arthroscopy of shoulder with removal of distal clavicle (Left, 9/24/2020); Arthroscopy of shoulder with decompression of subacromial space (Left, 9/24/2020); Arthroscopic debridement of rotator cuff (Left, 9/24/2020); Arthroscopic tenotomy of biceps tendon (Left, 9/24/2020); Esophagogastroduodenoscopy (N/A, 6/14/2022); and Esophageal manometry with measurement of impedance (N/A, 10/19/2022).    Family History  Her family history includes Arthritis in her mother; Breast cancer in her maternal grandmother and mother; Cancer in her mother and sister; Cancer (age of onset: 54) in her father; Cataracts in her mother; Colon cancer (age of onset: 58) in her father; Colon polyps in her sister and sister; Glaucoma in her mother; Hypertension in her mother; Scleroderma in her mother.    Social History  She reports that she has never smoked. She has never used smokeless tobacco. She reports current alcohol use. She reports that she does not use drugs.    Allergies  She has No Known Allergies.    Medications  She has a current medication list which includes the following prescription(s): alendronate, aspirin, breo ellipta, calcium-vitamin d3, fish oil-omega-3 fatty acids, glucosamine sulfate, levothyroxine, lidocaine, lisinopril, multivitamin, pantoprazole, pravastatin, and tobradex st, and the following Facility-Administered Medications: sodium chloride 0.9%, ondansetron, and promethazine (PHENERGAN) 6.25 mg in dextrose 5 % 50 mL IVPB.    Review of Systems   Constitutional:  Negative for fever.   HENT:  Positive for hearing loss, sore throat, trouble swallowing and voice change.    Eyes: Negative.  Negative for visual disturbance.   Respiratory:  Negative for wheezing.    Cardiovascular:  Negative.  Negative for chest pain.   Gastrointestinal:  Negative for nausea.   Endocrine: Negative.    Genitourinary: Negative.    Musculoskeletal:  Negative for arthralgias.   Skin: Negative.  Negative for rash.   Allergic/Immunologic: Negative.    Neurological: Negative.  Negative for tremors.   Hematological:  Bruises/bleeds easily.   Psychiatric/Behavioral: Negative.  The patient is not nervous/anxious.         Objective:     VS reviewed     Physical Exam    Constitutional: comfortable, well dressed  Psychiatric: appropriate affect  Respiratory: comfortably breathing, symmetric chest rise, no stridor  Voice: creak/santamaria; strain with projection  Cardiovascular: upper extremities non-edematous  Lymphatic: no cervical lymphadenopathy  Neurologic: alert and oriented to time, place, person, and situation; cranial nerves 3-12 grossly intact  Head: normocephalic  Eyes: conjunctivae and sclerae clear  Ears: normal pinnae, normal external auditory canals, tympanic membranes intact  Nose: mucosa pink and noncongested, no masses, no mucopurulence, no polyps  Oral cavity / oropharynx: no mucosal lesions  Neck: soft, full range of motion, laryngotracheal complex palpable with appropriate landmarks, larynx elevates on swallowing  Indirect laryngoscopy: limited due to gag    Procedure  Rigid Laryngeal Videostroboscopy (70941): Laryngeal videostroboscopy is indicated to assess the laryngeal vibratory biomechanics and vocal fold oscillation, which cannot be assessed with a plain light examination. This was carried out with a 70 degree endoscope. After verbal consent was obtained, the patient was positioned and the tongue was gently secured with a gauze sponge. The endoscope was passed transorally and positioned to image the larynx and hypopharynx in detail. The following features were examined: laryngeal and hypopharyngeal masses; vocal fold range and symmetry of motion; laryngeal mucosal edema, erythema, inflammation, and  hydration; salivary pooling; and gross laryngeal sensation. During phonation, the vocal folds were assessed for glottal closure; mucosal wave; vocal fold lesions; vibratory periodicity, amplitude, and phase symmetry; and vertical height match. The equipment was removed. The patient tolerated the procedure well without complication. All findings were normal except:  - mild bilateral vocal fold atrophy  - thick dry scattered endolarynegal mucus  - pliability intact and symmetric  - faint variable insufficiency, primarily with an anterior gap  - variable suprgalottic hyperfunction      Assessment:     Abbey Vargas is a 72 y.o. female with mild vocal fold atrophy, suboptimal laryngeal hygiene, and muscle tension dysphonia, against a background of reflux.       Plan:        I had a discussion with the patient regarding her condition and the further workup and management options.      I educated the patient on the potential impact of reflux on laryngopharyngeal disorders. After she has completed her pH testing, she may benefit from a trial of alginate therapy.     SLP voice evaluation and subsequent therapy sessions are medically necessary for restoration of laryngeal function. We will arrange for this to occur with our team via telemedicine in the coming weeks.    She will follow up with me as needed.    All questions were answered, and the patient is in agreement with the above.     Wili Harding M.D.  [facility]  Department of Otorhinolaryngology and Communication Sciences

## 2022-11-28 NOTE — PATIENT INSTRUCTIONS
REFLUX GOURMET - take after meals and before bed          WHAT TO EXPECT FROM VOICE THERAPY    Purpose  The purpose of voice therapy is to help you find a better, more efficient way to use your voice or to reduce symptoms such as coughing, throat clearing, or difficulty breathing.  Depending on your symptoms, you may learn how to produce clearer voice quality, how to reduce fatigue or pain associated with speaking, how to take care of your voice, and how to eliminate chronic coughing or throat clearing.      Process: Evaluation  First, you may go through some initial testing.  In most cases, a videostroboscopy will be performed in order to allow your speech pathologist and your physician to look at your vocal cords to aid in deciding if you would benefit from voice therapy.  Next, you may work with the speech pathologist to assess the current capabilities of your voice.  Following evaluation, your speech pathologist will design a therapeutic plan to improve your voice as well as other symptoms that may bother you.  At the time of evaluation, your speech pathologist may provide you with exercises to try at home.      Process: Therapy  Most patients benefit from 2-8 sessions over 1-3 months.  Voice therapy involves changing the behavior of your vocal cords and speaking habits, so it is very important that you attend your appointments and do home practices as instructed by your speech pathologist.  Home practice and participation in therapy are critical to meeting your desired voice goals, so of course, we are able to work with you to schedule appointments that are convenient for you.

## 2022-11-29 ENCOUNTER — HOSPITAL ENCOUNTER (OUTPATIENT)
Facility: HOSPITAL | Age: 72
Discharge: HOME | End: 2022-11-29
Attending: INTERNAL MEDICINE | Admitting: INTERNAL MEDICINE
Payer: MEDICARE

## 2022-11-29 ENCOUNTER — ANESTHESIA EVENT (OUTPATIENT)
Dept: ENDOSCOPY | Facility: HOSPITAL | Age: 72
End: 2022-11-29
Payer: MEDICARE

## 2022-11-29 ENCOUNTER — ANESTHESIA (OUTPATIENT)
Dept: ENDOSCOPY | Facility: HOSPITAL | Age: 72
End: 2022-11-29
Payer: MEDICARE

## 2022-11-29 VITALS
RESPIRATION RATE: 16 BRPM | OXYGEN SATURATION: 100 % | HEART RATE: 59 BPM | SYSTOLIC BLOOD PRESSURE: 104 MMHG | DIASTOLIC BLOOD PRESSURE: 69 MMHG | HEIGHT: 63 IN | TEMPERATURE: 98 F | BODY MASS INDEX: 28.17 KG/M2 | WEIGHT: 159 LBS

## 2022-11-29 DIAGNOSIS — R13.10 DYSPHAGIA: ICD-10-CM

## 2022-11-29 DIAGNOSIS — R13.10 DYSPHAGIA, UNSPECIFIED TYPE: Primary | ICD-10-CM

## 2022-11-29 PROCEDURE — 37000009 HC ANESTHESIA EA ADD 15 MINS: Performed by: INTERNAL MEDICINE

## 2022-11-29 PROCEDURE — E9220 PRA ENDO ANESTHESIA: ICD-10-PCS | Mod: ,,, | Performed by: NURSE ANESTHETIST, CERTIFIED REGISTERED

## 2022-11-29 PROCEDURE — 43239 EGD BIOPSY SINGLE/MULTIPLE: CPT | Mod: ,,, | Performed by: INTERNAL MEDICINE

## 2022-11-29 PROCEDURE — 88305 TISSUE EXAM BY PATHOLOGIST: CPT | Mod: 26,,, | Performed by: PATHOLOGY

## 2022-11-29 PROCEDURE — 91040 ESOPH BALLOON DISTENSION TST: CPT | Mod: 26,,, | Performed by: INTERNAL MEDICINE

## 2022-11-29 PROCEDURE — 43239 PR EGD, FLEX, W/BIOPSY, SGL/MULTI: ICD-10-PCS | Mod: ,,, | Performed by: INTERNAL MEDICINE

## 2022-11-29 PROCEDURE — 37000008 HC ANESTHESIA 1ST 15 MINUTES: Performed by: INTERNAL MEDICINE

## 2022-11-29 PROCEDURE — 88305 TISSUE EXAM BY PATHOLOGIST: CPT | Mod: 59 | Performed by: PATHOLOGY

## 2022-11-29 PROCEDURE — 27201012 HC FORCEPS, HOT/COLD, DISP: Performed by: INTERNAL MEDICINE

## 2022-11-29 PROCEDURE — E9220 PRA ENDO ANESTHESIA: HCPCS | Mod: ,,, | Performed by: NURSE ANESTHETIST, CERTIFIED REGISTERED

## 2022-11-29 PROCEDURE — 25000003 PHARM REV CODE 250: Performed by: NURSE ANESTHETIST, CERTIFIED REGISTERED

## 2022-11-29 PROCEDURE — 91040 ESOPH BALLOON DISTENSION TST: CPT | Performed by: INTERNAL MEDICINE

## 2022-11-29 PROCEDURE — 91040 PR ESOPH BALLOON DISTENSION TST: ICD-10-PCS | Mod: 26,,, | Performed by: INTERNAL MEDICINE

## 2022-11-29 PROCEDURE — C1726 CATH, BAL DIL, NON-VASCULAR: HCPCS

## 2022-11-29 PROCEDURE — 43239 EGD BIOPSY SINGLE/MULTIPLE: CPT | Performed by: INTERNAL MEDICINE

## 2022-11-29 PROCEDURE — 88305 TISSUE EXAM BY PATHOLOGIST: ICD-10-PCS | Mod: 26,,, | Performed by: PATHOLOGY

## 2022-11-29 PROCEDURE — 63600175 PHARM REV CODE 636 W HCPCS: Performed by: NURSE ANESTHETIST, CERTIFIED REGISTERED

## 2022-11-29 PROCEDURE — 91035 G-ESOPH REFLX TST W/ELECTROD: CPT | Mod: TC | Performed by: INTERNAL MEDICINE

## 2022-11-29 PROCEDURE — 27200942: Performed by: INTERNAL MEDICINE

## 2022-11-29 RX ORDER — SODIUM CHLORIDE 9 MG/ML
INJECTION, SOLUTION INTRAVENOUS CONTINUOUS
Status: DISCONTINUED | OUTPATIENT
Start: 2022-11-29 | End: 2022-11-29 | Stop reason: HOSPADM

## 2022-11-29 RX ORDER — PROPOFOL 10 MG/ML
VIAL (ML) INTRAVENOUS CONTINUOUS PRN
Status: DISCONTINUED | OUTPATIENT
Start: 2022-11-29 | End: 2022-11-29

## 2022-11-29 RX ORDER — PROPOFOL 10 MG/ML
VIAL (ML) INTRAVENOUS
Status: DISCONTINUED | OUTPATIENT
Start: 2022-11-29 | End: 2022-11-29

## 2022-11-29 RX ORDER — LIDOCAINE HYDROCHLORIDE 20 MG/ML
INJECTION INTRAVENOUS
Status: DISCONTINUED | OUTPATIENT
Start: 2022-11-29 | End: 2022-11-29

## 2022-11-29 RX ORDER — PHENYLEPHRINE HYDROCHLORIDE 10 MG/ML
INJECTION INTRAVENOUS
Status: DISCONTINUED | OUTPATIENT
Start: 2022-11-29 | End: 2022-11-29

## 2022-11-29 RX ORDER — SODIUM CHLORIDE 0.9 % (FLUSH) 0.9 %
10 SYRINGE (ML) INJECTION
Status: DISCONTINUED | OUTPATIENT
Start: 2022-11-29 | End: 2022-11-29 | Stop reason: HOSPADM

## 2022-11-29 RX ADMIN — PHENYLEPHRINE HYDROCHLORIDE 100 MCG: 10 INJECTION INTRAVENOUS at 04:11

## 2022-11-29 RX ADMIN — SODIUM CHLORIDE: 0.9 INJECTION, SOLUTION INTRAVENOUS at 03:11

## 2022-11-29 RX ADMIN — PROPOFOL 100 MG: 10 INJECTION, EMULSION INTRAVENOUS at 04:11

## 2022-11-29 RX ADMIN — LIDOCAINE HYDROCHLORIDE 100 MG: 20 INJECTION INTRAVENOUS at 04:11

## 2022-11-29 RX ADMIN — PHENYLEPHRINE HYDROCHLORIDE 200 MCG: 10 INJECTION INTRAVENOUS at 04:11

## 2022-11-29 RX ADMIN — Medication 175 MCG/KG/MIN: at 04:11

## 2022-11-29 RX ADMIN — GLYCOPYRROLATE 0.1 MG: 0.2 INJECTION, SOLUTION INTRAMUSCULAR; INTRAVITREAL at 04:11

## 2022-11-29 NOTE — H&P
Short Stay Endoscopy History and Physical    PCP - Katharine Beltran NP     Procedure - EGD/Flip  ASA - per anesthesia  Mallampati - per anesthesia  History of Anesthesia problems - no  Family history Anesthesia problems -  no   Plan of anesthesia - General    HPI:  This is a 72 y.o. female here for evaluation of dysphagia, gerd  :        Medical History:  has a past medical history of Arthritis, Carpal tunnel syndrome of right wrist (2000), CKD (chronic kidney disease) stage 3, GFR 30-59 ml/min (01/30/2018), COVID-19 vaccine administered, Depression, Diverticulosis, Environmental allergies, Hemorrhoids, High cholesterol, Hypertension, Hypoactive thyroid, Muscle pain, Osteoporosis, Ptosis, Sleep apnea (2000), and Traumatic tear of supraspinatus tendon of right shoulder.    Surgical History:  has a past surgical history that includes Ganglion cyst excision (Right); Fracture surgery (Left); Hemorrhoid surgery; Carpal tunnel release (Right); Cataract extraction (Left, 03/21/2017); Stapedectomy (Left, 05/16/2017); Knee surgery (Right, 02/07/2018); Knee arthroscopy (Right, 06/09/2017); Colonoscopy; Esophagogastroduodenoscopy; Colonoscopy (N/A, 7/8/2020); Shoulder arthroscopy (Left, 9/24/2020); Arthroscopy of shoulder with removal of distal clavicle (Left, 9/24/2020); Arthroscopy of shoulder with decompression of subacromial space (Left, 9/24/2020); Arthroscopic debridement of rotator cuff (Left, 9/24/2020); Arthroscopic tenotomy of biceps tendon (Left, 9/24/2020); Esophagogastroduodenoscopy (N/A, 6/14/2022); and Esophageal manometry with measurement of impedance (N/A, 10/19/2022).    Family History: family history includes Arthritis in her mother; Breast cancer in her maternal grandmother and mother; Cancer in her mother and sister; Cancer (age of onset: 54) in her father; Cataracts in her mother; Colon cancer (age of onset: 58) in her father; Colon polyps in her sister and sister; Glaucoma in her mother; Hypertension in  her mother; Scleroderma in her mother.. Otherwise no colon cancer, inflammatory bowel disease, or GI malignancies.    Social History:  reports that she has never smoked. She has never used smokeless tobacco. She reports current alcohol use. She reports that she does not use drugs.    Review of patient's allergies indicates:  No Known Allergies    Medications:   Medications Prior to Admission   Medication Sig Dispense Refill Last Dose    alendronate (FOSAMAX) 70 MG tablet TAKE 1 TABLET BY MOUTH ON EMPTY STOMACH EVERY 7 DAYS W/ 8 OZ WATER.SIT UPRIGHT AT LEAST 30 MIN AFTER 12 tablet 3     aspirin (ECOTRIN) 81 MG EC tablet Take 81 mg by mouth once daily.       BREO ELLIPTA 200-25 mcg/dose DsDv diskus inhaler INHALE 1 PUFF INTO THE LUNGS ONCE DAILY. 60 each 5     calcium-vitamin D3 (OS- + D3) 500 mg(1,250mg) -200 unit per tablet        fish oil-omega-3 fatty acids 300-1,000 mg capsule Take 2 g by mouth once daily.       glucosamine sulfate 750 mg Tab Take 750 mg by mouth once daily.       levothyroxine (SYNTHROID) 100 MCG tablet TAKE 1 TABLET BY MOUTH EVERY DAY 90 tablet 3     LIDOcaine (LIDODERM) 5 % Place 1 patch onto the skin once daily. Remove & Discard patch within 12 hours or as directed by MD 7 patch 0     lisinopriL 10 MG tablet Take 1 tablet (10 mg total) by mouth every evening. 90 tablet 3     multivitamin (THERAGRAN) per tablet Take 1 tablet by mouth once daily.       pantoprazole (PROTONIX) 40 MG tablet Take 40 mg by mouth 2 (two) times daily.       pravastatin (PRAVACHOL) 80 MG tablet TAKE 1 TABLET BY MOUTH EVERY EVENING 90 tablet 3     TOBRADEX ST 0.3-0.05 % DrpS Place 1 drop into the right eye 2 (two) times a day.          Physical Exam:    Vital Signs: There were no vitals filed for this visit.    I have explained the risks and benefits of endoscopy procedures to the patient including but not limited to bleeding, perforation, infection, and death.      Jessica Hyde MD

## 2022-11-29 NOTE — PROVATION PATIENT INSTRUCTIONS
Discharge Summary/Instructions after an Endoscopic Procedure  Patient Name: Abbey Vargas  Patient MRN: [id number]  Patient YOB: 1950 Tuesday, November 29, 2022  Jessica Hyde MD  Dear patient,  As a result of recent federal legislation (The Federal Cures Act), you may   receive lab or pathology results from your procedure in your [facility]   account before your physician is able to contact you. Your physician or   their representative will relay the results to you with their   recommendations at their soonest availability.  Thank you,  RESTRICTIONS:  During your procedure today, you received medications for sedation.  These   medications may affect your judgment, balance and coordination.  Therefore,   for 24 hours, you have the following restrictions:   - DO NOT drive a car, operate machinery, make legal/financial decisions,   sign important papers or drink alcohol.    ACTIVITY:  Today: no heavy lifting, straining or running due to procedural   sedation/anesthesia.  The following day: return to full activity including work.  DIET:  Eat and drink normally unless instructed otherwise.     TREATMENT FOR COMMON SIDE EFFECTS:  - Mild abdominal pain, nausea, belching, bloating or excessive gas:  rest,   eat lightly and use a heating pad.  - Sore Throat: treat with throat lozenges and/or gargle with warm salt   water.  - Because air was used during the procedure, expelling large amounts of air   from your rectum or belching is normal.  - If a bowel prep was taken, you may not have a bowel movement for 1-3 days.    This is normal.  SYMPTOMS TO WATCH FOR AND REPORT TO YOUR PHYSICIAN:  1. Abdominal pain or bloating, other than gas cramps.  2. Chest pain.  3. Back pain.  4. Signs of infection such as: chills or fever occurring within 24 hours   after the procedure.  5. Rectal bleeding, which would show as bright red, maroon, or black stools.   (A tablespoon of blood from the rectum is not serious, especially  if   hemorrhoids are present.)  6. Vomiting.  7. Weakness or dizziness.  GO DIRECTLY TO THE NEAREST EMERGENCY ROOM IF YOU HAVE ANY OF THE FOLLOWING:      Difficulty breathing              Chills and/or fever over 101 F   Persistent vomiting and/or vomiting blood   Severe abdominal pain   Severe chest pain   Black, tarry stools   Bleeding- more than one tablespoon   Any other symptom or condition that you feel may need urgent attention  Your doctor recommends these additional instructions:  If any biopsies were taken, your doctors clinic will contact you in 1 to 2   weeks with any results.  - Discharge patient to home (with escort).   - Resume previous diet.   - Continue present medications.   - Await pathology results.   - The findings and recommendations were discussed with the patient.   - Return to my office as previously scheduled.  For questions, problems or results please call your physician - Jessica Hyde MD at Work:  [phone].  [facility]ONE NUMBER: [phone]  IF A COMPLICATION OR EMERGENCY SITUATION ARISES AND YOU ARE UNABLE TO REACH   YOUR PHYSICIAN - GO DIRECTLY TO THE EMERGENCY ROOM.  Jessica Hyde MD  11/29/2022 4:41:59 PM  This report has been verified and signed electronically.  Dear patient,  As a result of recent federal legislation (The Federal Cures Act), you may   receive lab or pathology results from your procedure in your [facility]   account before your physician is able to contact you. Your physician or   their representative will relay the results to you with their   recommendations at their soonest availability.  Thank you,  PROVATION

## 2022-11-29 NOTE — ANESTHESIA PREPROCEDURE EVALUATION
11/29/2022  Abbey Vargas is a 72 y.o., female.    Past Medical History:   Diagnosis Date    Arthritis     Carpal tunnel syndrome of right wrist 2000    [place] [facility] [place]    CKD (chronic kidney disease) stage 3, GFR 30-59 ml/min 01/30/2018    COVID-19 vaccine administered     vaccine and booster    Depression     Diverticulosis     Environmental allergies     Hemorrhoids     High cholesterol     Hypertension     Hypoactive thyroid     Muscle pain     Osteoporosis     Ptosis     Sleep apnea 2000    [place] [facility] [place]    Traumatic tear of supraspinatus tendon of right shoulder      Past Surgical History:   Procedure Laterality Date    ARTHROSCOPIC DEBRIDEMENT OF ROTATOR CUFF Left 9/24/2020    Procedure: DEBRIDEMENT, ROTATOR CUFF, ARTHROSCOPIC;  Surgeon: Mg Orozco MD;  Location: [facility];  Service: Orthopedics;  Laterality: Left;    ARTHROSCOPIC TENOTOMY OF BICEPS TENDON Left 9/24/2020    Procedure: TENOTOMY, BICEPS, ARTHROSCOPIC;  Surgeon: Mg Orozco MD;  Location: [facility];  Service: Orthopedics;  Laterality: Left;    ARTHROSCOPY OF SHOULDER WITH DECOMPRESSION OF SUBACROMIAL SPACE Left 9/24/2020    Procedure: ARTHROSCOPY, SHOULDER, WITH SUBACROMIAL SPACE DECOMPRESSION;  Surgeon: Mg Orozco MD;  Location: [facility];  Service: Orthopedics;  Laterality: Left;    ARTHROSCOPY OF SHOULDER WITH REMOVAL OF DISTAL CLAVICLE Left 9/24/2020    Procedure: ARTHROSCOPY, SHOULDER, WITH DISTAL CLAVICLE EXCISION;  Surgeon: Mg Orozco MD;  Location: [facility];  Service: Orthopedics;  Laterality: Left;    CARPAL TUNNEL RELEASE Right     CATARACT EXTRACTION Left 03/21/2017    COLONOSCOPY      COLONOSCOPY N/A 7/8/2020    Procedure: COLONOSCOPY;  Surgeon: Julia Meyer MD;  Location: [facility];  Service: Endoscopy;  Laterality: N/A;    ESOPHAGEAL MANOMETRY WITH  MEASUREMENT OF IMPEDANCE N/A 10/19/2022    Procedure: MANOMETRY-ESOPHAGEAL-WITH IMPEDANCE;  Surgeon: Jessica Hyde MD;  Location: [facility] ENDO (4TH FLR);  Service: Endoscopy;  Laterality: N/A;  fully vaccinated, instructions sent to [id number]    ESOPHAGOGASTRODUODENOSCOPY      ESOPHAGOGASTRODUODENOSCOPY N/A 6/14/2022    Procedure: EGD (ESOPHAGOGASTRODUODENOSCOPY);  Surgeon: Jeff Barajas MD;  Location: [facility] ENDO;  Service: Endoscopy;  Laterality: N/A;    FRACTURE SURGERY Left     arm    GANGLION CYST EXCISION Right     wrist    HEMORRHOID SURGERY      KNEE ARTHROSCOPY Right 06/09/2017    KNEE SURGERY Right 02/07/2018    TKR    SHOULDER ARTHROSCOPY Left 9/24/2020    Procedure: ARTHROSCOPY, SHOULDER;  Surgeon: Mg Orozco MD;  Location[facility];  Service: Orthopedics;  Laterality: Left;    STAPEDECTOMY Left 05/16/2017         Pre-op Assessment    I have reviewed the Patient Summary Reports.     I have reviewed the Nursing Notes. I have reviewed the NPO Status.   I have reviewed the Medications.     Review of Systems  Anesthesia Hx:  No problems with previous Anesthesia  Denies Family Hx of Anesthesia complications.   Denies Personal Hx of Anesthesia complications.   Social:  Non-Smoker    Hematology/Oncology:  Hematology Normal   Oncology Normal     EENT/Dental:EENT/Dental Normal   Cardiovascular:   Hypertension    Pulmonary:   Shortness of breath Sleep Apnea    Renal/:   Chronic Renal Disease    Hepatic/GI:  Hepatic/GI Normal    Musculoskeletal:   Arthritis     Neurological:   Neuromuscular Disease,    Endocrine:   Hypothyroidism    Dermatological:  Skin Normal    Psych:   Psychiatric History          Physical Exam  General: Well nourished, Cooperative, Alert and Oriented    Airway:  Mallampati: II / I  Mouth Opening: Normal  TM Distance: Normal  Tongue: Normal  Neck ROM: Normal ROM    Dental:  Intact        Anesthesia Plan  Type of Anesthesia, risks & benefits discussed:    Anesthesia Type:  Gen Natural Airway  Intra-op Monitoring Plan: Standard ASA Monitors  Post Op Pain Control Plan: multimodal analgesia  Induction:  IV  Informed Consent: Informed consent signed with the Patient and all parties understand the risks and agree with anesthesia plan.  All questions answered.   ASA Score: 2  Day of Surgery Review of History & Physical: H&P Update referred to the surgeon/provider.    Ready For Surgery From Anesthesia Perspective.     .

## 2022-11-29 NOTE — TRANSFER OF CARE
"Anesthesia Transfer of Care Note    Patient: Abbey Vargas    Procedure(s) Performed: Procedure(s) (LRB):  ESOPHAGOGASTRODUODENOSCOPY (EGD) with BRAVO (N/A)  PH MONITORING, ESOPHAGUS, WIRELESS, (OFF REFLUX MEDS) (N/A)    Patient location: GI    Anesthesia Type: general    Transport from OR: Transported from OR on room air with adequate spontaneous ventilation    Post pain: adequate analgesia    Post assessment: no apparent anesthetic complications    Post vital signs: stable    Level of consciousness: awake and alert    Nausea/Vomiting: no nausea/vomiting    Complications: none    Transfer of care protocol was followed      Last vitals:   Visit Vitals  BP (!) 99/58 (BP Location: Left arm, Patient Position: Lying)   Pulse 61   Temp 36.4 °C (97.5 °F) (Temporal)   Resp 14   Ht 5' 3" (1.6 m)   Wt 72.1 kg (159 lb)   SpO2 98%   Breastfeeding No   BMI 28.17 kg/m²     "

## 2022-11-30 NOTE — ANESTHESIA POSTPROCEDURE EVALUATION
Anesthesia Post Evaluation    Patient: Abbey Vargas    Procedure(s) Performed: Procedure(s) (LRB):  ESOPHAGOGASTRODUODENOSCOPY (EGD) with BRAVO (N/A)  PH MONITORING, ESOPHAGUS, WIRELESS, (OFF REFLUX MEDS) (N/A)    Final Anesthesia Type: general      Patient location during evaluation: GI PACU  Patient participation: Yes- Able to Participate  Level of consciousness: awake and alert and awake  Post-procedure vital signs: reviewed and stable  Pain management: adequate  Airway patency: patent  RAULITO mitigation strategies: Postoperative administration of CPAP, nasopharyngeal airway, or oral appliance in the postanesthesia care unit (PACU)  PONV status at discharge: No PONV  Anesthetic complications: no      Cardiovascular status: blood pressure returned to baseline  Respiratory status: spontaneous ventilation, unassisted and room air  Hydration status: euvolemic  Follow-up not needed.          Vitals Value Taken Time   /69 11/29/22 1712   Temp 36.4 °C (97.5 °F) 11/29/22 1646   Pulse 59 11/29/22 1712   Resp 16 11/29/22 1712   SpO2 100 % 11/29/22 1712         No case tracking events are documented in the log.      Pain/Lauryn Score: Lauryn Score: 10 (11/29/2022  5:12 PM)

## 2022-12-02 ENCOUNTER — TELEPHONE (OUTPATIENT)
Dept: SPEECH THERAPY | Facility: HOSPITAL | Age: 72
End: 2022-12-02
Payer: MEDICARE

## 2022-12-06 ENCOUNTER — TELEPHONE (OUTPATIENT)
Dept: GASTROENTEROLOGY | Facility: CLINIC | Age: 72
End: 2022-12-06
Payer: MEDICARE

## 2022-12-06 PROCEDURE — 91035 PR GERD TST W/ MUCOS PH ELECTROD: ICD-10-PCS | Mod: 26,,, | Performed by: INTERNAL MEDICINE

## 2022-12-06 PROCEDURE — 91035 G-ESOPH REFLX TST W/ELECTROD: CPT | Mod: 26,,, | Performed by: INTERNAL MEDICINE

## 2022-12-06 NOTE — TELEPHONE ENCOUNTER
BRAVO Results:    Normal pH study  No significant acid reflux off pPI   Total percent time pH less than 4 (< 4.5; <1 if PPI BID): 2  Percent time pH less than 4 Upright (<8.4; <1.5 if PPI BID): 2.6  Percent time pH less than 4 Recumbent (<3.5; 0.5 if PPI BID):  0  DeMeester Score (<14.7): 7  Poor correlation between cough and reflux episodes    Please let patient know that the BRAVO shows    No acid reflux.     Recommendation:  Stop PPI     Follow up with Dr. Hyde after all studies completed

## 2022-12-06 NOTE — PROVATION PATIENT INSTRUCTIONS
Discharge Summary/Instructions after an Endoscopic Procedure  Patient Name: Abbey Vargas  Patient MRN: [id number]  Patient YOB: 1950 Tuesday, December 6, 2022  Jessica Hyde MD  Dear patient,  As a result of recent federal legislation (The Federal Cures Act), you may   receive lab or pathology results from your procedure in your [facility]   account before your physician is able to contact you. Your physician or   their representative will relay the results to you with their   recommendations at their soonest availability.  Thank you,  RESTRICTIONS:  During your procedure today, you received medications for sedation.  These   medications may affect your judgment, balance and coordination.  Therefore,   for 24 hours, you have the following restrictions:   - DO NOT drive a car, operate machinery, make legal/financial decisions,   sign important papers or drink alcohol.    ACTIVITY:  Today: no heavy lifting, straining or running due to procedural   sedation/anesthesia.  The following day: return to full activity including work.  DIET:  Eat and drink normally unless instructed otherwise.     TREATMENT FOR COMMON SIDE EFFECTS:  - Mild abdominal pain, nausea, belching, bloating or excessive gas:  rest,   eat lightly and use a heating pad.  - Sore Throat: treat with throat lozenges and/or gargle with warm salt   water.  - Because air was used during the procedure, expelling large amounts of air   from your rectum or belching is normal.  - If a bowel prep was taken, you may not have a bowel movement for 1-3 days.    This is normal.  SYMPTOMS TO WATCH FOR AND REPORT TO YOUR PHYSICIAN:  1. Abdominal pain or bloating, other than gas cramps.  2. Chest pain.  3. Back pain.  4. Signs of infection such as: chills or fever occurring within 24 hours   after the procedure.  5. Rectal bleeding, which would show as bright red, maroon, or black stools.   (A tablespoon of blood from the rectum is not serious, especially  if   hemorrhoids are present.)  6. Vomiting.  7. Weakness or dizziness.  GO DIRECTLY TO THE NEAREST EMERGENCY ROOM IF YOU HAVE ANY OF THE FOLLOWING:      Difficulty breathing              Chills and/or fever over 101 F   Persistent vomiting and/or vomiting blood   Severe abdominal pain   Severe chest pain   Black, tarry stools   Bleeding- more than one tablespoon   Any other symptom or condition that you feel may need urgent attention  Your doctor recommends these additional instructions:  If any biopsies were taken, your doctors clinic will contact you in 1 to 2   weeks with any results.  - Return to my office as previously scheduled.  For questions, problems or results please call your physician - Jessica Hyde MD at Work:  [phone].  [facility] [place], EMERGENCY ROOM PHONE NUMBER: [phone]  IF A COMPLICATION OR EMERGENCY SITUATION ARISES AND YOU ARE UNABLE TO REACH   YOUR PHYSICIAN - GO DIRECTLY TO THE EMERGENCY ROOM.  Jessica Hyde MD  12/6/2022 11:02:29 AM  This report has been verified and signed electronically.  Dear patient,  As a result of recent federal legislation (The Federal Cures Act), you may   receive lab or pathology results from your procedure in your [facility]   account before your physician is able to contact you. Your physician or   their representative will relay the results to you with their   recommendations at their soonest availability.  Thank you,  PROVATION

## 2022-12-08 LAB
FINAL PATHOLOGIC DIAGNOSIS: NORMAL
GROSS: NORMAL
Lab: NORMAL

## 2022-12-14 NOTE — PROGRESS NOTES
Referring provider: Dr. Wili Harding  Reason for visit:  Behavioral and qualitative analysis of voice and resonance (CPT 61713)    The patient location is: [place]  The chief complaint leading to consultation is: dysphonia  Visit type: Virtual visit with synchronous audio and video  Face to Face time with patient: 50  60 minutes of total time spent on the encounter, which includes face to face time and non-face to face time preparing to see the patient (eg, review of tests), Obtaining and/or reviewing separately obtained history, Documenting clinical information in the electronic or other health record, Independently interpreting results (not separately reported) and communicating results to the patient/family/caregiver, or Care coordination (not separately reported).   Each patient to whom he or she provides medical services by telemedicine is:  (1) informed of the relationship between the physician and patient and the respective role of any other health care provider with respect to management of the patient; and (2) notified that he or she may decline to receive medical services by telemedicine and may withdraw from such care at any time.      Subjective / History    Abbey Vargas is a 72 y.o. female referred for voice evaluation (CPT 64711) by Dr. Harding.  She presents with c/o voice change she describes as gruff and tight. Clears throat all the time. No illness at onset. Uses voice socializing and at Bible study meetings, she has difficulty singing. Singing voice is worse than speaking voice, sometimes nothing comes out. She must repeat at times bc people can't hear. Her throat feels raw and sore. Symptoms have been present for years.  She notices the hoarseness all day, every day.  She reports she does not ever sound normal. She recalls doing some voice therapy in [facility] prior to Covid - she does not recall her voice improving but does remember it helped reduce her chronic throat clearing.       Esophagram May 2022:  Some poor clearing of the esophagus and severe gastroesophageal reflux; no fixed or dynamic UES obstruction   She is taking protonix following her esophagram - she reports it did not help her throat discomfort at all. Has not noticed a change in her symptoms since being off it.   Medication and problem lists were reviewed.     Swallow: difficulty related to throat soreness  Breathing: throat clearing    Smoking: non smoker   Caffeine: no  Reflux: yes  Water: 32 oz/day     Stroboscopy findings per Dr. Harding  11/28/22  All findings were normal except:  - mild bilateral vocal fold atrophy  - thick dry scattered endolarynegal mucus  - pliability intact and symmetric  - faint variable insufficiency, primarily with an anterior gap  - variable suprgalottic hyperfunction    Past Medical History:   Diagnosis Date    Arthritis     Carpal tunnel syndrome of right wrist 2000    Care for it here @ [facility] [place]    CKD (chronic kidney disease) stage 3, GFR 30-59 ml/min 01/30/2018    COVID-19 vaccine administered     vaccine and booster    Depression     Diverticulosis     Environmental allergies     Hemorrhoids     High cholesterol     Hypertension     Hypoactive thyroid     Muscle pain     Osteoporosis     Ptosis     Sleep apnea 2000    Care here at [facility] [place] on CPAP    Traumatic tear of supraspinatus tendon of right shoulder      Current Outpatient Medications on File Prior to Visit   Medication Sig Dispense Refill    alendronate (FOSAMAX) 70 MG tablet TAKE 1 TABLET BY MOUTH ON EMPTY STOMACH EVERY 7 DAYS W/ 8 OZ WATER.SIT UPRIGHT AT LEAST 30 MIN AFTER 12 tablet 3    aspirin (ECOTRIN) 81 MG EC tablet Take 81 mg by mouth once daily.      BREO ELLIPTA 200-25 mcg/dose DsDv diskus inhaler INHALE 1 PUFF INTO THE LUNGS ONCE DAILY. 60 each 5    calcium-vitamin D3 (OS- + D3) 500 mg(1,250mg) -200 unit per tablet       fish oil-omega-3 fatty acids 300-1,000 mg capsule Take 2 g by mouth once daily.      glucosamine sulfate  750 mg Tab Take 750 mg by mouth once daily.      levothyroxine (SYNTHROID) 100 MCG tablet TAKE 1 TABLET BY MOUTH EVERY DAY 90 tablet 3    LIDOcaine (LIDODERM) 5 % Place 1 patch onto the skin once daily. Remove & Discard patch within 12 hours or as directed by MD 7 patch 0    lisinopriL 10 MG tablet Take 1 tablet (10 mg total) by mouth every evening. 90 tablet 3    multivitamin (THERAGRAN) per tablet Take 1 tablet by mouth once daily.      pantoprazole (PROTONIX) 40 MG tablet Take 40 mg by mouth 2 (two) times daily.      pravastatin (PRAVACHOL) 80 MG tablet TAKE 1 TABLET BY MOUTH EVERY EVENING 90 tablet 3    TOBRADEX ST 0.3-0.05 % DrpS Place 1 drop into the right eye 2 (two) times a day.       Current Facility-Administered Medications on File Prior to Visit   Medication Dose Route Frequency Provider Last Rate Last Admin    0.9%  NaCl infusion   Intravenous Continuous Jeff Barajas  mL/hr at 06/14/22 0726 Restarted at 06/14/22 0734    ondansetron injection 4 mg  4 mg Intravenous Once PRN Hang Moreno MD        promethazine (PHENERGAN) 6.25 mg in dextrose 5 % 50 mL IVPB  6.25 mg Intravenous Q10 Min PRN Hang Moreno MD           Objective    Perceptual/behavioral assessment  -Quality: strained and santamaria/pulse mode phonation  -Volume: decreased projection  -Pitch: low F0  -Flexibility: diminished  -Habitual respiratory pattern: chest/clavicular      Voice Handicap Index-10   0 = Never 1 = Almost Never 2 = Sometimes 3 = Almost Always 4 = Always   My voice makes it difficult for people to hear me. 0 1 2 3 4   People have difficulty understanding me in a noisy room. 0 1 2 3 4   My voice difficulties restrict personal and social life. 0 1 2 3 4   I feel left out of conversations because of my voice. 0 1 2 3 4   My voice problem causes me to lose income. 0 1 2 3 4   I feel as though I have to strain to produce voice. 0 1 2 3 4   The clarity of my voice is unpredictable. 0 1 2 3 4   My voice problem upsets  me. 0 1 2 3 4   My voice makes me feel handicapped. 0 1 2 3 4   People ask, Whats wrong with your voice? 0 1 2 3 4  Score: 18    The Reflux Symptom Index (RSI)   0 = No problem to  5 = Severe problem   1.  Hoarseness or a problem with your voice 0 1 2 3 4 5   2.  Clearing your throat 0 1 2 3 4 5   3.  Excess throat mucous or postnasal drip 0 1 2 3 4 5   4.  Difficulty swallowing food, liquids, or pills 0 1 2 3 4 5   5.  Coughing after you ate or after lying down 0 1 2 3 4 5   6.  Breathing difficulties or choking episodes 0 1 2 3 4 5   7.  Troublesome or annoying cough 0 1 2 3 4 5   8.  Sensations of something sticking in your throat or a lump in your throat 0 1 2 3 4 5   9.  Heartburn, chest pain, indigestion, or stomach acid coming up 0 1 2 3 4 5   Score: 21  If you have an RSI score of 15 or greater, you have a 90% chance of having reflux, even if you have never had heartburn or indigestion. This is called silent reflux, and it's very common.  Lalito PC, Francois GN, and Davie JA.  Validity and reliability of the reflux symptom index (RSI).  Journal of Voice.   2002.  16(2): 274-277.     Clinical Evaluation/Treatment: This is a well developed, well nourished female who presents in no acute distress. The patient is fully oriented, affect is WNL. Oral mechanism examination reveals articulators to have range, speed and coordination of movement WFL for speech and swallow tasks. Oral cavity is dry. Voice today is low in pitch with glottal santamaria and some pitch instability. She has difficulty hearing pitch change and following model for various pitches.     Education / Stimulability Trials  Education provided on Dr. Harding's laryngeal videostroboscopy findings as they pertain to voice production, the patient's diagnosis, associated symptoms, rationale for voice therapy and plan of care for management of dysphonia. Discussed importance of vocal hygiene including: hydration, reducing throat clearing, coughing, other  phonotraumatic behaviors. Recommended steam and dry mouth lozenges as well as increased hydration. Patient was stimulable for improved voice using SOVT exercises with straw and cup phonation. Improvements in forward resonance with clearer vocal quality noted on sustained notes and phrases. Patient established optimal pitch at higher F0. She demonstrated carryover into phrases. During and following voice-therapy training, patient endorses improvements in vocal quality, flexibility and decreased extralaryngeal tension. Written instructions for Home Exercise program provided and sent to patient through chart electronically. Encouraged practicing exercises several times daily to solidify muscle memory patterns and reduce extralaryngeal tension during speech tasks.  She was amenable to all suggestions.     Functional goals  Length Status Goal   Long term Initiated  Patient will implement and adhere to vocal hygiene protocols on a daily basis, including the elimination of phonotraumatic behaviors.    Long term Initiated  Patient and clinician will facilitate changes in vocal function in order to restore functional use of voice for daily occupational, social, and emotional demands.    Short term Initiated  Patient will complete SOVT exercises and/or resonant-focused exercises 3-5x daily to strengthen and balance the intrinsic laryngeal musculature and maximize glottic closure without medial hyperfunction.   Short term Initiated  Patient will improve the quality, efficiency, and ease of phonation through resonant and/or airflow exercises from the syllable to conversation level with 80% accuracy.     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 Harding.  Prognosis for continued improvement is good.     Recommendations / POC    -Recommend 4-6 sessions of voice therapy over  10 weeks with a speech-language pathologist to optimize glottal postures for improved vocal function, vocal efficiency, and ease of phonation  -Initiate laryngeal hygiene and voice exercises as trained and discussed in session  -f/u scheduled for 2 weeks  -Contact clinician with any further questions

## 2022-12-15 ENCOUNTER — PATIENT MESSAGE (OUTPATIENT)
Dept: SPEECH THERAPY | Facility: HOSPITAL | Age: 72
End: 2022-12-15

## 2022-12-15 ENCOUNTER — CLINICAL SUPPORT (OUTPATIENT)
Dept: SPEECH THERAPY | Facility: HOSPITAL | Age: 72
End: 2022-12-15
Attending: OTOLARYNGOLOGY
Payer: MEDICARE

## 2022-12-15 DIAGNOSIS — R49.0 DYSPHONIA: Primary | ICD-10-CM

## 2022-12-15 DIAGNOSIS — R49.0 DYSPHONIA: ICD-10-CM

## 2022-12-15 DIAGNOSIS — J38.5 LARYNGEAL SPASM: ICD-10-CM

## 2022-12-15 PROCEDURE — 92524 BEHAVRAL QUALIT ANALYS VOICE: CPT | Mod: GN,95

## 2022-12-15 NOTE — PATIENT INSTRUCTIONS
"Lozenges:  Halls Breezers - sold with cough drops  Xylimelts, on toothpaste aisle, these are convenient for when you are talking and or sleeping - they stick to gumline and provide moisture - [facility]    You may consider getting a facial steamer, breathe in warm moist air  through mouth and nose to hydrate vocal folds. On amazon, I like the [employer] version that is under $25.    Alginate therapy such as Reflux Gourmet can be used following meals and at bedtime for reflux coverage. The Acid Watcher Diet by Dr Green, acidwatcherUrbanFarmers,  understanding of dietary and behavioral changes that can aid in reduction of LPRD, and to better understand cough, throat clear, burning in throat.    Voice Exercises: these are called Semi-Occluded Vocal Tract Exercises (SOVT)  Since you were asking how that worked the other day, when you had better and more relaxed voice, here is a synopsis: (SOVT) means there is some sort of narrowing somewhere along the vocal tract, which is anywhere from the vocal cords to the lips. SOVT exercises can improve the  conditions inside the throat as your vocal cords are vibrating. They do this by  neutralizing the pressure that is coming up from the lungs. When you have a  semi-occluded vocal tract, some of the airflow is blocked from exiting the mouth,  because your lips and/or teeth are closing off the airflow as you say OO, ZZ, VV,  etc. This airflow is reflected back towards the vocal cords and pushes back against the vocal cords from the top. This pushing from the top will balance out the pressure that is being pushed up from below from the lungs, and this allows your vocal cords to vibrate more efficiently (not too pressed or strained, not too breathy).        I would suggest spending 2-3 minutes every 2-3 hours doing exercise as we worked on. You can do these with a straw, with your hands or a cup at your mouth, with a straw in a cup of water, with the vowel "ooo"     Start with some steady " "sustained vowel "oooo" at a comfortable pitch, for example in the cup. You may use up to 2 or 3 note range. Hold the note steady as long as you can comfortably, repeat 5 x at each pitch  Write a list of phrases you say often, think about work, home, prayers, songs etc. Practice these inside the cup.      Begin noticing how the vibration feels at your lips or the cup, this is because you are placing your voice forward - instead of trapping and straining down in your throat.      A video to watch, google the following:  A simple vocal warm up for all voice users - wave in a cave!   You can follow along with this video using your hands as she does, or the cup or straw or simply the vowel "oo"     I hope this will make sense, especially after you watch the videos. Contact me with any questions!    "

## 2022-12-19 PROBLEM — M85.89 OSTEOPENIA OF MULTIPLE SITES: Status: ACTIVE | Noted: 2022-12-19

## 2022-12-19 PROBLEM — K21.9 GASTROESOPHAGEAL REFLUX DISEASE WITHOUT ESOPHAGITIS: Status: ACTIVE | Noted: 2022-12-19

## 2022-12-28 NOTE — PROGRESS NOTES
Referring provider: Dr. Wili Harding  Reason for visit:  Treatment of voice and resonance (CPT 90302)     The patient location is: [place]  The chief complaint leading to consultation is: dysphonia  Visit type: Virtual visit with synchronous audio and video  Face to Face time with patient: 50  60 minutes of total time spent on the encounter, which includes face to face time and non-face to face time preparing to see the patient (eg, review of tests), Obtaining and/or reviewing separately obtained history, Documenting clinical information in the electronic or other health record, Independently interpreting results (not separately reported) and communicating results to the patient/family/caregiver, or Care coordination (not separately reported).   Each patient to whom he or she provides medical services by telemedicine is:  (1) informed of the relationship between the physician and patient and the respective role of any other health care provider with respect to management of the patient; and (2) notified that he or she may decline to receive medical services by telemedicine and may withdraw from such care at any time.        Subjective / History    12/29/22: Ms Vargas presents for initial voice therapy session following 12/15/22 voice evaluation. She admits, has not initiated voice exercises as she has company in town for a family wedding. She has begun using Xylimelts, moisture lozenges and reflux gourmet. She states throat is feeling less sore. Facial steamer has been ordered and is on way. She uses daily Breo inhaler, does not notice it helps her in any way, does not feel she has asthma. She rinses mouth following use.     12/15/22: Abbey Vargas is a 72 y.o. female referred for voice evaluation (CPT 09352) by Dr. Harding.  She presents with c/o voice change she describes as gruff and tight. Clears throat all the time. No illness at onset. Uses voice socializing and at Bible study meetings, she has  difficulty singing. Singing voice is worse than speaking voice, sometimes nothing comes out. She must repeat at times bc people can't hear. Her throat feels raw and sore. Symptoms have been present for years.  She notices the hoarseness all day, every day.  She reports she does not ever sound normal. She recalls doing some voice therapy in [facility] prior to Covid - she does not recall her voice improving but does remember it helped reduce her chronic throat clearing.      Esophagram May 2022:  Some poor clearing of the esophagus and severe gastroesophageal reflux; no fixed or dynamic UES obstruction   She is taking protonix following her esophagram - she reports it did not help her throat discomfort at all. Has not noticed a change in her symptoms since being off it.   Medication and problem lists were reviewed.      Swallow: difficulty related to throat soreness  Breathing: throat clearing    Smoking: non smoker   Caffeine: no  Reflux: yes  Water: 32 oz/day      Stroboscopy findings per Dr. Harding  11/28/22  All findings were normal except:  - mild bilateral vocal fold atrophy  - thick dry scattered endolarynegal mucus  - pliability intact and symmetric  - faint variable insufficiency, primarily with an anterior gap  - variable suprgalottic hyperfunction     Past Medical History:   Diagnosis Date    Arthritis     Carpal tunnel syndrome of right wrist 2000    [place] [facility] [place]D (chronic kidney disease) stage 3, GFR 30-59 ml/min 01/30/2018    COVID-19 vaccine administered     vaccine and booster    Depression     Diverticulosis     Environmental allergies     Hemorrhoids     High cholesterol     Hypertension     Hypoactive thyroid     Muscle pain     Osteoporosis     Ptosis     Sleep apnea 2000    [place] [facility] [place]    Traumatic tear of supraspinatus tendon of right shoulder      Current Outpatient Medications on File Prior to Visit   Medication Sig Dispense Refill    aspirin (ECOTRIN) 81 MG EC  tablet Take 81 mg by mouth once daily.      BREO ELLIPTA 200-25 mcg/dose DsDv diskus inhaler INHALE 1 PUFF INTO THE LUNGS ONCE DAILY. 60 each 5    calcium-vitamin D3 (OS- + D3) 500 mg(1,250mg) -200 unit per tablet       glucosamine sulfate 750 mg Tab Take 750 mg by mouth once daily.      levothyroxine (SYNTHROID) 100 MCG tablet TAKE 1 TABLET BY MOUTH EVERY DAY 90 tablet 3    lisinopriL 10 MG tablet Take 1 tablet (10 mg total) by mouth every evening. 90 tablet 3    multivitamin (THERAGRAN) per tablet Take 1 tablet by mouth once daily.      pravastatin (PRAVACHOL) 80 MG tablet TAKE 1 TABLET BY MOUTH EVERY EVENING 90 tablet 3    TOBRADEX ST 0.3-0.05 % DrpS Place 1 drop into the right eye 2 (two) times a day.       Current Facility-Administered Medications on File Prior to Visit   Medication Dose Route Frequency Provider Last Rate Last Admin    0.9%  NaCl infusion   Intravenous Continuous Jeff Barajas  mL/hr at 06/14/22 0726 Restarted at 06/14/22 0734    ondansetron injection 4 mg  4 mg Intravenous Once PRN Hang Moreno MD        promethazine (PHENERGAN) 6.25 mg in dextrose 5 % 50 mL IVPB  6.25 mg Intravenous Q10 Min PRN Hang Moreno MD                 Objective    Perceptual/behavioral assessment  -Quality: strained and santamaria/pulse mode phonation   -Volume: decreased projection  -Pitch: low F0  -Flexibility: diminished  -Habitual respiratory pattern: chest/clavicular      Clinical Evaluation/Treatment: This is a well developed, well nourished female who presents in no acute distress. The patient is fully oriented, affect is WNL. Oral mechanism examination reveals articulators to have range, speed and coordination of movement WFL for speech and swallow tasks. Oral cavity is dry. Voice today is low in pitch with glottal santamaria and some pitch instability. She has difficulty hearing pitch change and following model for various pitches. Reviewed vocal hygiene including: hydration, reducing throat  clearing, coughing, other phonotraumatic behaviors. She will begin using steam and gargle upon acquiring facial steamer. She was given suggestion for elimination of throat clear to include sip, swallow, sniff. She did not demonstrate throat clear during this session. Patient was stimulable for improved voice using SOVT exercises with straw and cup bubble phonation. Improvements in forward resonance with clearer vocal quality noted on sustained notes and glides following model and cue and able to establish optimal pitch at higher F0. She demonstrated carryover into conversation following this warm up. She demonstrated fatigue with the exercise from respiratory standpoint, but did not demonstrate cough or throat clear during the exercises.  Written instructions for Home Exercise program provided and sent to patient through chart electronically. Encouraged practicing exercises several times daily to solidify muscle memory patterns and reduce extralaryngeal tension, improve breath support and reduce throat clear during speech tasks.  She was amenable to all suggestions.      Functional goals  Length Status Goal   Long term ongoing Patient will implement and adhere to vocal hygiene protocols on a daily basis, including the elimination of phonotraumatic behaviors such as throat clear with demonstration of fewer than 5 throat clears during session.    Long term ongoing Patient and clinician will facilitate changes in vocal function in order to restore functional use of voice for daily occupational, social, and emotional demands.    Short term ongoing Patient will complete SOVT exercises and/or resonant-focused exercises 3-5x daily to strengthen and balance the intrinsic laryngeal musculature and maximize glottic closure without medial hyperfunction.   Short term ongoing Patient will improve the quality, efficiency, and ease of phonation through resonant and/or airflow exercises from the syllable to conversation level with  80% accuracy.      Assessment     Patient presents with mild dysphonia and chronic throat clear secondary to mild vocal fold atrophy, suboptimal laryngeal hygiene, and muscle tension dysphonia, against a background of reflux as diagnosed by Dr. Harding.  Prognosis for continued improvement is good.      Recommendations / POC    -Continue with recommended 4-6 sessions of voice therapy over 10 weeks with a speech-language pathologist to optimize glottal postures for improved vocal function, vocal efficiency, and ease of phonation as well as throat clear elimination  -Continue laryngeal hygiene and voice exercises as trained and discussed in session  -f/u scheduled for 2 weeks  -Contact clinician with any further questions

## 2022-12-29 ENCOUNTER — CLINICAL SUPPORT (OUTPATIENT)
Dept: SPEECH THERAPY | Facility: HOSPITAL | Age: 72
End: 2022-12-29
Attending: OTOLARYNGOLOGY
Payer: MEDICARE

## 2022-12-29 DIAGNOSIS — R49.0 DYSPHONIA: ICD-10-CM

## 2022-12-29 DIAGNOSIS — J38.5 LARYNGEAL SPASM: ICD-10-CM

## 2022-12-29 PROCEDURE — 92507 TX SP LANG VOICE COMM INDIV: CPT | Mod: GN,95

## 2022-12-29 NOTE — PATIENT INSTRUCTIONS
Gargle:  1/2 tsp each salt, baking soda, clear corn syrup   6 oz warm water  Sip, gargle quietly, spit, repeat until gone  Don't eat or drink for 20-30 minutes    You may try steam 3-5 minutes followed by gargle. If that is too much at once, do gargle before bed and maybe when get up. I enjoy steamer while sitting watching a show or reading.     Try voice exercises as we practiced, but using a large cup or water bottle with straw in the water. You can adjust the depth of straw for increased or less resistance.   Practice steady notes  Pitch glides  Holding notes smooth and clear to work on endurance

## 2023-01-04 ENCOUNTER — OFFICE VISIT (OUTPATIENT)
Dept: GASTROENTEROLOGY | Facility: CLINIC | Age: 73
End: 2023-01-04
Payer: MEDICARE

## 2023-01-04 ENCOUNTER — PATIENT MESSAGE (OUTPATIENT)
Dept: GASTROENTEROLOGY | Facility: CLINIC | Age: 73
End: 2023-01-04

## 2023-01-04 DIAGNOSIS — R49.0 HOARSENESS: ICD-10-CM

## 2023-01-04 DIAGNOSIS — R68.2 DRY MOUTH: ICD-10-CM

## 2023-01-04 DIAGNOSIS — R05.9 COUGH, UNSPECIFIED TYPE: Primary | ICD-10-CM

## 2023-01-04 DIAGNOSIS — R13.10 DYSPHAGIA, UNSPECIFIED TYPE: ICD-10-CM

## 2023-01-04 PROCEDURE — 1157F PR ADVANCE CARE PLAN OR EQUIV PRESENT IN MEDICAL RECORD: ICD-10-PCS | Mod: CPTII,95,, | Performed by: INTERNAL MEDICINE

## 2023-01-04 PROCEDURE — 99214 OFFICE O/P EST MOD 30 MIN: CPT | Mod: 95,,, | Performed by: INTERNAL MEDICINE

## 2023-01-04 PROCEDURE — 99214 PR OFFICE/OUTPT VISIT, EST, LEVL IV, 30-39 MIN: ICD-10-PCS | Mod: 95,,, | Performed by: INTERNAL MEDICINE

## 2023-01-04 PROCEDURE — 1157F ADVNC CARE PLAN IN RCRD: CPT | Mod: CPTII,95,, | Performed by: INTERNAL MEDICINE

## 2023-01-04 NOTE — PROGRESS NOTES
The patient location is: Home  The chief complaint leading to consultation is: dysphagia, hoarseness, cough, throat clearing    Visit type: audiovisual    Face to Face time with patient: 25  30 minutes of total time spent on the encounter, which includes face to face time and non-face to face time preparing to see the patient (eg, review of tests), Obtaining and/or reviewing separately obtained history, Documenting clinical information in the electronic or other health record, Independently interpreting results (not separately reported) and communicating results to the patient/family/caregiver, or Care coordination (not separately reported).         Each patient to whom he or she provides medical services by telemedicine is:  (1) informed of the relationship between the physician and patient and the respective role of any other health care provider with respect to management of the patient; and (2) notified that he or she may decline to receive medical services by telemedicine and may withdraw from such care at any time.    Notes:        [facility] Consultation Note    Reason for Consult:  No chief complaint on file.          PCP:   Katharine Beltran       Referring MD:  Former GI: Jeff Barajas Md - pt does not want to return   [address] [place] [address]    Pulm: Fantasma Elizabeth DO   ENT: Dr. Sara Rockwell/Dr. Mehdi Gonzalez GI: Dr. Hang Garcia       HPI:  Abbey Vargas is a 72 y.o. female referred to motility clinic for second opinion regarding the following problems:    Dr. Arguello 07/26/2022: Consult [facility] for either pH study or impedance study and manometry.    GERD.    HH  Retrosternal pyrosis: none   Regurgitation: none   No sx prior to protonix     Hoarseness: daily  Cough: few per week and at night   Throat clearing: daily     No longer on pantoprazole, which never made a difference     Sore throat   Cough     Recent diagnosis of asthma,  post nasal drip   Breo inhaler without improvement     Seen ENT at OSH   Had blood allergy testing but does not know the results   Nasal steroid did not make a difference     Dry mouth   -treatment     Seen Dr. Harding:  Abbey Vargas is a 72 y.o. female with mild vocal fold atrophy, suboptimal laryngeal hygiene, and muscle tension dysphonia, against a background of reflux.  I educated the patient on the potential impact of reflux on laryngopharyngeal disorders. After she has completed her pH testing, she may benefit from a trial of alginate therapy.      SLP voice evaluation and subsequent therapy sessions are medically necessary for restoration of laryngeal function. We will arrange for this to occur with our team via telemedicine in the coming weeks.    Working w voice speech therapist     Dysphagia.    Problems with solids: rare   Problems with liquids: rare  Choking while eating : rare   Coughing wile eating: rare     Early satiety.  No     Diarrhea.  Resolved    Father w CRC 58.  Colonoscopy negative 7/2022  -Repeat in 7/2027 per primary GI    Weight loss   160lb from 159.6 lb    Denies nausea, vomiting, abdominal pain, bloating, constipation, BRBPR, melena    Total visit time was  30  minutes, more than 50% of which was spent in face-to-face counseling with patient regarding symptoms, diagnostic results, prognosis, risks and benefits of treatment options, instructions for management, importance of compliance with chosen treatment options and coping strategies.      Previous Studies:   Bravo 96h 12/06/2022: Normal pH study.  No significant acid reflux of PPI.  Total % 2.  [place] 7.  Poor correlation between cough reflux episodes.  EGD 11/29/2022: Normal esophagus (-).  Z-line regular 41 cm.  Benign appearing esophageal stenosis at GE junction.  2 cm hiatal hernia.  No puckering resistance or pop at GE junction.  Normal stomach (-).  Few gastric polyps (fundic gland polyps).  Normal duodenum (-).  Flip  with normal distensibility and diminished contractility.  Bravo placed.  Manometry 10/27/2022: Normal LES pressure with incomplete relaxation.  EG junction outflow obstruction.  Complete bolus clearance.  Abnormal multiple rapid swallow test.  No significant difference with provocative maneuvers.  No residual at the end of 200 cc bolus.  EGD 06/14/2022: Normal esophagus.  Atrophic gastritis.  Normal duodenum.  Colonoscopy 07/08/2022: Normal except for hemorrhoids.  Family history of colon cancer.  Repeat 5 years.  Esophagram 05/18/2022:  EZ gas crystals thick and thin barium were given to patient orally.  Tertiary contractions with poor clearing of the esophagus.  No focal stricture.  Swallowing the barium tablet the pill was stuck in the vallecula for sec for all seconds.  No focal stricture.  No hernia.  Severe GERD  CT soft tissue neck 07/10/2020: Prominence of wall of esophagus no specific otherwise negative CT of the neck with no focal masses.  EGD 02/04/2020:  Ring at GE junction.  Dilation 18 mm.  Normal mucosa in middle 3rd of the esophagus (negative).  Hiatal hernia.  Normal mucosa in the stomach.  Modified barium swallow 12/20/2019:  Oral stage:  Decreased tongue base retraction with residual in vallecula.  Pharyngeal stage: Mild decrease laryngeal excursion epiglottic inversion and cricopharyngeal.  Viewing of cervical spine indicated contained presence of bony protrusion at C4-C5 with prominent cricopharyngeal.  This structural issue cause restriction of bolus flow from pharynx to do upper esophageal sphincter and into cervical esophagus.  Mild vallecular, minding posterior pharyngeal wall and mild [place] form residue for all consistencies.  Trace and inconsistent laryngeal penetration full liquids rated level 3.  Aspiration not detected and controlled study but after testing patient provided with cup of water to Creole or cavity with resulted in choking coughing episode.  AP views with bilateral  symmetrical stasis in vallecula and piriform sinuses.  Screening of esophagus indicated slow clearing.  Mild-to-moderate oropharyngeal dysphagia due to structural restriction.  Etiology for decreased tongue base contraction and high all or pharyngeal anterior component unknown.  Prognosis guarded.  CT chest 09/30/2017:  Nondisplaced sternal fracture.  No acute intrathoracic finding.  CT abdomen 09/30/2017:  No evidence of acute intra-abdominal abnormality.  Colonic diverticulosis.    Relevant Surgical History:    NA    ROS:  ROS     Complete ROS negative except as above    Medical History:   Past Medical History:   Diagnosis Date    Arthritis     Carpal tunnel syndrome of right wrist 2000    Care for it here @ [facility]    CKD (chronic kidney disease) stage 3, GFR 30-59 ml/min 01/30/2018    COVID-19 vaccine administered     vaccine and booster    Depression     Diverticulosis     Environmental allergies     Hemorrhoids     High cholesterol     Hypertension     Hypoactive thyroid     Muscle pain     Osteoporosis     Ptosis     Sleep apnea 2000    Care here at [facility] on CPAP    Traumatic tear of supraspinatus tendon of right shoulder         Surgical History:   Past Surgical History:   Procedure Laterality Date    ARTHROSCOPIC DEBRIDEMENT OF ROTATOR CUFF Left 9/24/2020    Procedure: DEBRIDEMENT, ROTATOR CUFF, ARTHROSCOPIC;  Surgeon: Mg Orozco MD;  Location: [facility];  Service: Orthopedics;  Laterality: Left;    ARTHROSCOPIC TENOTOMY OF BICEPS TENDON Left 9/24/2020    Procedure: TENOTOMY, BICEPS, ARTHROSCOPIC;  Surgeon: Mg Orozco MD;  Location: [facility];  Service: Orthopedics;  Laterality: Left;    ARTHROSCOPY OF SHOULDER WITH DECOMPRESSION OF SUBACROMIAL SPACE Left 9/24/2020    Procedure: ARTHROSCOPY, SHOULDER, WITH SUBACROMIAL SPACE DECOMPRESSION;  Surgeon: Mg Orozco MD;  Location: [facility] OR;  Service: Orthopedics;  Laterality: Left;    ARTHROSCOPY OF SHOULDER WITH REMOVAL OF DISTAL CLAVICLE Left 9/24/2020     Procedure: ARTHROSCOPY, SHOULDER, WITH DISTAL CLAVICLE EXCISION;  Surgeon: Mg Orozco MD;  Location: [facility];  Service: Orthopedics;  Laterality: Left;    CARPAL TUNNEL RELEASE Right     CATARACT EXTRACTION Left 03/21/2017    COLONOSCOPY      COLONOSCOPY N/A 7/8/2020    Procedure: COLONOSCOPY;  Surgeon: Julia Meyer MD;  Location: [facility];  Service: Endoscopy;  Laterality: N/A;    ESOPHAGEAL MANOMETRY WITH MEASUREMENT OF IMPEDANCE N/A 10/19/2022    Procedure: MANOMETRY-ESOPHAGEAL-WITH IMPEDANCE;  Surgeon: Jessica Hyde MD;  [facility]  Service: Endoscopy;  Laterality: N/A;  fully vaccinated, instructions sent to [facility]    ESOPHAGOGASTRODUODENOSCOPY      ESOPHAGOGASTRODUODENOSCOPY N/A 6/14/2022    Procedure: EGD (ESOPHAGOGASTRODUODENOSCOPY);  Surgeon: Jeff Barajas MD;  Location: [facility];  Service: Endoscopy;  Laterality: N/A;    ESOPHAGOGASTRODUODENOSCOPY N/A 11/29/2022    Procedure: ESOPHAGOGASTRODUODENOSCOPY (EGD) with BRAVO;  Surgeon: Jessica Hyde MD;  Location: [facility] (4TH FLR);  Service: Endoscopy;  Laterality: N/A;  Endoflip  propofol only  fully vaccinated, instructions sent to [facility]    FRACTURE SURGERY Left     arm    GANGLION CYST EXCISION Right     wrist    HEMORRHOID SURGERY      KNEE ARTHROSCOPY Right 06/09/2017    KNEE SURGERY Right 02/07/2018    TKR    SHOULDER ARTHROSCOPY Left 9/24/2020    Procedure: ARTHROSCOPY, SHOULDER;  Surgeon: Mg Orozco MD;  Location: [facility];  Service: Orthopedics;  Laterality: Left;    STAPEDECTOMY Left 05/16/2017        Family History:   Family History   Problem Relation Age of Onset    Arthritis Mother     Scleroderma Mother     Cancer Mother     Hypertension Mother     Glaucoma Mother     Cataracts Mother     Breast cancer Mother     Cancer Father 54        colon    Colon cancer Father 58    Colon polyps Sister     Cancer Sister         breast    Colon polyps Sister     Breast cancer Maternal  Grandmother     Stomach cancer Neg Hx     Liver cancer Neg Hx     Esophageal cancer Neg Hx     Celiac disease Neg Hx     Cirrhosis Neg Hx         Social History:   Social History     Socioeconomic History    Marital status:     Years of education: 12   Tobacco Use    Smoking status: Never    Smokeless tobacco: Never   Substance and Sexual Activity    Alcohol use: Yes     Alcohol/week: 0.0 standard drinks     Comment: occasional- one drink twice a month    Drug use: No    Sexual activity: Not Currently     Partners: Male     Birth control/protection: Post-menopausal, Abstinence, See Surgical Hx   Other Topics Concern    Are you pregnant or think you may be? No    Breast-feeding No     Social Determinants of Health     Financial Resource Strain: Low Risk     Difficulty of Paying Living Expenses: Not hard at all   Food Insecurity: No Food Insecurity    Worried About Running Out of Food in the Last Year: Never true    Ran Out of Food in the Last Year: Never true   Transportation Needs: No Transportation Needs    Lack of Transportation (Medical): No    Lack of Transportation (Non-Medical): No   Physical Activity: Insufficiently Active    Days of Exercise per Week: 2 days    Minutes of Exercise per Session: 10 min   Stress: No Stress Concern Present    Feeling of Stress : Not at all   Social Connections: Unknown    Frequency of Communication with Friends and Family: More than three times a week    Frequency of Social Gatherings with Friends and Family: Three times a week    Active Member of Clubs or Organizations: Yes    Attends Club or Organization Meetings: 1 to 4 times per year    Marital Status:    Housing Stability: Low Risk     Unable to Pay for Housing in the Last Year: No    Number of Places Lived in the Last Year: 1    Unstable Housing in the Last Year: No        Review of patient's allergies indicates:  No Known Allergies    Current Outpatient Medications   Medication Sig Dispense Refill    aspirin  (ECOTRIN) 81 MG EC tablet Take 81 mg by mouth once daily.      BREO ELLIPTA 200-25 mcg/dose DsDv diskus inhaler INHALE 1 PUFF INTO THE LUNGS ONCE DAILY. 60 each 5    calcium-vitamin D3 (OS- + D3) 500 mg(1,250mg) -200 unit per tablet       glucosamine sulfate 750 mg Tab Take 750 mg by mouth once daily.      levothyroxine (SYNTHROID) 100 MCG tablet TAKE 1 TABLET BY MOUTH EVERY DAY 90 tablet 3    lisinopriL 10 MG tablet Take 1 tablet (10 mg total) by mouth every evening. 90 tablet 3    multivitamin (THERAGRAN) per tablet Take 1 tablet by mouth once daily.      pravastatin (PRAVACHOL) 80 MG tablet TAKE 1 TABLET BY MOUTH EVERY EVENING 90 tablet 3     No current facility-administered medications for this visit.     Facility-Administered Medications Ordered in Other Visits   Medication Dose Route Frequency Provider Last Rate Last Admin    0.9%  NaCl infusion   Intravenous Continuous Jeff Barajas  mL/hr at 06/14/22 0726 Restarted at 06/14/22 0734    ondansetron injection 4 mg  4 mg Intravenous Once PRN Hang Moreno MD        promethazine (PHENERGAN) 6.25 mg in dextrose 5 % 50 mL IVPB  6.25 mg Intravenous Q10 Min PRN Hang Moreno MD            Objective Findings:  Vital Signs:  There were no vitals taken for this visit.  There is no height or weight on file to calculate BMI.    Physical Exam:  Physical Exam:limited due to video visit  General appearance: alert, cooperative, no distress  HENT: Normocephalic, atraumatic, neck symmetrical, no nasal discharge  Eyes: conjunctivae/corneas clear,  EOM's intact  Extremities: visible extremities symmetric; no clubbing, cyanosis, or edema  Integument: visible Skin color, texture, turgor normal; no rashes; hair distrubution normal  Neurologic: Alert and oriented X 3,  Psychiatric: no pressured speech; normal affect; no evidence of impaired cognition    Labs:   Reviewed in Epic/record      Assessment and Plan:  Abbey Vargas is a 72 y.o. female with  referred to [facility] for 2nd opinion regarding following problems:    GERD.    Denies typical sx.  Atypical Sx  HH  Severe GERD on esophagogram   No improvement with protonix 40mg BID   BRAVO 96h negative/poor correlation w cough     Hoarseness. Throat clearing  Sore throat   Cough.   Dry mouth   MBS 2019 w mild-to-moderate oropharyngeal dysphagia  Mild vocal fold atrophy, suboptimal laryngeal hygiene, and muscle tension dysphonia  -Cont speech therapy  -Treat dry mouth   -Fu Dr. Harding     Cough. Asthma.   On Breo.   Followed by pulmonologist.     Rare dysphagia to solids  EGD w dilation of ring at GEJ w 18mm.    Esophagogram w tertiary contractions and poor clearing of esophagus.  Ct neck w prominence of wall of esophagus  Manometry GEJOO w complete bolus clearance and no residual w 200 cc bolus   EGD negative   Flip with normal distensibility and diminished contractility.      Mild-to-moderate oropharyngeal dysphagia due to structural restriction (bony protrusion at C4-C5 with prominent cricopharyngeal mm)  Barium tablet stuck in the vallecula on esophagogram 2022  No ho CVA.  Denies neurological deficits  -Ref to Dr. Harding/Speech pathology     Diarrhea now resolved   -Def to ref GI     Weight loss. Stable. Recent EGD/colon negative  -Shakes prn   -FU w PCP for further grant     Follow up with Dr. Garcia for management of chronic GI problems.  Return to motility clinic as needed. Plan discussed with pt, pt agrees.      Follow up if symptoms worsen or fail to improve.    1. Cough, unspecified type    2. Hoarseness    3. Dry mouth    4. Dysphagia, unspecified type            Order summary:         Discussed with PT that I act as a consult service and do not accept patients to be their primary GI provider. Discussed that the goal of our visits is to address relevant motility problems while deferring other GI problems as well as screening and surveillance to his/her primary GI provider.    Discussed that he/she needs to continue to follow with his local primary GI provider.  Discussed that we will complete his/her workup, clarify diagnosis and attempt to optimize his/her symptoms with intention of him/her returning to referring GI provider for long term GI care.   Pt verbalized understanding.        Thank you so much for allowing me to participate in the care of Abbey Hyde MD      This note was created using voice recognition software, and may contain some unrecognized transcriptional errors.

## 2023-01-11 NOTE — PROGRESS NOTES
Referring provider: Dr. Wili Harding  Reason for visit:  Treatment of voice and resonance (CPT 58638)     The patient location is: [place]  The chief complaint leading to consultation is: dysphonia  Visit type: Virtual visit with synchronous audio and video  Face to Face time with patient: 50  60 minutes of total time spent on the encounter, which includes face to face time and non-face to face time preparing to see the patient (eg, review of tests), Obtaining and/or reviewing separately obtained history, Documenting clinical information in the electronic or other health record, Independently interpreting results (not separately reported) and communicating results to the patient/family/caregiver, or Care coordination (not separately reported).   Each patient to whom he or she provides medical services by telemedicine is:  (1) informed of the relationship between the physician and patient and the respective role of any other health care provider with respect to management of the patient; and (2) notified that he or she may decline to receive medical services by telemedicine and may withdraw from such care at any time.        Subjective / History    1/12/23: Ms Vargas presents for f/u voice therapy. She has seen Dr Hyde since last visit and d/c from her services, she does not need to return to taking Protonix, based on Dr Hyde's findings. She has persistent chronic sore throat, worsened by talking, but present at all times. She uses her voice minimally through the day. She is singing twice per week, has not noticed if singing or speaking voice have improved with vocal exercise. She sometimes uses Menthol lozenges and a Vicks nasal inhaler. She just received her facial steamer.    12/29/22: Ms Vargas presents for initial voice therapy session following 12/15/22 voice evaluation. She admits, has not initiated voice exercises as she has company in town for a family wedding. She has begun using Xylimelts, moisture  lozenges and reflux gourmet. She states throat is feeling less sore. Facial steamer has been ordered and is on way. She uses daily Breo inhaler, does not notice it helps her in any way, does not feel she has asthma. She rinses mouth following use.      12/15/22: Abbey Vargas is a 72 y.o. female referred for voice evaluation (CPT 33235) by Dr. Harding.  She presents with c/o voice change she describes as gruff and tight. Clears throat all the time. No illness at onset. Uses voice socializing and at [employer] study meetings, she has difficulty singing. Singing voice is worse than speaking voice, sometimes nothing comes out. She must repeat at times bc people can't hear. Her throat feels raw and sore. Symptoms have been present for years.  She notices the hoarseness all day, every day.  She reports she does not ever sound normal. She recalls doing some voice therapy in [facility] prior to Covid - she does not recall her voice improving but does remember it helped reduce her chronic throat clearing.      Esophagram May 2022:  Some poor clearing of the esophagus and severe gastroesophageal reflux; no fixed or dynamic UES obstruction   She is taking protonix following her esophagram - she reports it did not help her throat discomfort at all. Has not noticed a change in her symptoms since being off it.   Medication and problem lists were reviewed.      Swallow: difficulty related to throat soreness  Breathing: throat clearing    Smoking: non smoker   Caffeine: no  Reflux: yes  Water: 32 oz/day      Stroboscopy findings per Dr. Harding  11/28/22  All findings were normal except:  - mild bilateral vocal fold atrophy  - thick dry scattered endolarynegal mucus  - pliability intact and symmetric  - faint variable insufficiency, primarily with an anterior gap  - variable suprgalottic hyperfunction     Past Medical History:   Diagnosis Date    Arthritis     Carpal tunnel syndrome of right wrist 2000    [place] [facility] [place]     CKD (chronic kidney disease) stage 3, GFR 30-59 ml/min 01/30/2018    COVID-19 vaccine administered     vaccine and booster    Depression     Diverticulosis     Environmental allergies     Hemorrhoids     High cholesterol     Hypertension     Hypoactive thyroid     Muscle pain     Osteoporosis     Ptosis     Sleep apnea 2000    Care here at [facility] [place] on CPAP    Traumatic tear of supraspinatus tendon of right shoulder      Current Outpatient Medications on File Prior to Visit   Medication Sig Dispense Refill    aspirin (ECOTRIN) 81 MG EC tablet Take 81 mg by mouth once daily.      BREO ELLIPTA 200-25 mcg/dose DsDv diskus inhaler INHALE 1 PUFF INTO THE LUNGS ONCE DAILY. 60 each 5    calcium-vitamin D3 (OS- + D3) 500 mg(1,250mg) -200 unit per tablet       glucosamine sulfate 750 mg Tab Take 750 mg by mouth once daily.      levothyroxine (SYNTHROID) 100 MCG tablet TAKE 1 TABLET BY MOUTH EVERY DAY 90 tablet 3    lisinopriL 10 MG tablet Take 1 tablet (10 mg total) by mouth every evening. 90 tablet 3    multivitamin (THERAGRAN) per tablet Take 1 tablet by mouth once daily.      pravastatin (PRAVACHOL) 80 MG tablet TAKE 1 TABLET BY MOUTH EVERY EVENING 90 tablet 3     Current Facility-Administered Medications on File Prior to Visit   Medication Dose Route Frequency Provider Last Rate Last Admin    0.9%  NaCl infusion   Intravenous Continuous Jeff Barajas  mL/hr at 06/14/22 0726 Restarted at 06/14/22 0734    ondansetron injection 4 mg  4 mg Intravenous Once PRN Hang Moreno MD        promethazine (PHENERGAN) 6.25 mg in dextrose 5 % 50 mL IVPB  6.25 mg Intravenous Q10 Min PRN Hang Moreno MD            Objective    Perceptual/behavioral assessment  -Quality: strained and santamaria/pulse mode phonation   -Volume: decreased projection  -Pitch: low F0  -Flexibility: diminished  -Habitual respiratory pattern: chest/clavicular      Clinical Evaluation/Treatment: This is a well developed, well nourished female who  presents in no acute distress. The patient is fully oriented, affect is WNL. Voice today with glottal santamaria. She has difficulty hearing pitch change, therefore, has difficulty with voice exercises independently and during therapy. Reviewed vocal hygiene including: hydration, reducing throat clearing, coughing, other phonotraumatic behaviors. Discouraged use of any menthol products. She will begin using steam and gargle. Reviewed suggestions for elimination of throat clear to include sip, swallow, sniff. She coughed x 1 during this session, voice declined prior to cough and improved following cough suggesting mucus as found on videostroboscopy. Patient was stimulable for improved voice using SOVT exercises with straw and cup bubble phonation, she was unsure of how to practice in home program and retrained with explanation of the method. Improvements in forward resonance with clearer vocal quality noted on sustained notes and glides following model and cue and able to establish optimal pitch at higher F0. She demonstrated carryover into conversation following this warm up. She demonstrated fatigue with the exercise from respiratory standpoint, but did not demonstrate cough or throat clear during the exercises.  Written instructions for Home Exercise program provided and sent to patient through chart electronically. Encouraged practicing exercises several times daily to solidify muscle memory patterns and reduce extralaryngeal tension, improve breath support and reduce throat clear during speech tasks.  She was amenable to all suggestions.      Functional goals  Length Status Goal   Long term ongoing Patient will implement and adhere to vocal hygiene protocols on a daily basis, including the elimination of phonotraumatic behaviors such as throat clear with demonstration of fewer than 5 throat clears during session.    Long term ongoing Patient and clinician will facilitate changes in vocal function in order to restore  functional use of voice for daily occupational, social, and emotional demands.    Short term ongoing Patient will complete SOVT exercises and/or resonant-focused exercises 3-5x daily to strengthen and balance the intrinsic laryngeal musculature and maximize glottic closure without medial hyperfunction.   Short term ongoing Patient will improve the quality, efficiency, and ease of phonation through resonant and/or airflow exercises from the syllable to conversation level with 80% accuracy.      Assessment     Patient presents with mild dysphonia and chronic throat clear secondary to mild vocal fold atrophy, suboptimal laryngeal hygiene, and muscle tension dysphonia, against a background of reflux as diagnosed by Dr. Harding.  Prognosis for continued improvement is good.      Recommendations / POC    -Continue with recommended 4-6 sessions of voice therapy over 10 weeks with a speech-language pathologist to optimize glottal postures for improved vocal function, vocal efficiency, and ease of phonation as well as throat clear elimination  -Continue laryngeal hygiene and voice exercises as trained and discussed in session  -f/u scheduled for 3 weeks  -Contact clinician with any further questions

## 2023-01-12 ENCOUNTER — CLINICAL SUPPORT (OUTPATIENT)
Dept: SPEECH THERAPY | Facility: HOSPITAL | Age: 73
End: 2023-01-12
Payer: MEDICARE

## 2023-01-12 DIAGNOSIS — R49.0 DYSPHONIA: Primary | ICD-10-CM

## 2023-01-12 DIAGNOSIS — J38.5 LARYNGEAL SPASM: ICD-10-CM

## 2023-01-12 PROCEDURE — 92507 TX SP LANG VOICE COMM INDIV: CPT | Mod: GN,95

## 2023-01-31 DIAGNOSIS — J38.5 LARYNGEAL SPASM: ICD-10-CM

## 2023-01-31 DIAGNOSIS — R49.0 DYSPHONIA: Primary | ICD-10-CM

## 2023-02-01 NOTE — PROGRESS NOTES
Referring provider: Dr. Wili Harding  Reason for visit:  Treatment of voice and resonance (CPT 36593)     The patient location is: [place]  The chief complaint leading to consultation is: dysphonia  Visit type: Virtual visit with synchronous audio and video  Face to Face time with patient: 50  60 minutes of total time spent on the encounter, which includes face to face time and non-face to face time preparing to see the patient (eg, review of tests), Obtaining and/or reviewing separately obtained history, Documenting clinical information in the electronic or other health record, Independently interpreting results (not separately reported) and communicating results to the patient/family/caregiver, or Care coordination (not separately reported).   Each patient to whom he or she provides medical services by telemedicine is:  (1) informed of the relationship between the physician and patient and the respective role of any other health care provider with respect to management of the patient; and (2) notified that he or she may decline to receive medical services by telemedicine and may withdraw from such care at any time.        Subjective / History    2/2/23: Abbey Vargas presents with continued c/o throat clearing, laryngeal mucus, cough and hoarse voice. She has been using steam, xylimelts and increasing hydration with minimal improvement. She is not regularly practicing voice exercises. She began taking Mucinex, not noticing improvement. Spoke for 5 minutes at [facility] recently, was told her voice was very soft on Zoom. She takes Lisinopril, has not discussed this with PCP regarding cough. She would like to see an allergist, has a trusted one whom cared for her daughter.    1/12/23: Ms Vargas presents for f/u voice therapy. She has seen Dr Hyde since last visit and d/c from her services, she does not need to return to taking Protonix, based on Dr Hyde's findings. She has persistent chronic sore throat,  worsened by talking, but present at all times. She uses her voice minimally through the day. She is singing twice per week, has not noticed if singing or speaking voice have improved with vocal exercise. She sometimes uses Menthol lozenges and a Vicks nasal inhaler. She just received her facial steamer.     12/29/22: Ms Vargas presents for initial voice therapy session following 12/15/22 voice evaluation. She admits, has not initiated voice exercises as she has company in town for a family wedding. She has begun using Xylimelts, moisture lozenges and reflux gourmet. She states throat is feeling less sore. Facial steamer has been ordered and is on way. She uses daily Breo inhaler, does not notice it helps her in any way, does not feel she has asthma. She rinses mouth following use.      12/15/22: Abbey Vargas is a 72 y.o. female referred for voice evaluation (CPT 09114) by Dr. Harding.  She presents with c/o voice change she describes as gruff and tight. Clears throat all the time. No illness at onset. Uses voice socializing and at [employer] study meetings, she has difficulty singing. Singing voice is worse than speaking voice, sometimes nothing comes out. She must repeat at times bc people can't hear. Her throat feels raw and sore. Symptoms have been present for years.  She notices the hoarseness all day, every day.  She reports she does not ever sound normal. She recalls doing some voice therapy in [facility] prior to Covid - she does not recall her voice improving but does remember it helped reduce her chronic throat clearing.      Esophagram May 2022:  Some poor clearing of the esophagus and severe gastroesophageal reflux; no fixed or dynamic UES obstruction   She is taking protonix following her esophagram - she reports it did not help her throat discomfort at all. Has not noticed a change in her symptoms since being off it.   Medication and problem lists were reviewed.      Swallow: difficulty related to  throat soreness  Breathing: throat clearing    Smoking: non smoker   Caffeine: no  Reflux: yes  Water: 32 oz/day      Stroboscopy findings per Dr. Harding  11/28/22  All findings were normal except:  - mild bilateral vocal fold atrophy  - thick dry scattered endolarynegal mucus  - pliability intact and symmetric  - faint variable insufficiency, primarily with an anterior gap  - variable suprgalottic hyperfunction     Past Medical History:   Diagnosis Date    Arthritis     Carpal tunnel syndrome of right wrist 2000    Care for it here @ [facility] [place]    CKD (chronic kidney disease) stage 3, GFR 30-59 ml/min 01/30/2018    COVID-19 vaccine administered     vaccine and booster    Depression     Diverticulosis     Environmental allergies     Hemorrhoids     High cholesterol     Hypertension     Hypoactive thyroid     Muscle pain     Osteoporosis     Ptosis     Sleep apnea 2000    Care here at [facility] [place] on CPAP    Traumatic tear of supraspinatus tendon of right shoulder      Current Outpatient Medications on File Prior to Visit   Medication Sig Dispense Refill    aspirin (ECOTRIN) 81 MG EC tablet Take 81 mg by mouth once daily.      BREO ELLIPTA 200-25 mcg/dose DsDv diskus inhaler INHALE 1 PUFF INTO THE LUNGS ONCE DAILY. 60 each 5    calcium-vitamin D3 (OS- + D3) 500 mg(1,250mg) -200 unit per tablet       clotrimazole-betamethasone 1-0.05% (LOTRISONE) cream Apply topically 2 (two) times daily. 15 g 2    glucosamine sulfate 750 mg Tab Take 750 mg by mouth once daily.      levothyroxine (SYNTHROID) 100 MCG tablet TAKE 1 TABLET BY MOUTH EVERY DAY 90 tablet 3    lisinopriL 10 MG tablet Take 1 tablet (10 mg total) by mouth every evening. 90 tablet 3    multivitamin (THERAGRAN) per tablet Take 1 tablet by mouth once daily.      pravastatin (PRAVACHOL) 80 MG tablet TAKE 1 TABLET BY MOUTH EVERY EVENING 90 tablet 3     Current Facility-Administered Medications on File Prior to Visit   Medication Dose Route Frequency Provider Last Rate Last  "Admin    0.9%  NaCl infusion   Intravenous Continuous Jeff Barajas  mL/hr at 06/14/22 0726 Restarted at 06/14/22 0734    ondansetron injection 4 mg  4 mg Intravenous Once PRN Hang Moreno MD        promethazine (PHENERGAN) 6.25 mg in dextrose 5 % 50 mL IVPB  6.25 mg Intravenous Q10 Min PRN Hang Moreno MD              Objective    Perceptual/behavioral assessment  -Quality: strained and santamaria/pulse mode phonation   -Volume: decreased projection  -Pitch: low F0  -Flexibility: diminished  -Habitual respiratory pattern: chest/clavicular      Clinical Evaluation/Treatment: This is a well developed, well nourished female who presents in no acute distress. The patient is fully oriented, affect is WNL. Voice today with glottal santamaria, essentially unchanged. She continues with somewhat frequent throat clear, it does sound to be related to mucus, as voice becomes clearer briefly following the throat clear. She has difficulty hearing pitch change, therefore, continued difficulty with voice exercises independently and during therapy. Reviewed vocal hygiene including: hydration, reducing throat clearing, coughing, other phonotraumatic behaviors. She will continue using steam and gargle and consider use of nasal saline. Reviewed suggestions for elimination of throat clear to include sip, swallow, sniff.  Patient was stimulable for improved voice using SOVT exercises with "oo" vowel, she was unsure of how to practice in home program and retrained with explanation of the method. Improvements in forward resonance with clearer vocal quality noted on sustained notes and glides following model and cue and able to establish optimal pitch at higher F0. She demonstrated carryover into conversation following this warm up. Encouraged she practice warm up prior to [employer] meetings. S  She was amenable to all suggestions.      Functional goals  Length Status Goal   Long term ongoing Patient will implement and adhere to vocal " hygiene protocols on a daily basis, including the elimination of phonotraumatic behaviors such as throat clear with demonstration of fewer than 5 throat clears during session.    Long term ongoing Patient and clinician will facilitate changes in vocal function in order to restore functional use of voice for daily occupational, social, and emotional demands.    Short term ongoing Patient will complete SOVT exercises and/or resonant-focused exercises 3-5x daily to strengthen and balance the intrinsic laryngeal musculature and maximize glottic closure without medial hyperfunction.   Short term ongoing Patient will improve the quality, efficiency, and ease of phonation through resonant and/or airflow exercises from the syllable to conversation level with 80% accuracy.      Assessment     Patient presents with mild dysphonia and chronic throat clear secondary to mild vocal fold atrophy, suboptimal laryngeal hygiene, and muscle tension dysphonia, against a background of reflux as diagnosed by Dr. Harding.  Prognosis for continued improvement is guarded, as she has made little progress to date.      Recommendations / POC    -Continue laryngeal hygiene and voice exercises as trained and discussed in session  -pt would like to pursue allergy consult, she will call and make appt  -encouraged her to discuss Lisinopril with PCP and allergist, regarding possible contribution to cough  -Contact clinician with any further questions and recs/findings from those appts

## 2023-02-02 ENCOUNTER — PATIENT MESSAGE (OUTPATIENT)
Dept: SPEECH THERAPY | Facility: HOSPITAL | Age: 73
End: 2023-02-02

## 2023-02-02 ENCOUNTER — CLINICAL SUPPORT (OUTPATIENT)
Dept: SPEECH THERAPY | Facility: HOSPITAL | Age: 73
End: 2023-02-02
Attending: OTOLARYNGOLOGY
Payer: MEDICARE

## 2023-02-02 DIAGNOSIS — R49.0 DYSPHONIA: ICD-10-CM

## 2023-02-02 DIAGNOSIS — J38.5 LARYNGEAL SPASM: ICD-10-CM

## 2023-02-02 PROCEDURE — 92507 TX SP LANG VOICE COMM INDIV: CPT | Mod: GN,95

## 2023-05-09 ENCOUNTER — PATIENT OUTREACH (OUTPATIENT)
Dept: ADMINISTRATIVE | Facility: HOSPITAL | Age: 73
End: 2023-05-09
Payer: MEDICARE

## 2023-05-09 DIAGNOSIS — Z12.31 ENCOUNTER FOR SCREENING MAMMOGRAM FOR BREAST CANCER: Primary | ICD-10-CM

## 2023-05-24 ENCOUNTER — PATIENT OUTREACH (OUTPATIENT)
Dept: ADMINISTRATIVE | Facility: HOSPITAL | Age: 73
End: 2023-05-24
Payer: MEDICARE

## 2023-05-24 DIAGNOSIS — Z12.31 ENCOUNTER FOR SCREENING MAMMOGRAM FOR BREAST CANCER: Primary | ICD-10-CM

## 2023-08-10 ENCOUNTER — OFFICE VISIT (OUTPATIENT)
Dept: URGENT CARE | Facility: CLINIC | Age: 73
End: 2023-08-10
Payer: MEDICARE

## 2023-08-10 VITALS
HEART RATE: 49 BPM | TEMPERATURE: 98 F | BODY MASS INDEX: 28.88 KG/M2 | RESPIRATION RATE: 17 BRPM | WEIGHT: 163 LBS | SYSTOLIC BLOOD PRESSURE: 104 MMHG | DIASTOLIC BLOOD PRESSURE: 56 MMHG | HEIGHT: 63 IN | OXYGEN SATURATION: 97 %

## 2023-08-10 DIAGNOSIS — L03.313 CELLULITIS OF CHEST WALL: Primary | ICD-10-CM

## 2023-08-10 PROCEDURE — 99214 PR OFFICE/OUTPT VISIT, EST, LEVL IV, 30-39 MIN: ICD-10-PCS | Mod: S$GLB,,, | Performed by: FAMILY MEDICINE

## 2023-08-10 PROCEDURE — 99214 OFFICE O/P EST MOD 30 MIN: CPT | Mod: S$GLB,,, | Performed by: FAMILY MEDICINE

## 2023-08-10 RX ORDER — NAPROXEN 375 MG/1
375 TABLET ORAL 2 TIMES DAILY
Qty: 20 TABLET | Refills: 0 | Status: SHIPPED | OUTPATIENT
Start: 2023-08-10 | End: 2023-12-19

## 2023-08-10 RX ORDER — SULFAMETHOXAZOLE AND TRIMETHOPRIM 800; 160 MG/1; MG/1
1 TABLET ORAL 2 TIMES DAILY
Qty: 20 TABLET | Refills: 0 | Status: SHIPPED | OUTPATIENT
Start: 2023-08-10 | End: 2023-12-19

## 2023-08-10 NOTE — PROGRESS NOTES
"Subjective:      Patient ID: Abbey Vargas is a 72 y.o. female.    Vitals:  height is 5' 3" (1.6 m) and weight is 73.9 kg (163 lb). Her oral temperature is 98.3 °F (36.8 °C). Her blood pressure is 104/56 (abnormal) and her pulse is 49 (abnormal). Her respiration is 17 and oxygen saturation is 97%.     Chief Complaint: Breast Problem    Pt states she started feeling something under her left breast and had her daughter look at it and states it was read.    Other  This is a new problem. Episode onset: 3 days. The problem occurs constantly. The problem has been unchanged. Pertinent negatives include no congestion, coughing, fever, headaches, nausea, neck pain, numbness or vomiting. Associated symptoms comments: Redness under left breast. She has tried nothing for the symptoms.       Constitution: Negative. Negative for fever.   HENT: Negative.  Negative for congestion.    Neck: Negative for neck pain.   Cardiovascular: Negative.    Eyes: Negative.    Respiratory: Negative.  Negative for cough.    Gastrointestinal: Negative.  Negative for nausea and vomiting.   Endocrine: negative.   Genitourinary: Negative.    Musculoskeletal: Negative.    Skin: Negative.  Positive for erythema.   Allergic/Immunologic: Negative.    Neurological: Negative.  Negative for headaches and numbness.   Hematologic/Lymphatic: Negative.    Psychiatric/Behavioral: Negative.        Objective:     Physical Exam   Constitutional: She is oriented to person, place, and time. She appears well-developed.   HENT:   Head: Normocephalic and atraumatic. Head is without abrasion, without contusion and without laceration.   Ears:   Right Ear: External ear normal.   Left Ear: External ear normal.   Nose: Nose normal.   Mouth/Throat: Oropharynx is clear and moist and mucous membranes are normal.   Eyes: Conjunctivae, EOM and lids are normal. Pupils are equal, round, and reactive to light.   Neck: Trachea normal and phonation normal. Neck supple. "   Cardiovascular: Normal rate, regular rhythm and normal heart sounds.   Pulmonary/Chest: Effort normal and breath sounds normal. No stridor. No respiratory distress.   Musculoskeletal: Normal range of motion.         General: Normal range of motion.   Neurological: She is alert and oriented to person, place, and time.   Skin: Skin is warm, dry, intact and no rash. Capillary refill takes less than 2 seconds. erythema and lesion No abrasion, No burn, No bruising and No ecchymosis        Psychiatric: Her speech is normal and behavior is normal. Judgment and thought content normal.   Nursing note and vitals reviewed.      Assessment:     1. Cellulitis of chest wall        Plan:       Cellulitis of chest wall  -     sulfamethoxazole-trimethoprim 800-160mg (BACTRIM DS) 800-160 mg Tab; Take 1 tablet by mouth 2 (two) times daily.  Dispense: 20 tablet; Refill: 0  -     naproxen (NAPROSYN) 375 MG tablet; Take 1 tablet (375 mg total) by mouth 2 (two) times daily.  Dispense: 20 tablet; Refill: 0      Please drink plenty of fluids.  Please get plenty of rest.  Please return here or go to the Emergency Department for any concerns or worsening of condition.  If you were prescribed antibiotics, please take them to completion.  If you were prescribed a narcotic medication, do not drive or operate heavy equipment or machinery while taking these medications.  Please return here in 1-3 days for a recheck of your wound.  If not allergic, please take over the counter Tylenol (Acetaminophen) and/or Motrin (Ibuprofen) as directed for control of pain and/or fever.  Soak in a warm tub of water (as warm as you can stand without hurting yourself) or put a warm wet compress on the area as often as you resonably can (more is better.)    If you  smoke, please stop smoking.    Please follow up with your primary care doctor or specialist as needed.      Katharine Beltran NP  [phone]    You must understand that you have received treatment at an  Urgent Care facility only, and that you may be  released before all of your medical problems are known or treated. Urgent Care facilities are not equipped to  handle life threatening emergencies. It is recommended that you seek care at an Emergency Department for  further evaluation of worsening or concerning symptoms, or possibly life threatening conditions as  discussed.

## 2023-08-10 NOTE — PATIENT INSTRUCTIONS
Please drink plenty of fluids.  Please get plenty of rest.  Please return here or go to the Emergency Department for any concerns or worsening of condition.  If you were prescribed antibiotics, please take them to completion.  If you were prescribed a narcotic medication, do not drive or operate heavy equipment or machinery while taking these medications.  Please return here in 1-3 days for a recheck of your wound.  If not allergic, please take over the counter Tylenol (Acetaminophen) and/or Motrin (Ibuprofen) as directed for control of pain and/or fever.  Soak in a warm tub of water (as warm as you can stand without hurting yourself) or put a warm wet compress on the area as often as you resonably can (more is better.)    If you  smoke, please stop smoking.    Please follow up with your primary care doctor or specialist as needed.      Katharine Beltran NP  [phone]    You must understand that you have received treatment at an Urgent Care facility only, and that you may be  released before all of your medical problems are known or treated. Urgent Care facilities are not equipped to  handle life threatening emergencies. It is recommended that you seek care at an Emergency Department for  further evaluation of worsening or concerning symptoms, or possibly life threatening conditions as  discussed.    Discharge Instructions for Cellulitis  You have been diagnosed with cellulitis. This is an infection in the deepest layer of the skin. In some cases, the infection also affects the muscle. Cellulitis is caused by bacteria. The bacteria can enter the body through broken skin. This can happen with a cut, scratch, animal bite, or an insect bite that has been scratched. You may have been treated in the hospital with antibiotics and fluids. You will likely be given a prescription for antibiotics to take at home. This sheet will help you take care of yourself at home.  Home care  When you are home:  Take the prescribed  antibiotic medicine you are given as directed until it is gone. Take it even if you feel better. It treats the infection and stops it from returning. Not taking all the medicine can make future infections hard to treat.  Keep the infected area clean.  When possible, raise the infected area above the level of your heart. This helps keep swelling down.  Talk with your healthcare provider if you are in pain. Ask what kind of over-the-counter medicine you can take for pain.  Apply clean bandages as advised.  Take your temperature once a day for a week.  Wash your hands often to prevent spreading the infection.  In the future, wash your hands before and after you touch cuts, scratches, or bandages. This will help prevent infection.   When to call your healthcare provider  Call your healthcare provider immediately if you have any of the following:  Difficulty or pain when moving the joints above or below the infected area  Discharge or pus draining from the area  Fever of 100.4°F (38°C) or higher, or as directed by your healthcare provider  Pain that gets worse in or around the infected   Redness that gets worse in or around the infected area, particularly if the area of redness expands to a wider area  Shaking chills  Swelling of the infected area  Vomiting   Date Last Reviewed: 8/1/2016  © 9062-5938 [employer]. [address] [place] [address], [place] [address]. All rights reserved. This information is not intended as a substitute for professional medical care. Always follow your healthcare professional's instructions.

## 2023-08-10 NOTE — LETTER
August 10, 2023  Abbey Vargas  [address]  [place] [address]                [place] [facility]  [address] [facility], [address]  [place] [address]  Phone: [phone]  Fax: [phone] Abbey Vargas was seen and treated in our Urgent Care department on 8/10/2023. She may return to work in 2 - 3 days.      If you have any questions or concerns, please don't hesitate to call.        Sincerely,        Amari Arce MD

## 2023-10-28 ENCOUNTER — OFFICE VISIT (OUTPATIENT)
Dept: URGENT CARE | Facility: CLINIC | Age: 73
End: 2023-10-28
Payer: MEDICARE

## 2023-10-28 VITALS
BODY MASS INDEX: 28.7 KG/M2 | DIASTOLIC BLOOD PRESSURE: 82 MMHG | OXYGEN SATURATION: 98 % | RESPIRATION RATE: 20 BRPM | SYSTOLIC BLOOD PRESSURE: 145 MMHG | WEIGHT: 162 LBS | HEIGHT: 63 IN | HEART RATE: 70 BPM | TEMPERATURE: 100 F

## 2023-10-28 DIAGNOSIS — U07.1 COVID-19 VIRUS DETECTED: ICD-10-CM

## 2023-10-28 DIAGNOSIS — R05.9 COUGH, UNSPECIFIED TYPE: Primary | ICD-10-CM

## 2023-10-28 LAB
CTP QC/QA: YES
SARS-COV-2 AG RESP QL IA.RAPID: POSITIVE

## 2023-10-28 PROCEDURE — 99214 PR OFFICE/OUTPT VISIT, EST, LEVL IV, 30-39 MIN: ICD-10-PCS | Mod: S$GLB,,, | Performed by: NURSE PRACTITIONER

## 2023-10-28 PROCEDURE — 87811 SARS CORONAVIRUS 2 ANTIGEN POCT, MANUAL READ: ICD-10-PCS | Mod: QW,S$GLB,, | Performed by: NURSE PRACTITIONER

## 2023-10-28 PROCEDURE — 99214 OFFICE O/P EST MOD 30 MIN: CPT | Mod: S$GLB,,, | Performed by: NURSE PRACTITIONER

## 2023-10-28 PROCEDURE — 87811 SARS-COV-2 COVID19 W/OPTIC: CPT | Mod: QW,S$GLB,, | Performed by: NURSE PRACTITIONER

## 2023-10-28 NOTE — PROGRESS NOTES
"Subjective:      Patient ID: Abbey Vargas is a 73 y.o. female.    Vitals:  height is 5' 3" (1.6 m) and weight is 73.5 kg (162 lb). Her oral temperature is 99.9 °F (37.7 °C). Her blood pressure is 145/82 (abnormal) and her pulse is 70. Her respiration is 20 and oxygen saturation is 98%.     Chief Complaint: Cough and Sore Throat    Patient states she started with symptoms on Thursday.  She tested this morning and was positive.     Cough  This is a new problem. The current episode started yesterday. The problem has been unchanged. The problem occurs constantly. The cough is Productive of sputum. Associated symptoms include chills, a fever, headaches, nasal congestion and a sore throat. Nothing aggravates the symptoms. She has tried OTC cough suppressant for the symptoms. The treatment provided no relief. There is no history of asthma.   Sore Throat   This is a new problem. The current episode started in the past 7 days. The problem has been gradually worsening. Maximum temperature: 100. The pain is at a severity of 8/10. The pain is moderate. Associated symptoms include coughing and headaches. Pertinent negatives include no diarrhea or vomiting. She has tried acetaminophen for the symptoms. The treatment provided mild relief.       Constitution: Positive for chills and fever. Negative for activity change and appetite change.   HENT:  Positive for sore throat.    Respiratory:  Positive for cough. Negative for chest tightness and sputum production.    Gastrointestinal:  Negative for nausea, vomiting and diarrhea.   Neurological:  Positive for headaches.      Objective:     Physical Exam   Constitutional: She is oriented to person, place, and time. normal  HENT:   Head: Normocephalic.   Ears:   Right Ear: Tympanic membrane normal.   Nose: Congestion present.   Eyes: Conjunctivae are normal. Pupils are equal, round, and reactive to light. Extraocular movement intact   Cardiovascular: Normal rate.   Pulmonary/Chest: " Effort normal.   Abdominal: Normal appearance.   Musculoskeletal: Normal range of motion.         General: Normal range of motion.   Neurological: no focal deficit. She is alert and oriented to person, place, and time.   Skin: Skin is warm. Capillary refill takes less than 2 seconds.   Nursing note and vitals reviewed.      Assessment:     1. Cough, unspecified type      Results for orders placed or performed in visit on 10/28/23   SARS Coronavirus 2 Antigen, POCT Manual Read   Result Value Ref Range    SARS Coronavirus 2 Antigen Positive (A) Negative     Acceptable Yes        Plan:       Cough, unspecified type  -     SARS Coronavirus 2 Antigen, POCT Manual Read        COVID-19 Discharge Instructions   About this topic   Coronavirus disease 2019 is also known as COVID-19. It is a viral illness that infects the lungs. It is caused by a virus called SARS-associated coronavirus (SARS-CoV-2).  The signs of COVID-19 most often start a few days after you have been infected. In some people, it takes longer to show signs. Others never show signs of the infection. You may have a cough, fever, shaking chills and it may be hard to breathe. You may be very tired, have muscle aches, a headache or sore throat. Some people have an upset stomach or loose stools. Others lose their sense of smell or taste. You may not have these signs all the time and they may come and go while you are sick.  The virus spreads easily through droplets when you talk, sneeze, or cough. You can pass the virus to others when you are talking close together, singing, hugging, sharing food, or shaking hands. Doctors believe the germs also survive on surfaces like tables, door handles, and telephones. However, this is not a common way that COVID-19 spreads. Doctors believe you can also spread the infection even if you dont have any symptoms, but they do not know how that happens. This is why getting vaccinated is one of the best ways to keep  you healthy and slow the spread of the virus.  Some people have a mild case of COVID-19 and are able to stay at home and away from others until they feel better. Others may need to be in the hospital if they are very sick. Some people with COVID-19 can have some symptoms for weeks or months. People with COVID-19 must isolate themselves. You can start to be around others when your doctor says it is safe to do so.       What care is needed at home?   Ask your doctor what you need to do when you go home. Make sure you ask questions if you do not understand what the doctor says.  Drink lots of water, juice, or broth to replace fluids lost from a fever.  You may use cool mist humidifiers to help ease congestion and coughing.  Use 2 to 3 pillows to prop yourself up when you lie down to make it easier to breathe and sleep.  Do not smoke and do not drink beer, wine, and mixed drinks (alcohol).  To lower the chance of passing the infection to others, get a COVID-19 vaccine after your infection has resolved.  If you have not been fully vaccinated:  Wear a mask over your mouth and nose if you are around others who are not sick. Cloth masks work best if they have more than one layer of fabric.  Wash your hands often.  Stay home in a separate room, if possible, away from others. Only go out to get medical care.  Use a separate bathroom if possible.  Do not make food for others.  What follow-up care is needed?   Your doctor may ask you to make visits to the office to check on your progress. Be sure to keep these visits. Make sure you wear a mask at these visits.  If you can, tell the staff you have COVID-19 ahead of time so they can take extra care to stop the disease from spreading.  It may take a few weeks before your health returns to normal.  What drugs may be needed?   The doctor may order drugs to:  Help with breathing  Help with fever  Help with swelling in your airways and lungs  Control coughing  Ease a sore throat  Help  a runny or stuffy nose  Will physical activity be limited?   You may have to limit your physical activity. Talk to your doctor about the right amount of activity for you. If you have been very sick with COVID-19, it can take some time to get your strength back.  Will there be any other care needed?   Doctors do not know how long you can pass the virus on to others after you are sick. This is why it is important to stay in a separate room, if possible, when you are sick. For now, doctors are giving general guidelines for you to follow after you have been sick. Before you go around other people, you should:  Be fever free for 24 hours without taking any drugs to lower the fever  Have no symptoms of cough or shortness of breath  Wait at least 10 days after first having symptoms or your first positive test, and you need to be symptom free as above. Some experts suggest waiting 20 days if you have had a more severe infection.  Talk with your doctor about getting a COVID-19 vaccine.  What problems could happen?   Fluid loss. This is dehydration.  Short-term or long-term lung damage  Heart problems  Death  When do I need to call the doctor?   You are having so much trouble breathing that you can only say one or two words at a time.  You need to sit upright at all times to be able to breathe and/or cannot lie down.  You are very confused or cannot stay awake.  Your lips or skin start to turn blue or grey.  You think you might be having a medical emergency. Some examples of medical emergencies are:  Severe chest pain.  Not able to speak or move normally.  You have trouble breathing when talking or sitting still.  You have new shortness of breath.  You become weak or dizzy.  You have very dark urine or do not pass urine for more than 8 hours.  You have new or worsening COVID-19 symptoms like:  Fever  Cough  Feeling very tired  Shaking chills  Headache  Trouble swallowing  Throwing up  Loose stools  Reddish purple spots on  your fingers or toes  Teach Back: Helping You Understand   The Teach Back Method helps you understand the information we are giving you. After you talk with the staff, tell them in your own words what you learned. This helps to make sure the staff has described each thing clearly. It also helps to explain things that may have been confusing. Before going home, make sure you can do these:  I can tell you about my condition.  I can tell you what may help ease my breathing.  I can tell you what I can do to help avoid passing the infection to others.  I can tell you what I will do if I have trouble breathing; feel sleepy or confused; or my fingertips, fingernails, skin, or lips are blue.  Where can I learn more?   Centers for Disease Control and Prevention  https://www.cdc.gov/coronavirus/2019-ncov/about/index.html   Centers for Disease Control and Prevention  https://www.cdc.gov/coronavirus/2019-ncov/hcp/disposition-in-home-patients.html   World Health Organization  https://www.who.int/news-room/q-a-detail/e-x-brzwclvdradyq   Last Reviewed Date   2021-10-05  Consumer Information Use and Disclaimer   This information is not specific medical advice and does not replace information you receive from your health care provider. This is only a brief summary of general information. It does NOT include all information about conditions, illnesses, injuries, tests, procedures, treatments, therapies, discharge instructions or life-style choices that may apply to you. You must talk with your health care provider for complete information about your health and treatment options. This information should not be used to decide whether or not to accept your health care providers advice, instructions or recommendations. Only your health care provider has the knowledge and training to provide advice that is right for you.  Copyright   Copyright © 2021 [employer] and its affiliates and/or licensors. All rights reserved.

## 2023-10-28 NOTE — PATIENT INSTRUCTIONS
Take over the counter medications for your symptoms  Strict follow up with primary care 2 days   Go to the emergency department for chest pain or shortness of breath  Monitor oxygen saturation - go to ED for saturation < 92%

## 2023-11-03 ENCOUNTER — PATIENT MESSAGE (OUTPATIENT)
Dept: RESEARCH | Facility: HOSPITAL | Age: 73
End: 2023-11-03
Payer: MEDICARE

## 2023-11-17 ENCOUNTER — OFFICE VISIT (OUTPATIENT)
Dept: URGENT CARE | Facility: CLINIC | Age: 73
End: 2023-11-17
Payer: MEDICARE

## 2023-11-17 VITALS
TEMPERATURE: 99 F | HEIGHT: 63 IN | DIASTOLIC BLOOD PRESSURE: 71 MMHG | WEIGHT: 162 LBS | BODY MASS INDEX: 28.7 KG/M2 | OXYGEN SATURATION: 96 % | HEART RATE: 52 BPM | SYSTOLIC BLOOD PRESSURE: 107 MMHG

## 2023-11-17 DIAGNOSIS — S03.00XA TMJ (DISLOCATION OF TEMPOROMANDIBULAR JOINT), INITIAL ENCOUNTER: Primary | ICD-10-CM

## 2023-11-17 PROCEDURE — 99214 OFFICE O/P EST MOD 30 MIN: CPT | Mod: 25,S$GLB,, | Performed by: FAMILY MEDICINE

## 2023-11-17 PROCEDURE — 96372 PR INJECTION,THERAP/PROPH/DIAG2ST, IM OR SUBCUT: ICD-10-PCS | Mod: S$GLB,,, | Performed by: FAMILY MEDICINE

## 2023-11-17 PROCEDURE — 96372 THER/PROPH/DIAG INJ SC/IM: CPT | Mod: S$GLB,,, | Performed by: FAMILY MEDICINE

## 2023-11-17 PROCEDURE — 99214 PR OFFICE/OUTPT VISIT, EST, LEVL IV, 30-39 MIN: ICD-10-PCS | Mod: 25,S$GLB,, | Performed by: FAMILY MEDICINE

## 2023-11-17 RX ORDER — KETOROLAC TROMETHAMINE 30 MG/ML
30 INJECTION, SOLUTION INTRAMUSCULAR; INTRAVENOUS ONCE
Status: COMPLETED | OUTPATIENT
Start: 2023-11-17 | End: 2023-11-17

## 2023-11-17 RX ORDER — KETOROLAC TROMETHAMINE 10 MG/1
10 TABLET, FILM COATED ORAL EVERY 6 HOURS
Qty: 20 TABLET | Refills: 0 | Status: SHIPPED | OUTPATIENT
Start: 2023-11-17 | End: 2023-12-19

## 2023-11-17 RX ADMIN — KETOROLAC TROMETHAMINE 30 MG: 30 INJECTION, SOLUTION INTRAMUSCULAR; INTRAVENOUS at 02:11

## 2023-11-17 NOTE — LETTER
November 17, 2023  Abbey Vargas  [address]  [place] [address]                [place] [facility]  [address] [facility], [address]  [place] [address]  Phone: [phone]  Fax: [phone] Abbey Vargas was seen and treated in our Urgent Care department on 11/17/2023. She may return to work in 2 - 3 days.      If you have any questions or concerns, please don't hesitate to call.        Sincerely,        Amari Arce MD

## 2023-11-17 NOTE — PROGRESS NOTES
"Subjective:      Patient ID: Abbey Vargas is a 73 y.o. female.    Vitals:  height is 5' 3" (1.6 m) and weight is 73.5 kg (162 lb). Her oral temperature is 98.7 °F (37.1 °C). Her blood pressure is 107/71 and her pulse is 52 (abnormal). Her oxygen saturation is 96%.     Chief Complaint: Otalgia    X3 days pt has had left sided jaw and ear pain.    Otalgia   There is pain in the left ear. This is a new problem. The current episode started in the past 7 days. The problem occurs constantly. The problem has been gradually worsening. There has been no fever. The fever has been present for Less than 1 day. The pain is at a severity of 8/10. The pain is moderate. Pertinent negatives include no coughing, ear discharge, headaches, rhinorrhea or sore throat. She has tried nothing for the symptoms. The treatment provided no relief. There is no history of a chronic ear infection.       Constitution: Negative.   HENT:  Positive for ear pain. Negative for ear discharge and sore throat.    Cardiovascular: Negative.    Eyes: Negative.    Respiratory: Negative.  Negative for cough.    Gastrointestinal: Negative.    Endocrine: negative.   Genitourinary: Negative.    Musculoskeletal: Negative.    Skin: Negative.    Allergic/Immunologic: Negative.    Neurological: Negative.  Negative for headaches.   Hematologic/Lymphatic: Negative.    Psychiatric/Behavioral: Negative.        Objective:     Physical Exam   HENT:   Head:           Assessment:     1. TMJ (dislocation of temporomandibular joint), initial encounter        Plan:       TMJ (dislocation of temporomandibular joint), initial encounter  -     ketorolac injection 30 mg  -     ketorolac (TORADOL) 10 mg tablet; Take 1 tablet (10 mg total) by mouth every 6 (six) hours.  Dispense: 20 tablet; Refill: 0      Please drink plenty of fluids.  Please get plenty of rest.  Please return here or go to the Emergency Department for any concerns or worsening of condition.  If you were " prescribed a narcotic medication, do not drive or operate heavy equipment or machinery while taking these medications.  If you were not prescribed an anti-inflammatory medication, and if you do not have any history of stomach/intestinal ulcers, or kidney disease, or are not taking a blood thinner such as Coumadin, Plavix, Pradaxa, Eloquis, or Xaralta for example, it is OK to take over the counter Ibuprofen or Advil or Motrin or Aleve as directed.  Do not take these medications on an empty stomach.  Rest, ice, compression and elevation to the affected joint or limb as needed.    If you were given a sling, wear it for comfort until follow up as arranged.  If you were given or placed in a splint, wear it until your follow up visit or recheck.  If you  smoke, please stop smoking.       Please follow up with your primary care doctor or specialist as needed.    Katharine Beltran NP  [phone]    You must understand that you have received treatment at an Urgent Care facility only, and that you may be  released before all of your medical problems are known or treated. Urgent Care facilities are not equipped to  handle life threatening emergencies. It is recommended that you seek care at an Emergency Department for  further evaluation of worsening or concerning symptoms, or possibly life threatening conditions as  discussed.

## 2023-11-17 NOTE — PATIENT INSTRUCTIONS

## 2024-03-24 ENCOUNTER — OFFICE VISIT (OUTPATIENT)
Dept: URGENT CARE | Facility: CLINIC | Age: 74
End: 2024-03-24
Payer: MEDICARE

## 2024-03-24 VITALS
DIASTOLIC BLOOD PRESSURE: 72 MMHG | WEIGHT: 165 LBS | HEART RATE: 67 BPM | HEIGHT: 63 IN | BODY MASS INDEX: 29.23 KG/M2 | RESPIRATION RATE: 17 BRPM | OXYGEN SATURATION: 98 % | SYSTOLIC BLOOD PRESSURE: 110 MMHG | TEMPERATURE: 98 F

## 2024-03-24 DIAGNOSIS — J01.40 ACUTE PANSINUSITIS, RECURRENCE NOT SPECIFIED: ICD-10-CM

## 2024-03-24 DIAGNOSIS — R09.81 NASAL CONGESTION: Primary | ICD-10-CM

## 2024-03-24 LAB
CTP QC/QA: YES
CTP QC/QA: YES
POC MOLECULAR INFLUENZA A AGN: NEGATIVE
POC MOLECULAR INFLUENZA B AGN: NEGATIVE
SARS-COV-2 AG RESP QL IA.RAPID: NEGATIVE

## 2024-03-24 PROCEDURE — 87502 INFLUENZA DNA AMP PROBE: CPT | Mod: QW,S$GLB,, | Performed by: NURSE PRACTITIONER

## 2024-03-24 PROCEDURE — 87811 SARS-COV-2 COVID19 W/OPTIC: CPT | Mod: QW,S$GLB,, | Performed by: NURSE PRACTITIONER

## 2024-03-24 PROCEDURE — 99214 OFFICE O/P EST MOD 30 MIN: CPT | Mod: S$GLB,,, | Performed by: NURSE PRACTITIONER

## 2024-03-24 RX ORDER — AZITHROMYCIN 250 MG/1
TABLET, FILM COATED ORAL
Qty: 6 TABLET | Refills: 0 | Status: SHIPPED | OUTPATIENT
Start: 2024-03-24 | End: 2024-03-29

## 2024-03-24 NOTE — PROGRESS NOTES
"Subjective:      Patient ID: Abbey Vargas is a 73 y.o. female.    Vitals:  height is 5' 3" (1.6 m) and weight is 74.8 kg (165 lb). Her oral temperature is 98.4 °F (36.9 °C). Her blood pressure is 110/72 and her pulse is 67. Her respiration is 17 and oxygen saturation is 98%.     Chief Complaint: Cough    Patient presents with cough  with clear sputum and sore throat that started yesterday . She states she has been having post nasal drip for a week . Patient  denies fever . No other symptoms reported.     Cough  This is a new problem. The current episode started yesterday. The problem has been unchanged. The problem occurs hourly. The cough is Productive of sputum. Associated symptoms include nasal congestion and a sore throat. Pertinent negatives include no chills, ear congestion, ear pain or myalgias. Treatments tried: otc cough and cold. The treatment provided mild relief. There is no history of asthma, bronchitis or pneumonia.       Constitution: Negative for chills.   HENT:  Positive for sore throat. Negative for ear pain.    Respiratory:  Positive for cough.    Musculoskeletal:  Negative for muscle ache.      Objective:     Physical Exam   Constitutional:  Non-toxic appearance. She does not appear ill. No distress. normal  HENT:   Head: Normocephalic.   Ears:   Right Ear: Tympanic membrane normal.   Nose: Congestion present.   Eyes: Conjunctivae are normal. Pupils are equal, round, and reactive to light. Extraocular movement intact   Cardiovascular: Normal rate.   Pulmonary/Chest: Effort normal and breath sounds normal.   Abdominal: Normal appearance and bowel sounds are normal.   Neurological: no focal deficit. She is alert and at baseline.   Skin: Skin is warm. Capillary refill takes less than 2 seconds.   Nursing note and vitals reviewed.    Assessment:     1. Nasal congestion    2. Acute pansinusitis, recurrence not specified      Results for orders placed or performed in visit on 03/24/24   SARS " Coronavirus 2 Antigen, POCT Manual Read   Result Value Ref Range    SARS Coronavirus 2 Antigen Negative Negative     Acceptable Yes    POCT Influenza A/B MOLECULAR   Result Value Ref Range    POC Molecular Influenza A Ag Negative Negative, Not Reported    POC Molecular Influenza B Ag Negative Negative, Not Reported     Acceptable Yes        Plan:       Nasal congestion  -     SARS Coronavirus 2 Antigen, POCT Manual Read  -     POCT Influenza A/B MOLECULAR    Acute pansinusitis, recurrence not specified    Other orders  -     azithromycin (Z-DARWIN) 250 MG tablet; Take 2 tablets by mouth on day 1; Take 1 tablet by mouth on days 2-5  Dispense: 6 tablet; Refill: 0

## 2024-04-15 ENCOUNTER — OFFICE VISIT (OUTPATIENT)
Dept: URGENT CARE | Facility: CLINIC | Age: 74
End: 2024-04-15
Payer: MEDICARE

## 2024-04-15 VITALS
BODY MASS INDEX: 29.23 KG/M2 | HEIGHT: 63 IN | OXYGEN SATURATION: 98 % | RESPIRATION RATE: 19 BRPM | HEART RATE: 82 BPM | WEIGHT: 165 LBS | DIASTOLIC BLOOD PRESSURE: 69 MMHG | TEMPERATURE: 99 F | SYSTOLIC BLOOD PRESSURE: 108 MMHG

## 2024-04-15 DIAGNOSIS — B96.89 ACUTE BACTERIAL SINUSITIS: Primary | ICD-10-CM

## 2024-04-15 DIAGNOSIS — J40 BRONCHITIS: ICD-10-CM

## 2024-04-15 DIAGNOSIS — J01.90 ACUTE BACTERIAL SINUSITIS: Primary | ICD-10-CM

## 2024-04-15 DIAGNOSIS — R49.0 HOARSENESS: ICD-10-CM

## 2024-04-15 PROCEDURE — 99213 OFFICE O/P EST LOW 20 MIN: CPT | Mod: S$GLB,,, | Performed by: NURSE PRACTITIONER

## 2024-04-15 RX ORDER — PREDNISONE 20 MG/1
TABLET ORAL
Qty: 4 TABLET | Refills: 0 | Status: SHIPPED | OUTPATIENT
Start: 2024-04-15 | End: 2024-04-18

## 2024-04-15 RX ORDER — DOXYCYCLINE 100 MG/1
100 CAPSULE ORAL EVERY 12 HOURS
Qty: 14 CAPSULE | Refills: 0 | Status: SHIPPED | OUTPATIENT
Start: 2024-04-15 | End: 2024-04-22

## 2024-04-15 NOTE — PATIENT INSTRUCTIONS
ACUTE SINUSITIS  Acute sinusitis is an inflammation of the mucous membranes inside the nose and sinuses and lasts for less than 4 weeks. Acute sinusitis is common and often follows a cold.     Acute sinusitis causes thick, discolored mucus that drains from the nose or down the back of the throat. It also can cause pain and pressure in your head and face along with a stuffy or blocked nose. In most cases, sinusitis gets better on its own in 1 to 2 weeks. The primary treatment for sinusitis involves symptom relief.      Not all colored drainage means that there is a bacterial infection present. According to the [employer], only 2% of colds will progress to result in bacterial sinusitis requiring antibiotics.    If the doctor prescribed antibiotics, take them as directed. Do not stop taking them just because you feel better. You need to take the full course of antibiotics.  ACUTE SINUSITIS TREATMENT     The following are symptomatic treatments to relieve symptoms of discomfort and congestion. These treatments do not shorten the duration of illness.    Pain relief   Nonprescription pain medications, such as acetaminophen (Tylenol) or ibuprofen (Motrin, Advil), are recommended for pain.    Nasal saline   Nasal saline is available over the counter. There are several different commercial preparations such as Ocean spray and Ayr spray. There is no limit on the use of Nasal saline. Saline is used by snorting the mist up into the nose then later gently blowing the nose to get rid of any secretions that it has loosened.    Nasal irrigation  Flushing the nose and sinuses with a saline solution several times per day can decrease pain associated with congestion and shorten the duration of symptoms.     Nasal steroids   Nasal steroids, such as Flonase, can be beneficial and help reduce swelling inside the nose. These drugs have few side effects and relieve symptoms in most people. Unfortunately, they do not begin  to work for 2-3 days and do not reach their maximum benefit for approximately 2-3 weeks.   There are several nasal steroids available by prescription as well as a few that can be purchased without a prescription (over the counter). These drugs are all effective but differ in how frequently they must be used and how much they cost.    Nasal anticholinergics  Ipratropium bromide (nasal spray) is available by prescription and can be very effective in decreasing the symptom of runny nose and other related symptoms (eg, post-nasal drainage, sore throat). These sprays, like all medications, can interact with other medications, so it is important that your complete medication list be reviewed by your physician before you take this medication.    Oral decongestants  Oral decongestants (most commonly pseudoephedrine) may be helpful if you have associated symptoms of ear pain or fullness.    Nasal decongestant sprays   Nasal decongestant sprays, including oxymetazoline (Afrin) and phenylephrine (Lloyd-Synephrine), can be used to temporarily treat congestion. However, these sprays should not be used for more than two to three days due to the risk of rebound congestion (when the nose becomes congested constantly unless the medication is used repeatedly), possible addiction, and long-term consequences of frequent use, including persistent nasal dryness and crusting, which is very difficult to treat once it has developed.    Oral antihistamines   Oral antihistamines (such as diphenhydramine /Benadryl) are not proven to improve symptoms of sinusitis and can have unwanted side effects.    Mucolytics   Medications to thin secretions (such as guaifenesin) may help to clear mucus.    Steam inhalation   Breathing in warm, moist air (steam) may temporarily relieve congestion, but there is no evidence that it will shorten the duration or severity of symptoms. If you choose to try this, be sure that the water you use to make steam is clean  and free of mold or other contaminants.    Seek medical attention immediately if you develop new or worsening symptoms, including but not limited to:  New or worse swelling, redness, or pain in your face or around one or both of your eyes.  Double vision or a change in your vision.  High fever  Severe headache and a stiff neck.  Mental changes, such as feeling confused or much less alert.    If your condition fails to improve in a timely manner, you should receive another evaluation by your Primary Care Provider to discuss your concerns or return to urgent care for a recheck.      **You must understand that you have received Urgent Care treatment only and that you may be released before all your medical problems are known or treated. You, the patient, are responsible to arrange for follow-up care as instructed.      UPPER RESPIRATORY INFECTIONS (COMMON COLD)    An upper respiratory infection is also called a cold. It can affect your nose, throat, ears, and sinuses.    A cold is contagious and may be spread to others through coughing, sneezing, or close contact. It can also stay on objects and surfaces. You can become infected if you touch the object or surface and then touch your eyes, mouth, or nose.    Colds are caused by viruses and do not get better with antibiotics. Most people get better in 7 to 14 days. You may continue to cough for 2 to 3 weeks.      Criteria for antibiotics include:  Upper respiratory infections lasting longer than 10-14 days without improvement.  New or worsening symptoms after 5 to 7 days  Worsening symptoms after initial improvement.   Co-existing infection such as Acute Otitis Media (ear infection).     The use of antibiotics for nonbacterial upper respiratory infections has resulted in a severe problem with the emergence of bacteria which are resistant to multiple forms of antibiotics, and some bacteria are currently only treatable with intravenous antibiotics.    The following are  suggestions to help you with upper respiratory symptoms.    Body Aches/Pains/Fever  For patients who are not allergic to and are not on anticoagulants, you can alternate Tylenol every 4 hours and Motrin every 6 hours for fever above 100.4F and/or pain.  For patients who are allergic or intolerant to NSAIDS, have gastritis, gastric ulcers, or history of GI bleeds, are pregnant, or are on anticoagulant therapy, you can take Tylenol every 4 hours as needed for fever above 100.4F and/or pain.    Congestion:    Nasal Saline   Nasal saline is available over the counter. There are several different commercial preparations such as Ocean spray and Ayr spray. There is no limit on the use of Nasal saline. Saline is used by snorting the mist up into the nose then later gently blowing the nose to get rid of any secretions that it has loosened.    Nasal irrigation   Flushing the nose and sinuses with a saline solution several times per day can decrease pain associated with congestion and shorten the duration of symptoms.     Decongestant Nasal Sprays  Over-the-counter decongestant nasal spray such as Afrin, may be helpful as an initial step in treating upper respiratory infections. This spray can be used for up to approximately 3 days and is used no more than twice per day. Topical nasal decongestant spray for longer than 5 days will result in a physical addiction, in which the nasal lining will become significantly swollen and irritated until the spray is used again. They May also cause elevated blood pressure.    Nasal Steroids  Nasal steroids, such as Flonase, can be beneficial and help reduce swelling inside the nose. These drugs have few side effects and relieve symptoms in most people. Unfortunately, they do not begin to work for 2-3 days and do not reach their maximum benefit for approximately 2-3 weeks.   There are several nasal steroids available by prescription as well as a few that can be purchased without a  prescription (over the counter). These drugs are all effective but differ in how frequently they must be used and how much they cost.    Nasal anticholinergics  Ipratropium bromide (nasal spray) is available by prescription and can be very effective in decreasing the symptom of runny nose and other related symptoms (eg, post-nasal drainage, sore throat). These sprays, like all medications, can interact with other medications, so it is important that your complete medication list be reviewed by your physician before you take this medication.    If you develop a bloody nose, stop using the medication immediately.    Oral Decongestant  Use pseudoephedrine (behind the counter) for sinus pressure and congestion- Pseudoephedrine 30 mg up to 240 mg /day. Common brands include Sudafed, Zephrex-D Wal-phed.  Warning: It can raise your blood pressure and give you palpitations, avoid with history of high blood pressure, palpitations, and severe cardiac disease.  Coricidin HBP is okay to use if you have high blood pressure.     Mucous Thinners/Decongestant Combination   Mucous thinners and decongestants are used to shrink down the tissues and promote sinus drainage. There are multiple prescription and over-the-counter medications available. A mucous thinner will tend to be drying unless you are also drinking plenty of water when taking these. If you have high blood pressure, it is very important to monitor your pressure while on decongestants. The mucous thinner/decongestant combinations are typically given twice per day. However, some people will be unable to tolerate these at night and should only take them once per day.    Clear Runny Nose/Allergic Rhinitis:  Use an antihistamine to help dry you out or nasal anticholinergics as mentioned above.     Antihistamines  Antihistamines are available both over the counter and as a prescription. There are also various decongestant and antihistamine combinations available such as  Claritin, Allegra, and Zyrtec. It is best to take any antihistamine-decongestant combination in the morning to avoid insomnia. Zyrtec should probably be taken at night, to reduce the chance of sleepiness during the daytime. If there is a significant infection present and secretions are already thickened, it is recommended to discontinue antihistamines and use a mucous thinner/decongestant combination.    Oral Steroids  Oral steroids can be used with more severe infections. Often, they are the only medications that will reduce the symptoms of pressure and allow the nasal sinuses to drain. These are best taken on a full stomach and earlier in the day is better. They may give you some irritability, stomach upset, or hyperactivity. This can also interfere with sleep.     A person who has high blood pressure or diabetes should be very careful and monitor their blood pressure or blood glucose while taking steroids. Steroids can have multiple side effects especially when taken long-term. Short-term doses are usually very well tolerated and effective in controlling the symptoms associated with sinus infections and severe allergies. The use of steroids for greater than approximately seven days requires a tapering down to discontinue them. You should not abruptly stop your steroid if you have been taking the same dose for greater than 7 days.    Body Aches/Pains/Fever  For patients who are not allergic to and are not on anticoagulants, you can alternate Tylenol every 4 hours and Motrin every 6 hours for fever above 100.4F and/or pain.  For patients who are allergic or intolerant to NSAIDS, have gastritis, gastric ulcers, or history of GI bleeds, are pregnant, or are on anticoagulant therapy, you can take Tylenol every 4 hours as needed for fever above 100.4F and/or pain.     Maintain adequate hydration - Rest and keep yourself/patient well hydrated. For adults, it is recommended to drink at least 8 glasses of water daily.   This may help thin secretions and soothe the respiratory mucosa.     Sore Throat  Perform warm, saltwater gargles (1/2 tsp salt to 1 cup warm water) to help reduce inflammation and throat discomfort. Chloraseptic sprays and throat lozenges will also help with your throat pain.    COUGH  A viral cough may linger for 3 to 4 weeks but should steadily improve over time. Coughing is the body's natural way to clear mucus and help get rid of bacteria and viruses. Therefore, cough suppressants are usually not recommended.      Use Mucinex (guaifenesin) up to 2400mg/day to help clear and break up/loosen mucus/congestion from the chest when you have a cold or flu.      Common cough suppressants include the ingredient dextromethorphan or DM, (such as Mucinex DM) available over the counter and can be used for cough to stop the tickle in the back of your throat.      ? Honey may be beneficial, especially on nocturnal cough 1 to 2 teaspoons can be taken straight or diluted in tea, juice or other liquid.    The antioxidants in honey are an important contributor to its decongestant properties. Darker honey contains more antioxidants. Buckwheat and avocado honey are particularly good choices. If these honeys are not available in your area, choose the darkest honey you can find.    It is important to follow up for Re-evaluation if new or worsening symptoms develop or symptoms exceed the expected duration of common cold.     Worsening or persistent symptoms may indicate the development of complications or the need to consider a diagnosis other than the common cold requiring an antibiotic. (eg, acute bacterial sinusitis, pneumonia, pertussis).    Go to Emergency Department or call 911 if you develop new or worsening symptoms including but not limited to:  Trouble breathing.  New or worsening chest discomfort.  Feel confused or disoriented.  Vomiting and can't keep liquids down.  Develop signs of fluid loss, such as dark-colored urine  and muscle cramps.    **You must understand that you have received Urgent Care treatment only and that you may be released before all your medical problems are known or treated. You, the patient, are responsible to arrange for follow-up care as instructed.

## 2024-04-15 NOTE — PROGRESS NOTES
"Subjective:      Patient ID: Abbey Vargas is a 73 y.o. female.    Vitals:  height is 5' 3" (1.6 m) and weight is 74.8 kg (165 lb). Her oral temperature is 98.9 °F (37.2 °C). Her blood pressure is 108/69 and her pulse is 82. Her respiration is 19 and oxygen saturation is 98%.     Chief Complaint: Cough and Sore Throat    PT stated she's been coughing since her last visit, in March. Was prescribed medication but never took them.     Cough  This is a new problem. The current episode started 1 to 4 weeks ago. The problem has been gradually worsening. The problem occurs constantly. Associated symptoms include nasal congestion, postnasal drip and a sore throat. Pertinent negatives include no chills, ear pain, fever or headaches. Nothing aggravates the symptoms. She has tried nothing for the symptoms. The treatment provided no relief.   Sore Throat   This is a new problem. The current episode started 1 to 4 weeks ago. The problem has been gradually worsening. There has been no fever. The pain is at a severity of 3/10. The pain is mild. Associated symptoms include congestion, coughing, a hoarse voice and swollen glands. Pertinent negatives include no abdominal pain, ear discharge, ear pain, headaches or neck pain. She has tried nothing for the symptoms. The treatment provided no relief.   Sinusitis  This is a new problem. The current episode started 1 to 4 weeks ago. The problem has been gradually worsening since onset. There has been no fever. Associated symptoms include congestion, coughing, a hoarse voice, a sore throat and swollen glands. Pertinent negatives include no chills, ear pain, headaches or neck pain.       Constitution: Negative for chills and fever.   HENT:  Positive for congestion, postnasal drip and sore throat. Negative for ear pain and ear discharge.    Neck: Negative for neck pain.   Respiratory:  Positive for cough.    Gastrointestinal:  Negative for abdominal pain.   Neurological:  Negative for " headaches.      Objective:     Physical Exam   Constitutional: She is oriented to person, place, and time. She appears well-developed. She is cooperative.  Non-toxic appearance. She appears ill. No distress.   HENT:   Head: Normocephalic and atraumatic.   Ears:   Right Ear: External ear and ear canal normal. A middle ear effusion is present.   Left Ear: External ear and ear canal normal. A middle ear effusion is present.   Nose: Mucosal edema, rhinorrhea and purulent discharge present. No nasal deformity. No epistaxis. Right sinus exhibits maxillary sinus tenderness and frontal sinus tenderness. Left sinus exhibits maxillary sinus tenderness and frontal sinus tenderness.   Mouth/Throat: Uvula is midline and mucous membranes are normal. No trismus in the jaw. Normal dentition. No uvula swelling. Posterior oropharyngeal erythema present. No oropharyngeal exudate or posterior oropharyngeal edema.   Hoarseness      Comments: Hoarseness  Eyes: Conjunctivae and lids are normal. No scleral icterus.   Neck: Trachea normal and phonation normal. Neck supple. No edema present. No erythema present. No neck rigidity present.   Cardiovascular: Normal rate, regular rhythm, normal heart sounds and normal pulses.   Pulmonary/Chest: Effort normal. No accessory muscle usage. No tachypnea. No respiratory distress. She has no wheezes. She has no rhonchi. She has no rales.   Persistent cough noted during exam (alt wet and dry). .          Comments: Persistent cough noted during exam (alt wet and dry). .     Abdominal: Normal appearance.   Musculoskeletal: Normal range of motion.         General: No deformity. Normal range of motion.   Neurological: She is alert and oriented to person, place, and time. She exhibits normal muscle tone. Coordination normal.   Skin: Skin is warm, dry, intact, not diaphoretic and not pale.   Psychiatric: Her speech is normal and behavior is normal. Judgment and thought content normal.   Nursing note and  vitals reviewed.      Assessment:     1. Acute bacterial sinusitis    2. Bronchitis    3. Hoarseness        Plan:       Acute bacterial sinusitis  -     doxycycline (VIBRAMYCIN) 100 MG Cap; Take 1 capsule (100 mg total) by mouth every 12 (twelve) hours. for 7 days  Dispense: 14 capsule; Refill: 0  -     predniSONE (DELTASONE) 20 MG tablet; Take 2 tablets (40 mg) by mouth x1 days, then 1 tablet (20 mg) by mouth x2 days  Dispense: 4 tablet; Refill: 0    Bronchitis  -     predniSONE (DELTASONE) 20 MG tablet; Take 2 tablets (40 mg) by mouth x1 days, then 1 tablet (20 mg) by mouth x2 days  Dispense: 4 tablet; Refill: 0    Hoarseness  -     predniSONE (DELTASONE) 20 MG tablet; Take 2 tablets (40 mg) by mouth x1 days, then 1 tablet (20 mg) by mouth x2 days  Dispense: 4 tablet; Refill: 0

## 2024-04-18 ENCOUNTER — OFFICE VISIT (OUTPATIENT)
Dept: URGENT CARE | Facility: CLINIC | Age: 74
End: 2024-04-18
Payer: MEDICARE

## 2024-04-18 VITALS
RESPIRATION RATE: 17 BRPM | OXYGEN SATURATION: 96 % | HEIGHT: 63 IN | TEMPERATURE: 99 F | HEART RATE: 54 BPM | WEIGHT: 165 LBS | SYSTOLIC BLOOD PRESSURE: 117 MMHG | BODY MASS INDEX: 29.23 KG/M2 | DIASTOLIC BLOOD PRESSURE: 71 MMHG

## 2024-04-18 DIAGNOSIS — J98.01 COUGH DUE TO BRONCHOSPASM: ICD-10-CM

## 2024-04-18 DIAGNOSIS — J20.9 ACUTE BRONCHITIS, UNSPECIFIED ORGANISM: Primary | ICD-10-CM

## 2024-04-18 PROCEDURE — 99213 OFFICE O/P EST LOW 20 MIN: CPT | Mod: 25,S$GLB,, | Performed by: PHYSICIAN ASSISTANT

## 2024-04-18 PROCEDURE — 71046 X-RAY EXAM CHEST 2 VIEWS: CPT | Mod: S$GLB,,, | Performed by: RADIOLOGY

## 2024-04-18 RX ORDER — ALBUTEROL SULFATE 0.63 MG/3ML
0.63 SOLUTION RESPIRATORY (INHALATION)
Status: DISCONTINUED | OUTPATIENT
Start: 2024-04-18 | End: 2024-04-18

## 2024-04-18 RX ORDER — ALBUTEROL SULFATE 0.83 MG/ML
2.5 SOLUTION RESPIRATORY (INHALATION) EVERY 6 HOURS PRN
Qty: 1 EACH | Refills: 0 | Status: SHIPPED | OUTPATIENT
Start: 2024-04-18 | End: 2024-05-26

## 2024-04-18 RX ORDER — PREDNISONE 20 MG/1
20 TABLET ORAL DAILY
Qty: 5 TABLET | Refills: 0 | Status: SHIPPED | OUTPATIENT
Start: 2024-04-18 | End: 2024-04-23

## 2024-04-18 RX ORDER — BENZONATATE 200 MG/1
200 CAPSULE ORAL 3 TIMES DAILY PRN
Qty: 30 CAPSULE | Refills: 0 | Status: SHIPPED | OUTPATIENT
Start: 2024-04-18 | End: 2024-04-28

## 2024-04-18 RX ORDER — ALBUTEROL SULFATE 0.83 MG/ML
2.5 SOLUTION RESPIRATORY (INHALATION)
Status: COMPLETED | OUTPATIENT
Start: 2024-04-18 | End: 2024-04-18

## 2024-04-18 RX ADMIN — ALBUTEROL SULFATE 2.5 MG: 0.83 SOLUTION RESPIRATORY (INHALATION) at 12:04

## 2024-04-18 NOTE — PROGRESS NOTES
"Subjective:      Patient ID: Abbey Vargas is a 73 y.o. female.    Vitals:  height is 5' 3" (1.6 m) and weight is 74.8 kg (165 lb). Her oral temperature is 98.6 °F (37 °C). Her blood pressure is 117/71 and her pulse is 54 (abnormal). Her respiration is 17 and oxygen saturation is 96%.     Chief Complaint: Cough    Pt states she came in office Monday and was instructed to come back if not better. Reports her sinus symptoms have improved however still with productive cough that has been persistent since Monday. Reports complaint with doxy and prednisone. States that she completed the prednisone yesterday.     Cough  This is a new problem. Episode onset: 4 days. The problem has been gradually worsening. The problem occurs constantly. The cough is Productive of sputum. Associated symptoms include nasal congestion and a sore throat. Pertinent negatives include no headaches or shortness of breath. Associated symptoms comments: Hoarse voice. Nothing aggravates the symptoms. Treatments tried: prednazone- 4 day supply, doxy. The treatment provided mild relief. There is no history of asthma, bronchitis or COPD.       HENT:  Positive for sore throat.    Respiratory:  Positive for cough. Negative for shortness of breath.    Neurological:  Negative for headaches.      Objective:     Physical Exam   Constitutional: She is oriented to person, place, and time. She appears well-developed. She is cooperative.  Non-toxic appearance. She does not appear ill. No distress.   HENT:   Head: Normocephalic and atraumatic.   Ears:   Right Ear: Hearing and external ear normal.   Left Ear: Hearing and external ear normal.   Nose: Nose normal. No rhinorrhea or congestion.   Mouth/Throat: Mucous membranes are moist. No oropharyngeal exudate or posterior oropharyngeal erythema. Oropharynx is clear.   Eyes: Conjunctivae and lids are normal. No scleral icterus.   Neck: Phonation normal. Neck supple.   Cardiovascular: Normal rate, regular " rhythm and normal heart sounds.   No murmur heard.Exam reveals no gallop.   Pulmonary/Chest: Effort normal. No stridor. No respiratory distress. She has no wheezes. She has rhonchi (thoughout). She has no rales.         Comments: + rhonchi, bronchospasms and coughing    Abdominal: Normal appearance.   Neurological: She is alert and oriented to person, place, and time. Coordination normal.   Skin: Skin is intact, not diaphoretic and not pale.   Psychiatric: Her speech is normal and behavior is normal. Judgment and thought content normal.   Nursing note and vitals reviewed.      Assessment:     1. Acute bronchitis, unspecified organism    2. Cough due to bronchospasm        Plan:       Acute bronchitis, unspecified organism  -     NEBULIZER KIT (SUPPLIES) FOR HOME USE  -     albuterol (PROVENTIL) 2.5 mg /3 mL (0.083 %) nebulizer solution; Take 3 mLs (2.5 mg total) by nebulization every 6 (six) hours as needed for Wheezing or Shortness of Breath. Rescue  Dispense: 1 each; Refill: 0  -     predniSONE (DELTASONE) 20 MG tablet; Take 1 tablet (20 mg total) by mouth once daily. for 5 days  Dispense: 5 tablet; Refill: 0  -     benzonatate (TESSALON) 200 MG capsule; Take 1 capsule (200 mg total) by mouth 3 (three) times daily as needed for Cough.  Dispense: 30 capsule; Refill: 0    Cough due to bronchospasm  -     XR CHEST PA AND LATERAL; Future; Expected date: 04/18/2024  -     Discontinue: albuterol nebulizer solution 0.63 mg  -     albuterol nebulizer solution 2.5 mg  -     benzonatate (TESSALON) 200 MG capsule; Take 1 capsule (200 mg total) by mouth 3 (three) times daily as needed for Cough.  Dispense: 30 capsule; Refill: 0      XR CHEST PA AND LATERAL    Result Date: 4/18/2024  EXAMINATION: XR CHEST PA AND LATERAL TECHNIQUE: PA and lateral views of the chest were performed. COMPARISON: 09/19/2022 FINDINGS: The cardiac silhouette is normal in size, midline. The pulmonary vascularity is normal. The pulmonary alyssia appear  normal bilaterally. The lungs are well expanded and clear. No focal area of airspace consolidation.  Small linear area of scarring at the left lung base. No pleural effusion, no pneumothorax. The osseous structures appear within normal limits for age.     Minimal scarring left lung base. Electronically signed by: Nidhi Staley MD Date:    04/18/2024 Time:    13:01     1:05 PM after treatment patient reports feeling the same however lungs are now clearing and she is having less coughing.   Will treat for bronchitis. She can continue antibiotics as prescribed. Will do another round of oral steroids as well as albuterol nebs at home and something to help with cough.     Alternate ibuprofen and tylenol as needed for fever/pain/body aches every 4-6 hours. Rest, increase PO fluids.   Discussed with patient the importance of f/u with their primary care provider. Urged to go to the ER for any worsening signs or symptoms.

## 2024-04-19 ENCOUNTER — PATIENT MESSAGE (OUTPATIENT)
Dept: ADMINISTRATIVE | Facility: HOSPITAL | Age: 74
End: 2024-04-19
Payer: MEDICARE

## 2024-04-19 ENCOUNTER — PATIENT OUTREACH (OUTPATIENT)
Dept: ADMINISTRATIVE | Facility: HOSPITAL | Age: 74
End: 2024-04-19
Payer: MEDICARE

## 2024-04-19 DIAGNOSIS — Z12.31 ENCOUNTER FOR SCREENING MAMMOGRAM FOR BREAST CANCER: Primary | ICD-10-CM

## 2024-04-22 ENCOUNTER — TELEPHONE (OUTPATIENT)
Dept: ADMINISTRATIVE | Facility: HOSPITAL | Age: 74
End: 2024-04-22

## 2024-04-22 DIAGNOSIS — N18.31 STAGE 3A CHRONIC KIDNEY DISEASE: Primary | ICD-10-CM

## 2024-04-22 NOTE — TELEPHONE ENCOUNTER
Patient needing order for Microalbumin/Creatinine Ratio Urine.    Order pended for Annual Urine to Creatinine Ratio (UACr) per guidelines: patients who have a diagnosis of CKD stages 3-5 documented on the problem list, medical history or two encounter diagnosis who had a Microalbumin Creatinine Ratio Urine done within the last 12 months.     Order pended

## 2024-05-26 ENCOUNTER — OFFICE VISIT (OUTPATIENT)
Dept: URGENT CARE | Facility: CLINIC | Age: 74
End: 2024-05-26
Payer: MEDICARE

## 2024-05-26 VITALS
DIASTOLIC BLOOD PRESSURE: 68 MMHG | HEART RATE: 76 BPM | RESPIRATION RATE: 17 BRPM | SYSTOLIC BLOOD PRESSURE: 101 MMHG | OXYGEN SATURATION: 97 % | BODY MASS INDEX: 29 KG/M2 | WEIGHT: 163.69 LBS | TEMPERATURE: 98 F | HEIGHT: 63 IN

## 2024-05-26 DIAGNOSIS — R10.31 ABDOMINAL PAIN, RLQ: Primary | ICD-10-CM

## 2024-05-26 DIAGNOSIS — R31.9 URINARY TRACT INFECTION WITH HEMATURIA, SITE UNSPECIFIED: ICD-10-CM

## 2024-05-26 DIAGNOSIS — N39.0 URINARY TRACT INFECTION WITH HEMATURIA, SITE UNSPECIFIED: ICD-10-CM

## 2024-05-26 LAB
BILIRUB UR QL STRIP: NEGATIVE
GLUCOSE UR QL STRIP: NEGATIVE
KETONES UR QL STRIP: NEGATIVE
LEUKOCYTE ESTERASE UR QL STRIP: POSITIVE
PH, POC UA: 6 (ref 5–8)
POC BLOOD, URINE: POSITIVE
POC NITRATES, URINE: POSITIVE
PROT UR QL STRIP: NEGATIVE
SP GR UR STRIP: 1.02 (ref 1–1.03)
UROBILINOGEN UR STRIP-ACNC: 0.2 (ref 0.1–1.1)

## 2024-05-26 PROCEDURE — 99214 OFFICE O/P EST MOD 30 MIN: CPT | Mod: S$GLB,,, | Performed by: NURSE PRACTITIONER

## 2024-05-26 PROCEDURE — 81003 URINALYSIS AUTO W/O SCOPE: CPT | Mod: QW,S$GLB,, | Performed by: NURSE PRACTITIONER

## 2024-05-26 RX ORDER — CEPHALEXIN 500 MG/1
500 CAPSULE ORAL EVERY 12 HOURS
Qty: 14 CAPSULE | Refills: 0 | Status: SHIPPED | OUTPATIENT
Start: 2024-05-26 | End: 2024-06-02

## 2024-05-26 NOTE — PATIENT INSTRUCTIONS
Urinary Tract Infection, Adult   General Information   You came to the Urgent Care for a urinary tract infection or UTI. Most UTIs are infections in either your bladder or your kidneys. Bladder infections are more common and may also be called cystitis. Kidney infections are more serious and may also be called pyelonephritis. You need antibiotics to treat a UTI. It is important to take all of your antibiotics even if you start to feel better.  What care is needed at home?   Call your regular doctor to let them know you were here at [facility]. Make a follow-up appointment if you were told to.  For the first day or so, you may want to take an over-the-counter medicine, like phenazopyridine. This will help to numb your bladder. You will also not have the strong urge to urinate. This medicine causes your urine and tears to look orange. If you have kidney disease, talk to your doctor before taking this medicine.  To lower your chance of getting a UTI in the future, you can:  Drink extra fluids.  If you have sex, urinate right afterwards.  When do I need to get emergency help?   Go to the ED if:   You have very bad pain in your back, shoulder, or belly.  You have a fever of 102.2°F (39°C); shaking chills or sweats even though you are taking antibiotics.  When do I need to call the doctor?   You have a fever up to 100.4°F (38°C).  You notice more blood in your urine.  Your signs get worse or do not improve within 24 hours of starting treatment.  You are not able to urinate for more than 8 hours  Your signs come back after treatment has stopped.  You have new or worsening symptoms.  Last Reviewed Date   2021-03-12  Consumer Information Use and Disclaimer   This information is not specific medical advice and does not replace information you receive from your health care provider. This is only a brief summary of general information. It does NOT include all information about conditions, illnesses, injuries, tests,  "procedures, treatments, therapies, discharge instructions or life-style choices that may apply to you. You must talk with your health care provider for complete information about your health and treatment options. This information should not be used to decide whether or not to accept your health care providers advice, instructions or recommendations. Only your health care provider has the knowledge and training to provide advice that is right for you.  Copyright   Copyright © 2021 [employer] and its affiliates and/or licensors. All rights reserved.           *Urinary Tract Infections*  1) Avoid tub baths.  2) Always urinate after intercourse(Teens/Adults).  3) Avoid "Fizzy" drinks/soda drinks.  4) Always wipe from front to back.  5) Wear only cotton underwear.  6) Drink a lot of fluids (at least 8-10 glasses of water) for 5 to 7 days to help flush your kidneys. You can also drink 1 shot-sized glass of cranberry juice 3X daily over the next several days to help cleanse your bladder, but studies show that cranberry juice does not cure or prevent a UTI.   7) Take all medications as directed. Make sure to complete all antibiotics as prescribed.    8) For patients above 6 months of age who are not allergic to and are not on anticoagulants, you can alternate Tylenol and Motrin every 4-6 hours for fever above 100.4F and/or pain.  For patients less than 6 months of age, allergic to or intolerant to NSAIDS, have gastritis, gastric ulcers, or history of GI bleeds, are pregnant, or are on anticoagulant therapy, you can take Tylenol every 4 hours as needed for fever above 100.4F and/or pain.   9) You should schedule a follow-up appointment with your Primary Care Provider/Pediatrician for recheck in 2-3 days or as directed at this visit.   10) If your condition fails to improve in a timely manner, you should receive another evaluation by your Primary Care Provider/Pediatrician to discuss your concerns or return to urgent " care for a recheck.  If your condition worsens at any time, you should report immediately to your nearest Emergency Department for further evaluation. **You must understand that you have received Urgent Care treatment only and that you may be released before all of your medical problems are known or treated. You, the patient, are responsible to arrange for follow-up care as instructed.     ABDOMINAL PAIN     Based on your visit today, the exact cause of your abdominal (stomach) pain is not certain. Your  urinalysis is consistent with urinary tract infection; however, the signs of another serious problem may take more time to appear. Therefore, it is important for you to watch for any new symptoms or worsening of your condition as we discussed in the clinic, including appendicitis.    HOME CARE:  1. Rest until your next exam. No strenuous activities.  2. Eat a diet low in fiber (called a low-residue diet). Foods allowed include refined breads, white rice, fruit and vegetable juices without pulp, tender meats. These foods will pass more easily through the intestine.  3. Avoid whole-grain foods, whole fruits and vegetables, meats, seeds and nuts, fried or fatty foods, dairy, alcohol, and spicy foods until your symptoms go away.  4. Take all medications as directed.   5. Rest and keep yourself/patient well hydrated.        FOLLOW UP with your primary care physician in 2-3 days or as directed at this visit.      You should reports immediately to your nearest Emergency Department for further evaluation if your condition worsens at any time or any of the following occur:  Pain gets worse or moves to the right lower abdomen  New or worsening vomiting or diarrhea  Swelling of the abdomen  Unable to pass stool for more than three days  New fever over 100.0º F (37.8ºC), or rising fever  Blood in vomit or bowel movements (dark red or black color)  Jaundice (yellow color of eyes and skin)  Weakness, dizziness, or  fainting  Chest, arm, back, neck or jaw pain  Unexpected vaginal bleeding or missed period    **You must understand that you have received Urgent Care treatment only and that you may be released before all of your medical problems are known or treated. You, the patient, are responsible to arrange for follow-up care as instructed.

## 2024-05-26 NOTE — PROGRESS NOTES
"Subjective:      Patient ID: Abbey Vargas is a 73 y.o. female.    Vitals:  height is 5' 3" (1.6 m) and weight is 74.2 kg (163 lb 11.1 oz). Her oral temperature is 98.1 °F (36.7 °C). Her blood pressure is 101/68 and her pulse is 76. Her respiration is 17 and oxygen saturation is 97%.     Chief Complaint: Abdominal Pain    Started with RLQ pain yesterday. States pain is not relieved by anything and made worse when breathing deep. States area is sensitive to touch. She has not tried any otc treatment.    Abdominal Pain  This is a new problem. The current episode started yesterday. The onset quality is sudden. The problem occurs constantly. The most recent episode lasted 2 days. The problem has been gradually worsening. The pain is located in the RLQ. The pain is at a severity of 8/10. The pain is severe. The quality of the pain is aching. The abdominal pain radiates to the RLQ. Pertinent negatives include no anorexia, arthralgias, belching, constipation, diarrhea, dysuria, fever, flatus, frequency, headaches, hematochezia, hematuria, melena, myalgias, nausea, vomiting or weight loss. The pain is aggravated by being still, certain positions, deep breathing and movement. The pain is relieved by Nothing. She has tried nothing for the symptoms. The treatment provided no relief.       Constitution: Negative for fever.   Gastrointestinal:  Positive for abdominal pain. Negative for nausea, vomiting, constipation, diarrhea and bright red blood in stool.   Genitourinary:  Negative for dysuria, frequency and hematuria.   Musculoskeletal:  Negative for joint pain and muscle ache.   Neurological:  Negative for headaches.      Objective:     Physical Exam   Constitutional:  Non-toxic appearance. No distress.   HENT:   Mouth/Throat: Mucous membranes are moist.   Eyes: No scleral icterus.   Cardiovascular: Normal rate and regular rhythm.   Pulmonary/Chest: Effort normal and breath sounds normal.   Abdominal: She exhibits no " distension. Soft. There is abdominal tenderness in the right lower quadrant. There is no rebound, no guarding, no left CVA tenderness and no right CVA tenderness.     Skin: Skin is not diaphoretic.   Nursing note and vitals reviewed.chaperone present         Assessment:     1. Abdominal pain, RLQ    2. Urinary tract infection with hematuria, site unspecified      Results for orders placed or performed in visit on 05/26/24   POCT Urinalysis, Dipstick, Automated, W/O Scope   Result Value Ref Range    POC Blood, Urine Positive (A) Negative    POC Bilirubin, Urine Negative Negative    POC Urobilinogen, Urine 0.2 0.1 - 1.1    POC Ketones, Urine Negative Negative    POC Protein, Urine Negative Negative    POC Nitrates, Urine Positive (A) Negative    POC Glucose, Urine Negative Negative    pH, UA 6.0 5 - 8    POC Specific Gravity, Urine 1.020 1.003 - 1.029    POC Leukocytes, Urine Positive (A) Negative      Plan:       Abdominal pain, RLQ  -     POCT Urinalysis, Dipstick, Automated, W/O Scope  -     Urine culture    Urinary tract infection with hematuria, site unspecified  -     cephALEXin (KEFLEX) 500 MG capsule; Take 1 capsule (500 mg total) by mouth every 12 (twelve) hours. for 7 days  Dispense: 14 capsule; Refill: 0     Patient agreed to go to emergency department Immediately if new or worsening symptoms develop and/or symptoms have not improved in the next 24-48 hours.      Medical Decision Making:   Clinical Tests:   Lab Tests: Ordered and Reviewed       <> Summary of Lab: See above  Urgent Care Management:  Pt presenting with acute onset RLQ pain. NAD, ambulating without difficult, speaking in complete sentences, able to get on and off exam table. Pt afebrile. Mild tenderness to palpate right mid and lower quadrant. UA obtained and consistent with UTI.However, I discussed with pt that her abdominal pain could also be early signs of other serious diagnosis including appendicitis and very important to watch for any  new symptoms worsening condition and seek medical attention immediately. F/U in 48 hours if symptoms have not improved. Option for ER referral also discussed.

## 2024-06-21 PROBLEM — K80.20 CALCULUS OF GALLBLADDER WITHOUT CHOLECYSTITIS WITHOUT OBSTRUCTION: Status: ACTIVE | Noted: 2024-06-21

## 2024-07-03 PROBLEM — K80.20 CALCULUS OF GALLBLADDER WITHOUT CHOLECYSTITIS WITHOUT OBSTRUCTION: Status: RESOLVED | Noted: 2024-06-21 | Resolved: 2024-07-03

## 2024-08-02 PROBLEM — I47.29 NSVT (NONSUSTAINED VENTRICULAR TACHYCARDIA): Status: ACTIVE | Noted: 2024-08-02

## 2024-08-03 PROBLEM — R55 NEAR SYNCOPE: Status: ACTIVE | Noted: 2024-08-03

## 2024-09-04 ENCOUNTER — OFFICE VISIT (OUTPATIENT)
Dept: URGENT CARE | Facility: CLINIC | Age: 74
End: 2024-09-04
Payer: MEDICARE

## 2024-09-04 VITALS
OXYGEN SATURATION: 98 % | DIASTOLIC BLOOD PRESSURE: 90 MMHG | SYSTOLIC BLOOD PRESSURE: 120 MMHG | HEIGHT: 63 IN | HEART RATE: 48 BPM | BODY MASS INDEX: 29.41 KG/M2 | TEMPERATURE: 99 F | WEIGHT: 166 LBS | RESPIRATION RATE: 18 BRPM

## 2024-09-04 DIAGNOSIS — M25.562 POSTERIOR LEFT KNEE PAIN: Primary | ICD-10-CM

## 2024-09-04 PROCEDURE — 99215 OFFICE O/P EST HI 40 MIN: CPT | Mod: S$GLB,,, | Performed by: PHYSICIAN ASSISTANT

## 2024-09-04 NOTE — PATIENT INSTRUCTIONS
Go to ER for further evaluation and treatment of your left posterior knee pain  with concerns for possible DVT

## 2024-09-04 NOTE — PROGRESS NOTES
"Subjective:      Patient ID: Abbey Vargas is a 74 y.o. female.    Vitals:  height is 5' 3" (1.6 m) and weight is 75.3 kg (166 lb). Her oral temperature is 98.7 °F (37.1 °C). Her blood pressure is 120/90 (abnormal) and her pulse is 48 (abnormal). Her respiration is 18 and oxygen saturation is 98%.     Chief Complaint: Left leg pain    Patient states her left leg behind her knee started last night. She just had an ultra sound done.    Other  This is a new problem. The current episode started yesterday. The problem occurs constantly. The problem has been unchanged. Pertinent negatives include no congestion, headaches or sore throat. The symptoms are aggravated by walking. She has tried nothing for the symptoms. The treatment provided no relief.       HENT:  Negative for congestion and sore throat.    Neurological:  Negative for headaches.      Objective:     Physical Exam   Constitutional: She is oriented to person, place, and time. She appears well-developed. She is cooperative.  Non-toxic appearance. She does not appear ill. No distress.   HENT:   Head: Normocephalic and atraumatic.   Ears:   Right Ear: Hearing and external ear normal.   Left Ear: Hearing and external ear normal.   Nose: Nose normal.   Eyes: Conjunctivae and lids are normal. No scleral icterus.   Neck: Phonation normal. Neck supple.   Cardiovascular: Normal rate, regular rhythm, normal heart sounds and normal pulses.   No murmur heard.Exam reveals no gallop and no friction rub.   Pulmonary/Chest: Effort normal and breath sounds normal. No stridor. No respiratory distress. She has no wheezes. She has no rhonchi. She has no rales.   Abdominal: Normal appearance.   Musculoskeletal:        Legs:    Neurological: She is alert and oriented to person, place, and time. Coordination normal.   Skin: Skin is intact, not diaphoretic and not pale.   Psychiatric: Her speech is normal and behavior is normal. Judgment and thought content normal.   Nursing " note and vitals reviewed.      Assessment:     1. Posterior left knee pain        Plan:       Posterior left knee pain  -     Refer to Emergency Dept.      Posterior knee pain with questionable mild swelling of the left lower leg. Discussed trauma vs baker's cyst vs infection vs DVT. Denies trauma or injury. Recommend DVT evaluation which cannot be obtained from UC. Patient's daughter states that she will bring her to ER for further evaluation and treatment.    Medical Decision Making:   History:   I obtained history from: someone other than patient.       <> Summary of History: daughter

## 2024-09-23 PROBLEM — R22.31 MASS OF RIGHT AXILLA: Status: ACTIVE | Noted: 2024-09-23

## 2024-11-08 ENCOUNTER — OFFICE VISIT (OUTPATIENT)
Dept: URGENT CARE | Facility: CLINIC | Age: 74
End: 2024-11-08
Payer: MEDICARE

## 2024-11-08 VITALS
RESPIRATION RATE: 17 BRPM | WEIGHT: 170 LBS | SYSTOLIC BLOOD PRESSURE: 135 MMHG | HEART RATE: 48 BPM | OXYGEN SATURATION: 96 % | TEMPERATURE: 98 F | DIASTOLIC BLOOD PRESSURE: 69 MMHG | HEIGHT: 63 IN | BODY MASS INDEX: 30.12 KG/M2

## 2024-11-08 DIAGNOSIS — M25.552 LEFT HIP PAIN: Primary | ICD-10-CM

## 2024-11-08 RX ORDER — DICLOFENAC SODIUM 10 MG/G
2 GEL TOPICAL 2 TIMES DAILY
Qty: 100 G | Refills: 0 | Status: SHIPPED | OUTPATIENT
Start: 2024-11-08

## 2024-11-08 NOTE — PROGRESS NOTES
"Subjective:      Patient ID: Abbey Vargas is a 74 y.o. female.    Vitals:  height is 5' 3" (1.6 m) and weight is 77.1 kg (170 lb). Her oral temperature is 98 °F (36.7 °C). Her blood pressure is 135/69 and her pulse is 48 (abnormal). Her respiration is 17 and oxygen saturation is 96%.     Chief Complaint: Hip Pain    Denies trauma or injury. Reports pain in left hip x 2 months. No current treatment. Reports no numbness, tingling, loss of bowel or bladder function or saddle paresthesias. States that she has appointment scheduled with ortho la next week    Hip Pain   Incident onset: 2 months. The incident occurred at home. There was no injury mechanism. The pain is present in the left hip. Quality: sharp but also a constant pain. The pain is at a severity of 8/10. The pain is moderate. The pain has been Constant since onset. Pertinent negatives include no numbness or tingling. She reports no foreign bodies present. The symptoms are aggravated by weight bearing. She has tried nothing for the symptoms.       Musculoskeletal:  Positive for pain. Negative for trauma.        Hip pain   Neurological:  Negative for numbness and tingling.      Objective:     Physical Exam   Constitutional: She is oriented to person, place, and time. She appears well-developed. She is cooperative.  Non-toxic appearance. She does not appear ill. No distress.   HENT:   Head: Normocephalic and atraumatic.   Ears:   Right Ear: Hearing normal.   Left Ear: Hearing normal.   Eyes: Conjunctivae and lids are normal. No scleral icterus.   Neck: Phonation normal. Neck supple.   Cardiovascular: Normal rate and normal pulses.   Pulmonary/Chest: Effort normal. No respiratory distress.   Abdominal: Normal appearance.   Musculoskeletal:        Legs:       Comments: No midline TTP or step offs to cervical, thoracic or lumbar spine. No paraspinal muscle TTP. FROM of spine without discomfort or pain. No signs of trauma or injury.   Full range of motion " bilateral upper and lower extremities. Strength 5/5.  Intact distal pulses with no sensory deficits.  Capillary refill less than 3 sec.  No signs of trauma or injury. No ecchymosis, edema, erythema, abrasions or lacerations.     Neurological: She is alert and oriented to person, place, and time. Coordination normal.   Skin: Skin is intact, not diaphoretic and not pale.   Psychiatric: Her speech is normal and behavior is normal. Judgment and thought content normal.   Nursing note and vitals reviewed.      Assessment:     1. Left hip pain        Plan:       Left hip pain  -     X-Ray Hip 2 or 3 views Left with Pelvis when performed; Future; Expected date: 11/08/2024  -     diclofenac sodium (VOLTAREN) 1 % Gel; Apply 2 g topically 2 (two) times daily.  Dispense: 100 g; Refill: 0      Keep scheduled follow up with ortho la. Discussed with patient the importance of f/u with their primary care provider. Urged to go to the ER for any worsening signs or symptoms.       Medical Decision Making:   Independently Interpreted Test(s):   I have ordered and independently interpreted X-rays - see summary below.       <> Summary of X-Ray Reading(s): Left hip- arthritic changes noted. No acute fracture or dislocation

## 2024-12-05 PROBLEM — N39.0 UTI (URINARY TRACT INFECTION): Status: ACTIVE | Noted: 2024-12-05

## 2024-12-05 PROBLEM — K56.41 FECAL IMPACTION: Status: ACTIVE | Noted: 2024-12-05

## 2024-12-27 ENCOUNTER — OFFICE VISIT (OUTPATIENT)
Dept: URGENT CARE | Facility: CLINIC | Age: 74
End: 2024-12-27
Payer: MEDICARE

## 2024-12-27 VITALS
RESPIRATION RATE: 16 BRPM | HEART RATE: 45 BPM | BODY MASS INDEX: 29.23 KG/M2 | HEIGHT: 63 IN | SYSTOLIC BLOOD PRESSURE: 131 MMHG | OXYGEN SATURATION: 97 % | TEMPERATURE: 98 F | WEIGHT: 165 LBS | DIASTOLIC BLOOD PRESSURE: 74 MMHG

## 2024-12-27 DIAGNOSIS — M25.561 ACUTE PAIN OF RIGHT KNEE: Primary | ICD-10-CM

## 2024-12-27 DIAGNOSIS — R00.1 BRADYCARDIA: ICD-10-CM

## 2024-12-27 RX ORDER — IBUPROFEN 600 MG/1
600 TABLET ORAL EVERY 6 HOURS PRN
Qty: 28 TABLET | Refills: 0 | Status: SHIPPED | OUTPATIENT
Start: 2024-12-27 | End: 2025-01-03

## 2024-12-27 NOTE — PATIENT INSTRUCTIONS
1.  Take all medications as directed. See attached handout for more information regarding your condition.   2.  Rest and keep yourself/patient well hydrated.  3.  You can alternate Tylenol and Motrin every 4-6 hours for fever above 100.4F and/or pain.   4. You should schedule a follow-up appointment with your Primary Care Provider for recheck in 2-3 days or as directed at this visit.   5.  If your condition fails to improve in a timely manner, you should receive another evaluation by your Primary Care Provider to discuss your concerns or return to urgent care for a recheck.  If your condition worsens at any time, you should report immediately to your nearest Emergency Department for further evaluation. **You must understand that you have received Urgent Care treatment only and that you may be released before all of your medical problems are known or treated. You, the patient, are responsible to arrange for follow-up care as instructed.

## 2024-12-27 NOTE — PROGRESS NOTES
"Subjective:      Patient ID: Abbey Vargas is a 74 y.o. female.    Vitals:  height is 5' 3" (1.6 m) and weight is 74.8 kg (165 lb). Her oral temperature is 98.4 °F (36.9 °C). Her blood pressure is 131/74 and her pulse is 45 (abnormal). Her respiration is 16 and oxygen saturation is 97%.     Chief Complaint: Knee Pain    75 yo F here with right knee pain x3 weeks. She reports having a R knee replacement in 2018 and has been doing well since then. Denies fever.  Pain occurs when going from sitting to standing.     Knee Pain   The incident occurred more than 1 week ago. The incident occurred at home. There was no injury mechanism. The pain is present in the right knee. Quality: pressure. The pain is at a severity of 5/10. The pain is moderate. The pain has been Constant since onset. Pertinent negatives include no inability to bear weight, numbness or tingling. The symptoms are aggravated by movement and weight bearing. She has tried nothing for the symptoms. The treatment provided no relief.       Constitution: Negative for fever.   Skin:  Negative for erythema.   Neurological:  Negative for numbness.      Objective:     Physical Exam   Constitutional: She is oriented to person, place, and time.  Non-toxic appearance. She does not appear ill. No distress. normal  HENT:   Head: Normocephalic and atraumatic.   Ears:   Right Ear: External ear normal.   Left Ear: External ear normal.   Nose: Nose normal.   Eyes: Conjunctivae are normal.   Cardiovascular: Bradycardia present.   Pulmonary/Chest: Effort normal.   Abdominal: Normal appearance.   Musculoskeletal:         General: Swelling (R knee) present. No tenderness or signs of injury.      Right lower leg: No edema.      Comments: No warmth, erythema or tenderness to palpation to R knee   Neurological: She is alert and oriented to person, place, and time.   Skin: Skin is warm, dry and not diaphoretic. Capillary refill takes less than 2 seconds. No bruising and No " erythema   Psychiatric: Her behavior is normal.   Nursing note and vitals reviewed.      Assessment:     1. Acute pain of right knee    2. Bradycardia        Plan:       Acute pain of right knee  -     ibuprofen (ADVIL,MOTRIN) 600 MG tablet; Take 1 tablet (600 mg total) by mouth every 6 (six) hours as needed for Pain.  Dispense: 28 tablet; Refill: 0    Bradycardia      Patient Instructions   1.  Take all medications as directed. See attached handout for more information regarding your condition.   2.  Rest and keep yourself/patient well hydrated.  3.  You can alternate Tylenol and Motrin every 4-6 hours for fever above 100.4F and/or pain.   4. You should schedule a follow-up appointment with your Primary Care Provider for recheck in 2-3 days or as directed at this visit.   5.  If your condition fails to improve in a timely manner, you should receive another evaluation by your Primary Care Provider to discuss your concerns or return to urgent care for a recheck.  If your condition worsens at any time, you should report immediately to your nearest Emergency Department for further evaluation. **You must understand that you have received Urgent Care treatment only and that you may be released before all of your medical problems are known or treated. You, the patient, are responsible to arrange for follow-up care as instructed.           Medical Decision Making:   Urgent Care Management:  Patient reports her cardiologist is aware of her bradycardia and prescribed the Coreg after admission to the hospital for an evaluation of the bradycardia in August 2024.  Patient denies symptoms of dizziness, confusion, syncope, fatigue.

## 2025-04-22 ENCOUNTER — PATIENT OUTREACH (OUTPATIENT)
Dept: ADMINISTRATIVE | Facility: HOSPITAL | Age: 75
End: 2025-04-22
Payer: MEDICARE

## (undated) DEVICE — Device

## (undated) DEVICE — SUCTION SURGICAL FRAZR

## (undated) DEVICE — SOL IRR NACL .9% 3000ML

## (undated) DEVICE — BANDAGE ESMARK 6X12

## (undated) DEVICE — PAD CUSTOM COTTON 2 X 2

## (undated) DEVICE — BIT DRILL PIN TROCAR 3.2MM
Type: IMPLANTABLE DEVICE | Site: KNEE | Status: NON-FUNCTIONAL
Removed: 2018-02-07

## (undated) DEVICE — SEE MEDLINE ITEM 152529

## (undated) DEVICE — BLADE SAG 13.0 X1.27X90

## (undated) DEVICE — PAD COLD THERAPY KNEE WRAP ON

## (undated) DEVICE — DRAPE STERI 32 X 50

## (undated) DEVICE — SEE MEDLINE ITEM 152487

## (undated) DEVICE — KIT TOTAL KNEE TKOFG

## (undated) DEVICE — ELECTRODE REM PLYHSV RETURN 9

## (undated) DEVICE — BRUSH SURGICAL SCRUB STERILE

## (undated) DEVICE — DRESSING AQUACEL AG ADV 3.5X12

## (undated) DEVICE — NDL 18GA X1 1/2 REG BEVEL

## (undated) DEVICE — SEE MEDLINE ITEM 157131

## (undated) DEVICE — BLADE OTOLOGY BEAVER 5X84MM

## (undated) DEVICE — SUT VICRYL BR 1 GEN 27 CT-1

## (undated) DEVICE — SEE MEDLINE ITEM 146298

## (undated) DEVICE — SYR ONLY LUER LOCK 20CC

## (undated) DEVICE — SEE MEDLINE ITEM 157117

## (undated) DEVICE — BOWL CEMENT

## (undated) DEVICE — SUT VICRYL 3-0 27 SH

## (undated) DEVICE — PUMP COLD THERAPY

## (undated) DEVICE — TAPE SURG DURAPORE 2 X10YD

## (undated) DEVICE — SEE MEDLINE ITEM 154981

## (undated) DEVICE — MASK FLYTE HOOD PEEL AWAY

## (undated) DEVICE — BLADE RECIP RIBBED

## (undated) DEVICE — ADHESIVE DERMABOND ADVANCED

## (undated) DEVICE — SUT MONOCRYL 3-0 SH U/D

## (undated) DEVICE — HOOD T-5 TEAR AWAY STERILE

## (undated) DEVICE — SYR 30CC LUER LOCK

## (undated) DEVICE — KIT IRR SUCTION HND PIECE

## (undated) DEVICE — PAD CAST SPECIALIST STRL 6

## (undated) DEVICE — KIT EAR EAOFE

## (undated) DEVICE — PROBE OTOPROBE LONG ANGLE

## (undated) DEVICE — SEE MEDLINE ITEM 152622

## (undated) DEVICE — SCREW HEX HEAD 3.5X38MM
Type: IMPLANTABLE DEVICE | Site: KNEE | Status: NON-FUNCTIONAL
Removed: 2018-02-07

## (undated) DEVICE — BLADE SURG CARBON STEEL #10

## (undated) DEVICE — SCREW HEX HEAD
Type: IMPLANTABLE DEVICE | Site: KNEE | Status: NON-FUNCTIONAL
Removed: 2018-02-07

## (undated) DEVICE — SUT CTD VICRYL 0 UND BR CPX

## (undated) DEVICE — BLADE SAG 18.0X1.27X100

## (undated) DEVICE — TOURNIQUET SB QC DP 34X4IN

## (undated) DEVICE — DRAPE STERI INSTRUMENT 1018

## (undated) DEVICE — SET CEMENT (SCULP)

## (undated) DEVICE — SEE MEDLINE ITEM 157128

## (undated) DEVICE — BANDAGE ACE ELASTIC 6"